# Patient Record
Sex: MALE | Race: WHITE | NOT HISPANIC OR LATINO | Employment: FULL TIME | URBAN - NONMETROPOLITAN AREA
[De-identification: names, ages, dates, MRNs, and addresses within clinical notes are randomized per-mention and may not be internally consistent; named-entity substitution may affect disease eponyms.]

---

## 2017-03-29 ENCOUNTER — RADIANT APPOINTMENT (OUTPATIENT)
Dept: GENERAL RADIOLOGY | Facility: OTHER | Age: 57
End: 2017-03-29
Attending: INTERNAL MEDICINE
Payer: COMMERCIAL

## 2017-03-29 ENCOUNTER — OFFICE VISIT (OUTPATIENT)
Dept: FAMILY MEDICINE | Facility: OTHER | Age: 57
End: 2017-03-29
Payer: COMMERCIAL

## 2017-03-29 VITALS
HEIGHT: 66 IN | WEIGHT: 150.9 LBS | BODY MASS INDEX: 24.25 KG/M2 | TEMPERATURE: 98.6 F | HEART RATE: 122 BPM | OXYGEN SATURATION: 98 % | DIASTOLIC BLOOD PRESSURE: 82 MMHG | SYSTOLIC BLOOD PRESSURE: 138 MMHG

## 2017-03-29 DIAGNOSIS — Z71.89 ADVANCED DIRECTIVES, COUNSELING/DISCUSSION: ICD-10-CM

## 2017-03-29 DIAGNOSIS — Z72.0 TOBACCO ABUSE: ICD-10-CM

## 2017-03-29 DIAGNOSIS — Z00.00 ROUTINE GENERAL MEDICAL EXAMINATION AT A HEALTH CARE FACILITY: Primary | ICD-10-CM

## 2017-03-29 DIAGNOSIS — R63.4 LOSS OF WEIGHT: ICD-10-CM

## 2017-03-29 DIAGNOSIS — Z12.11 SCREEN FOR COLON CANCER: ICD-10-CM

## 2017-03-29 DIAGNOSIS — E78.5 HYPERLIPIDEMIA LDL GOAL <130: ICD-10-CM

## 2017-03-29 DIAGNOSIS — Z12.5 SCREENING FOR PROSTATE CANCER: ICD-10-CM

## 2017-03-29 LAB
ALBUMIN SERPL-MCNC: 4.3 G/DL (ref 3.4–5)
ALBUMIN UR-MCNC: NEGATIVE MG/DL
ALP SERPL-CCNC: 53 U/L (ref 40–150)
ALT SERPL W P-5'-P-CCNC: 29 U/L (ref 0–70)
ANION GAP SERPL CALCULATED.3IONS-SCNC: 4 MMOL/L (ref 3–14)
APPEARANCE UR: CLEAR
AST SERPL W P-5'-P-CCNC: 18 U/L (ref 0–45)
BILIRUB SERPL-MCNC: 0.4 MG/DL (ref 0.2–1.3)
BILIRUB UR QL STRIP: NEGATIVE
BUN SERPL-MCNC: 26 MG/DL (ref 7–30)
CALCIUM SERPL-MCNC: 9.4 MG/DL (ref 8.5–10.1)
CHLORIDE SERPL-SCNC: 106 MMOL/L (ref 94–109)
CHOLEST SERPL-MCNC: 219 MG/DL
CO2 SERPL-SCNC: 32 MMOL/L (ref 20–32)
COLOR UR AUTO: YELLOW
CREAT SERPL-MCNC: 0.93 MG/DL (ref 0.66–1.25)
ERYTHROCYTE [DISTWIDTH] IN BLOOD BY AUTOMATED COUNT: 13 % (ref 10–15)
GFR SERPL CREATININE-BSD FRML MDRD: 84 ML/MIN/1.7M2
GLUCOSE SERPL-MCNC: 103 MG/DL (ref 70–99)
GLUCOSE UR STRIP-MCNC: NEGATIVE MG/DL
HCT VFR BLD AUTO: 42 % (ref 40–53)
HDLC SERPL-MCNC: 81 MG/DL
HGB BLD-MCNC: 14.5 G/DL (ref 13.3–17.7)
HGB UR QL STRIP: NEGATIVE
KETONES UR STRIP-MCNC: NEGATIVE MG/DL
LDLC SERPL CALC-MCNC: 116 MG/DL
LEUKOCYTE ESTERASE UR QL STRIP: NEGATIVE
MCH RBC QN AUTO: 32.2 PG (ref 26.5–33)
MCHC RBC AUTO-ENTMCNC: 34.5 G/DL (ref 31.5–36.5)
MCV RBC AUTO: 93 FL (ref 78–100)
NITRATE UR QL: NEGATIVE
NONHDLC SERPL-MCNC: 138 MG/DL
PH UR STRIP: 6 PH (ref 5–7)
PLATELET # BLD AUTO: 245 10E9/L (ref 150–450)
POTASSIUM SERPL-SCNC: 3.9 MMOL/L (ref 3.4–5.3)
PROT SERPL-MCNC: 7.2 G/DL (ref 6.8–8.8)
PSA SERPL-ACNC: 0.72 UG/L (ref 0–4)
RBC # BLD AUTO: 4.51 10E12/L (ref 4.4–5.9)
SODIUM SERPL-SCNC: 142 MMOL/L (ref 133–144)
SP GR UR STRIP: 1.02 (ref 1–1.03)
T4 FREE SERPL-MCNC: 0.74 NG/DL (ref 0.76–1.46)
TRIGL SERPL-MCNC: 110 MG/DL
TSH SERPL DL<=0.005 MIU/L-ACNC: 5.77 MU/L (ref 0.4–4)
URN SPEC COLLECT METH UR: NORMAL
UROBILINOGEN UR STRIP-ACNC: 0.2 EU/DL (ref 0.2–1)
WBC # BLD AUTO: 7.8 10E9/L (ref 4–11)

## 2017-03-29 PROCEDURE — 80061 LIPID PANEL: CPT | Performed by: INTERNAL MEDICINE

## 2017-03-29 PROCEDURE — G0103 PSA SCREENING: HCPCS | Performed by: INTERNAL MEDICINE

## 2017-03-29 PROCEDURE — 71020 XR CHEST 2 VW: CPT

## 2017-03-29 PROCEDURE — 84439 ASSAY OF FREE THYROXINE: CPT | Performed by: INTERNAL MEDICINE

## 2017-03-29 PROCEDURE — 99386 PREV VISIT NEW AGE 40-64: CPT | Performed by: INTERNAL MEDICINE

## 2017-03-29 PROCEDURE — 84443 ASSAY THYROID STIM HORMONE: CPT | Performed by: INTERNAL MEDICINE

## 2017-03-29 PROCEDURE — 36415 COLL VENOUS BLD VENIPUNCTURE: CPT | Performed by: INTERNAL MEDICINE

## 2017-03-29 PROCEDURE — 85027 COMPLETE CBC AUTOMATED: CPT | Performed by: INTERNAL MEDICINE

## 2017-03-29 PROCEDURE — 80053 COMPREHEN METABOLIC PANEL: CPT | Performed by: INTERNAL MEDICINE

## 2017-03-29 PROCEDURE — 81003 URINALYSIS AUTO W/O SCOPE: CPT | Performed by: INTERNAL MEDICINE

## 2017-03-29 ASSESSMENT — PAIN SCALES - GENERAL: PAINLEVEL: MODERATE PAIN (4)

## 2017-03-29 NOTE — MR AVS SNAPSHOT
After Visit Summary   3/29/2017    Melo Brewer    MRN: [id number]           Patient Information     Date Of Birth          1960        Visit Information        Provider Department      3/29/2017 2:40 PM Toi Wang DO [facility]        Today's Diagnoses     Routine general medical examination at a health care facility    -  1    Loss of weight        Hyperlipidemia LDL goal <130        Screen for colon cancer        Screening for prostate cancer        Tobacco abuse        Advanced directives, counseling/discussion          Care Instructions      Preventive Health Recommendations  Male Ages 50 - 64    Yearly exam:             See your health care provider every year in order to  o   Review health changes.   o   Discuss preventive care.    o   Review your medicines if your doctor has prescribed any.     Have a cholesterol test every 5 years, or more frequently if you are at risk for high cholesterol/heart disease.     Have a diabetes test (fasting glucose) every three years. If you are at risk for diabetes, you should have this test more often.     Have a colonoscopy at age 50, or have a yearly FIT test (stool test). These exams will check for colon cancer.      Talk with your health care provider about whether or not a prostate cancer screening test (PSA) is right for you.    You should be tested each year for STDs (sexually transmitted diseases), if you re at risk.     Shots: Get a flu shot each year. Get a tetanus shot every 10 years.     Nutrition:    Eat at least 5 servings of fruits and vegetables daily.     Eat whole-grain bread, whole-wheat pasta and brown rice instead of white grains and rice.     Talk to your provider about Calcium and Vitamin D.     Lifestyle    Exercise for at least 150 minutes a week (30 minutes a day, 5 days a week). This will help you control your weight and prevent disease.     Limit alcohol to one drink per day.     No smoking.      Wear sunscreen to prevent skin cancer.     See your dentist every six months for an exam and cleaning.     See your eye doctor every 1 to 2 years.          Follow-ups after your visit        Additional Services     GASTROENTEROLOGY ADULT REF PROCEDURE ONLY       Last Lab Result: Creatinine (mg/dL)       Date                     Value                 04/21/2009               0.80             ----------  Body mass index is 24.36 kg/(m^2).     Needed:  No  Language:  English    Patient will be contacted to schedule procedure.     Please be aware that coverage of these services is subject to the terms and limitations of your health insurance plan.  Call member services at your health plan with any benefit or coverage questions.  Any procedures must be performed at a [facility] facility OR coordinated by your clinic's referral office.    Please bring the following with you to your appointment:    (1) Any X-Rays, CTs or MRIs which have been performed.  Contact the facility where they were done to arrange for  prior to your scheduled appointment.    (2) List of current medications   (3) This referral request   (4) Any documents/labs given to you for this referral                  Who to contact     If you have questions or need follow up information about today's clinic visit or your schedule please contact [facility] directly at [phone].  Normal or non-critical lab and imaging results will be communicated to you by MyChart, letter or phone within 4 business days after the clinic has received the results. If you do not hear from us within 7 days, please contact the clinic through MyChart or phone. If you have a critical or abnormal lab result, we will notify you by phone as soon as possible.  Submit refill requests through [facility] or call your pharmacy and they will forward the refill request to us. Please allow 3 business days for your refill to be completed.          Additional  "Information About Your Visit        MyChart Information     [facility] lets you send messages to your doctor, view your test results, renew your prescriptions, schedule appointments and more. To sign up, go to [email or web address] . Click on \"Log in\" on the left side of the screen, which will take you to the Welcome page. Then click on \"Sign up Now\" on the right side of the page.     You will be asked to enter the access code listed below, as well as some personal information. Please follow the directions to create your username and password.     Your access code is: [email or web address]  Expires: 2017  3:43 PM     Your access code will  in 90 days. If you need help or a new code, please call your [place] clinic or [phone].        Care EveryWhere ID     This is your Care EveryWhere ID. This could be used by other organizations to access your [place] medical records  [id number]        Your Vitals Were     Pulse Temperature Height Pulse Oximetry BMI (Body Mass Index)       122 98.6  F (37  C) (Temporal) 5' 6\" (1.676 m) 98% 24.36 kg/m2        Blood Pressure from Last 3 Encounters:   17 138/82   14 136/78   14 138/80    Weight from Last 3 Encounters:   17 150 lb 14.4 oz (68.4 kg)   14 151 lb 9.6 oz (68.8 kg)   14 155 lb 12.8 oz (70.7 kg)              We Performed the Following     CBC with platelets     Comprehensive metabolic panel     GASTROENTEROLOGY ADULT REF PROCEDURE ONLY     LIPID REFLEX TO DIRECT LDL PANEL     PSA, screen     TSH with free T4 reflex     UA reflex to Microscopic        Primary Care Provider    None Specified       No primary provider on file.        Thank you!     Thank you for choosing [facility]  for your care. Our goal is always to provide you with excellent care. Hearing back from our patients is one way we can continue to improve our services. Please take a few minutes to complete the written survey that you may receive in the mail " after your visit with us. Thank you!             Your Updated Medication List - Protect others around you: Learn how to safely use, store and throw away your medicines at www.disposemymeds.org.      Notice  As of 3/29/2017  3:43 PM    You have not been prescribed any medications.

## 2017-03-29 NOTE — LETTER
[facility]  [address] [facility] [id number] [phone]             April 4, 2017    Melo Brewer  [address] [facility] [address]              Dear Melo,    The urine sample is normal.   Thyroid is very minimally decreased.   Cholesterol is well-controlled at 116.   The chemistry panel was normal.  Kidney and liver tests are normal   The blood cell count is normal.  There is no anemia.   I would simply repeat the thyroid tests in about four or five months.    Enclosed is a copy of the results.  It was a pleasure to see you at your last appointment.    If you have any questions or concerns, please call myself or my nurse at [phone].      Sincerely,      Toi Wang DO / care team

## 2017-03-29 NOTE — PROGRESS NOTES
SUBJECTIVE:     CC: Melo Brewer is an 56 year old male who presents for preventative health visit.     The patient does have a few concerns. He's recently had some weight loss which is required him changing to holes on his belt. He notes that his diet hasn't changed but he does not eat at regular times due to his off hours work shift. He has otherwise been feeling well.  He does have a history of tobacco use and has never had a chest x-ray. He notes he occasionally has a cough but has noted no hemoptysis.  He does have a family history of elevated cholesterol and has not had it checked in some time. Is concerned regarding his possible cardiac risk.  He has a small black spot on his lower left anterior tibial area which has been present for a few months. Is concerned regarding the possibility of melanoma. It is slightly irregular in shape and somewhat subcutaneous. No other lesions of concern are noted  He has some paresthesias in his thumb and index finger bilaterally. He does have a history of what appears to be a C6 cervical surgery. Is uncertain as to the details, this was 10 years ago. No significant thenar atrophy is noted.        Healthy Habits:    Do you get at least three servings of calcium containing foods daily (dairy, green leafy vegetables, etc.)? no, taking calcium and/or vitamin D supplement: no    Amount of exercise or daily activities, outside of work: none    Problems taking medications regularly No    Medication side effects: No    Have you had an eye exam in the past two years? yes    Do you see a dentist twice per year? yes  Do you have sleep apnea, excessive snoring or daytime drowsiness?no    Today's PHQ-2 Score:   PHQ-2 ( 1999 Pfizer) 3/29/2017 1/27/2014   Q1: Little interest or pleasure in doing things 0 0   Q2: Feeling down, depressed or hopeless 0 0   PHQ-2 Score 0 0       Abuse: Current or Past(Physical, Sexual or Emotional)- No  Do you feel safe in your environment - Yes    Social  History   Substance Use Topics     Smoking status: Current Every Day Smoker     Packs/day: 0.50     Years: 15.00     Types: Cigarettes     Smokeless tobacco: Never Used     Alcohol use Yes      Comment: 1-2 daily     The patient does not drink >3 drinks per day nor >7 drinks per week.    Last PSA: No results found for: PSA    No results for input(s): CHOL, HDL, LDL, TRIG, CHOLHDLRATIO, NHDL in the last 36947 hours.    Reviewed orders with patient. Reviewed health maintenance and updated orders accordingly - Yes    Reviewed and updated as needed this visit by clinical staff  Tobacco  Allergies  Med Hx  Surg Hx  Fam Hx  Soc Hx        Reviewed and updated as needed this visit by Provider            ROS:  C: NEGATIVE for fever, chills, change in weight  INTEGUMENTARY/SKIN: As noted above.  E: NEGATIVE for vision changes or irritation  ENT: NEGATIVE for ear, mouth and throat problems  R: NEGATIVE for significant  SOB. He does have an occasional cough.  CV: NEGATIVE for chest pain, palpitations or peripheral edema  GI: NEGATIVE for nausea, abdominal pain, heartburn, or change in bowel habits   male: negative for dysuria, hematuria, decreased urinary stream, erectile dysfunction, urethral discharge  M: NEGATIVE for significant arthralgias or myalgia  N: NEGATIVE for weakness, dizziness or paresthesias  E: NEGATIVE for temperature intolerance, skin/hair changes  P: NEGATIVE for changes in mood or affect    Problem list, Medication list, Allergies, and Medical/Social/Surgical histories reviewed in [facility] and updated as appropriate.  Labs reviewed in EPIC  BP Readings from Last 3 Encounters:   03/29/17 162/82   03/06/14 136/78   01/27/14 138/80    Wt Readings from Last 3 Encounters:   03/29/17 150 lb 14.4 oz (68.4 kg)   03/06/14 151 lb 9.6 oz (68.8 kg)   01/27/14 155 lb 12.8 oz (70.7 kg)                  OBJECTIVE:     /82 (BP Location: Right arm, Patient Position: Chair, Cuff Size: Adult Regular)  Pulse 122   "Temp 98.6  F (37  C) (Temporal)  Ht 5' 6\" (1.676 m)  Wt 150 lb 14.4 oz (68.4 kg)  SpO2 98%  BMI 24.36 kg/m2  EXAM:  GENERAL: healthy, alert and no distress  EYES: Eyes grossly normal to inspection, PERRL and conjunctivae and sclerae normal  HENT: ear canals and TM's normal, nose and mouth without ulcers or lesions  NECK: no adenopathy, no asymmetry, masses, or scars and thyroid normal to palpation  RESP: lungs clear to auscultation - no rales, rhonchi or wheezes  CV: regular rate and rhythm, normal S1 S2, no S3 or S4, no murmur, click or rub, no peripheral edema and peripheral pulses strong  ABDOMEN: soft, nontender, no hepatosplenomegaly, no masses and bowel sounds normal  MS: no gross musculoskeletal defects noted, no edema  SKIN: no suspicious rashes are noted. A few scattered nevi are noted. He does have a lesion on his lower left leg as described above. It measures approximately 5 mm in diameter, is mildly subcutaneous, and is irregular in shape and border.  NEURO: Normal strength and tone, mentation intact and speech normal  PSYCH: mentation appears normal, affect normal/bright    ASSESSMENT/PLAN:         ICD-10-CM    1. Routine general medical examination at a health care facility Z00.00 UA reflex to Microscopic     Comprehensive metabolic panel   2. Loss of weight R63.4 CBC with platelets     TSH with free T4 reflex   3. Hyperlipidemia LDL goal <130 E78.5 LIPID REFLEX TO DIRECT LDL PANEL   4. Screen for colon cancer Z12.11 GASTROENTEROLOGY ADULT REF PROCEDURE ONLY   5. Screening for prostate cancer Z12.5 PSA, screen   6. Tobacco abuse Z72.0 XR Chest 2 Views   7. Advanced directives, counseling/discussion Z71.89      Will check chest x-ray to rule out possible pulmonary lesion as he does have occasional cough.  We'll set up for colonoscopy  Lab work is ordered  Patient will follow up for excisional biopsy of his lesion on the left leg  Will discuss lab work at that time            COUNSELING:  Reviewed " "preventive health counseling, as reflected in patient instructions       Regular exercise       Healthy diet/nutrition       Vision screening       Hearing screening       Colon cancer screening       Prostate cancer screening    BP Screening:   Last 3 BP Readings:    BP Readings from Last 3 Encounters:   03/29/17 138/82   03/06/14 136/78   01/27/14 138/80       The following was recommended to the patient:  Re-screen BP within a year and recommended lifestyle modifications     reports that he has been smoking Cigarettes.  He has a 7.50 pack-year smoking history. He has never used smokeless tobacco.  Tobacco Cessation Action Plan: Information offered: Patient not interested at this time  Estimated body mass index is 24.36 kg/(m^2) as calculated from the following:    Height as of this encounter: 5' 6\" (1.676 m).    Weight as of this encounter: 150 lb 14.4 oz (68.4 kg).       Counseling Resources:  ATP IV Guidelines  Pooled Cohorts Equation Calculator  FRAX Risk Assessment  ICSI Preventive Guidelines  Dietary Guidelines for Americans, 2010  USDA's MyPlate  ASA Prophylaxis  Lung CA Screening    Toi Wang,   [facility]"

## 2017-03-29 NOTE — NURSING NOTE
"Chief Complaint   Patient presents with     Physical       Initial /82 (BP Location: Right arm, Patient Position: Chair, Cuff Size: Adult Regular)  Pulse 122  Temp 98.6  F (37  C) (Temporal)  Ht 5' 6\" (1.676 m)  Wt 150 lb 14.4 oz (68.4 kg)  SpO2 98%  BMI 24.36 kg/m2 Estimated body mass index is 24.36 kg/(m^2) as calculated from the following:    Height as of this encounter: 5' 6\" (1.676 m).    Weight as of this encounter: 150 lb 14.4 oz (68.4 kg).  Medication Reconciliation: complete   Laxmi THACKER      "

## 2017-03-29 NOTE — LETTER

## 2017-03-30 ENCOUNTER — TELEPHONE (OUTPATIENT)
Dept: INTERNAL MEDICINE | Facility: CLINIC | Age: 57
End: 2017-03-30

## 2017-03-30 NOTE — TELEPHONE ENCOUNTER
Left message for patient to return call to schedule colonoscopy or EGD. If Dulce or Sherin are unavailable, please transfer to the surgery center.     OK to schedule with ED or [facility]

## 2017-04-04 ENCOUNTER — TELEPHONE (OUTPATIENT)
Dept: INTERNAL MEDICINE | Facility: CLINIC | Age: 57
End: 2017-04-04

## 2017-04-04 NOTE — PROGRESS NOTES
Please contact the patient and notify him of the following:  The chest x-ray was normal.              Thank you.  DO LORENZO Aguilar

## 2017-04-04 NOTE — TELEPHONE ENCOUNTER
Melo returns call and message below is relayed.  Pt states understanding and had no other questions or concerns.

## 2017-04-04 NOTE — TELEPHONE ENCOUNTER
----- Message from Toi Wang DO sent at 4/3/2017 10:25 PM CDT -----    Please contact the patient and notify him of the following:  The chest x-ray was normal.              Thank you.  Toi Wang DO [facility]

## 2017-04-04 NOTE — PROGRESS NOTES
Please contact the patient and notify him of the following:  The urine sample is normal.  Thyroid is very minimally decreased.  Cholesterol is well-controlled at 116.  The chemistry panel was normal.  Kidney and liver tests are normal  The blood cell count is normal.  There is no anemia.  I would simply repeat the thyroid tests in about four or five months.          Thank you.  DO LORENZO Aguilar

## 2017-04-12 ENCOUNTER — OFFICE VISIT (OUTPATIENT)
Dept: FAMILY MEDICINE | Facility: OTHER | Age: 57
End: 2017-04-12
Payer: COMMERCIAL

## 2017-04-12 VITALS
HEART RATE: 78 BPM | WEIGHT: 148 LBS | TEMPERATURE: 98.5 F | SYSTOLIC BLOOD PRESSURE: 142 MMHG | BODY MASS INDEX: 23.89 KG/M2 | OXYGEN SATURATION: 99 % | DIASTOLIC BLOOD PRESSURE: 78 MMHG

## 2017-04-12 DIAGNOSIS — T14.8XXA WOUND INFECTION: ICD-10-CM

## 2017-04-12 DIAGNOSIS — L98.9 SKIN LESION OF LEFT LEG: Primary | ICD-10-CM

## 2017-04-12 DIAGNOSIS — L08.9 WOUND INFECTION: ICD-10-CM

## 2017-04-12 PROCEDURE — 11403 EXC TR-EXT B9+MARG 2.1-3CM: CPT | Performed by: INTERNAL MEDICINE

## 2017-04-12 PROCEDURE — 88342 IMHCHEM/IMCYTCHM 1ST ANTB: CPT | Performed by: INTERNAL MEDICINE

## 2017-04-12 PROCEDURE — 88341 IMHCHEM/IMCYTCHM EA ADD ANTB: CPT | Performed by: INTERNAL MEDICINE

## 2017-04-12 PROCEDURE — 88305 TISSUE EXAM BY PATHOLOGIST: CPT | Performed by: INTERNAL MEDICINE

## 2017-04-12 PROCEDURE — 88313 SPECIAL STAINS GROUP 2: CPT | Performed by: INTERNAL MEDICINE

## 2017-04-12 RX ORDER — AMOXICILLIN 250 MG/1
250 CAPSULE ORAL 3 TIMES DAILY
Qty: 15 CAPSULE | Refills: 0 | Status: SHIPPED | OUTPATIENT
Start: 2017-04-12 | End: 2017-05-15

## 2017-04-12 ASSESSMENT — PAIN SCALES - GENERAL: PAINLEVEL: NO PAIN (0)

## 2017-04-12 NOTE — NURSING NOTE
"Chief Complaint   Patient presents with     Light/dark Spots     on right lower leg       Initial /78 (BP Location: Right arm, Patient Position: Chair, Cuff Size: Adult Regular)  Pulse 78  Temp 98.5  F (36.9  C) (Temporal)  Wt 148 lb (67.1 kg)  SpO2 99%  BMI 23.89 kg/m2 Estimated body mass index is 23.89 kg/(m^2) as calculated from the following:    Height as of 3/29/17: 5' 6\" (1.676 m).    Weight as of this encounter: 148 lb (67.1 kg).  Medication Reconciliation: complete   Health Maintenance reviewed at today's visit patient asked to schedule/complete:   Colon Cancer:  Patient agrees to schedule   Laxmi THACKER        "

## 2017-04-12 NOTE — PROGRESS NOTES
Chief Complaint   Patient presents with     Light/dark Spots     on right lower leg       Procedure Note  Procedure: Excision of atypical skin lesion on the lower anterior left tibial area  Indication: Atypical skin lesion which is increasing in size over the last several months  Anesthesia: 1 cc of 1% lidocaine with epinephrine  Postprocedural diagnosis: Same  Procedure details:   The area sterilely prepped and draped using Betadine and sterile technique. Once adequate anesthesia was obtained, a elliptical excision of approximately 2 cm in length was made vertically including the lesion. This was not removed and the underlying black material which may have been old hematoma was removed as well and passed off to pathology. Hemostasis was excellent. The local area was then undermined using 3 simple sutures, it was closed with good approximation. The area was then cleaned and appropriately dressed. Patient had no discomfort and blood loss was 0.    Follow-up: He will be placed on amoxicillin 250 mg 3 times daily for 5 days for the prevention of secondary infection given the location on his lower leg.  Follow-up will also include review of the pathology results when they're available.    Patient will be contacted when results are available.  He will return to have the sutures removed in about 1 week  He is informed to be on the alert for possible signs of infection and they are explained        Kathleen

## 2017-04-12 NOTE — LETTER
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 The operative work here is \"benign\".  If you have any further questions or problems, please contact our office.          Sincerely,      Toi Wang, DO        "

## 2017-04-12 NOTE — MR AVS SNAPSHOT
"              After Visit Summary   2017    Melo Brewer    MRN: [id number]           Patient Information     Date Of Birth          1960        Visit Information        Provider Department      2017 2:00 PM Toi Wang DO [facility]        Today's Diagnoses     Skin lesion of left leg    -  1    Wound infection           Follow-ups after your visit        Who to contact     If you have questions or need follow up information about today's clinic visit or your schedule please contact [facility] directly at [phone].  Normal or non-critical lab and imaging results will be communicated to you by [employer], letter or phone within 4 business days after the clinic has received the results. If you do not hear from us within 7 days, please contact the clinic through [employer] or phone. If you have a critical or abnormal lab result, we will notify you by phone as soon as possible.  Submit refill requests through [facility] or call your pharmacy and they will forward the refill request to us. Please allow 3 business days for your refill to be completed.          Additional Information About Your Visit        MyChart Information     [facility] lets you send messages to your doctor, view your test results, renew your prescriptions, schedule appointments and more. To sign up, go to [email or web address] . Click on \"Log in\" on the left side of the screen, which will take you to the Welcome page. Then click on \"Sign up Now\" on the right side of the page.     You will be asked to enter the access code listed below, as well as some personal information. Please follow the directions to create your username and password.     Your access code is: [email or web address]  Expires: 2017  3:43 PM     Your access code will  in 90 days. If you need help or a new code, please call your [facility] or [phone].        Care EveryWhere ID     This is your Care EveryWhere ID. This " could be used by other organizations to access your [place] medical records  [id number]        Your Vitals Were     Pulse Temperature Pulse Oximetry BMI (Body Mass Index)          78 98.5  F (36.9  C) (Temporal) 99% 23.89 kg/m2         Blood Pressure from Last 3 Encounters:   04/12/17 142/78   03/29/17 138/82   03/06/14 136/78    Weight from Last 3 Encounters:   04/12/17 148 lb (67.1 kg)   03/29/17 150 lb 14.4 oz (68.4 kg)   03/06/14 151 lb 9.6 oz (68.8 kg)              We Performed the Following     EXC BENIGN SKIN LESION TRUNK/ARM/LEG 2.1-3.0 CM     Surgical pathology exam          Today's Medication Changes          These changes are accurate as of: 4/12/17 11:59 PM.  If you have any questions, ask your nurse or doctor.               Start taking these medicines.        Dose/Directions    amoxicillin 250 MG capsule   Commonly known as:  AMOXIL   Used for:  Wound infection   Started by:  Toi Wang DO        Dose:  250 mg   Take 1 capsule (250 mg) by mouth 3 times daily   Quantity:  15 capsule   Refills:  0            Where to get your medicines      These medications were sent to [id number] - [place], [place] - [address] [place] [address]  [address] [place] [id number]    Hours:  M-F 8:30-6:30; Sat 9-4; closed Sunday Phone:  [phone]     amoxicillin 250 MG capsule                Primary Care Provider    None Specified       [address] [place] [address]        Thank you!     Thank you for choosing [facility]  for your care. Our goal is always to provide you with excellent care. Hearing back from our patients is one way we can continue to improve our services. Please take a few minutes to complete the written survey that you may receive in the mail after your visit with us. Thank you!             Your Updated Medication List - Protect others around you: Learn how to safely use, store and throw away your medicines at www.disposemymeds.org.          This  list is accurate as of: 4/12/17 11:59 PM.  Always use your most recent med list.                   Brand Name Dispense Instructions for use    amoxicillin 250 MG capsule    AMOXIL    15 capsule    Take 1 capsule (250 mg) by mouth 3 times daily

## 2017-04-14 LAB — COPATH REPORT: NORMAL

## 2017-04-17 ENCOUNTER — TELEPHONE (OUTPATIENT)
Dept: INTERNAL MEDICINE | Facility: OTHER | Age: 57
End: 2017-04-17

## 2017-04-17 DIAGNOSIS — Z12.11 SPECIAL SCREENING FOR MALIGNANT NEOPLASMS, COLON: Primary | ICD-10-CM

## 2017-04-17 NOTE — LETTER

## 2017-04-18 NOTE — PROGRESS NOTES
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 The operative work here is \"benign\".    Thank you.  DO LORENZO Aguilar  "

## 2017-04-19 ENCOUNTER — TELEPHONE (OUTPATIENT)
Dept: INTERNAL MEDICINE | Facility: OTHER | Age: 57
End: 2017-04-19

## 2017-04-19 NOTE — TELEPHONE ENCOUNTER
Called to schedule colonoscopy, no answer, no voicemail option    Ok to schedule with [facility] or [facility]

## 2017-04-20 ENCOUNTER — TELEPHONE (OUTPATIENT)
Dept: FAMILY MEDICINE | Facility: CLINIC | Age: 57
End: 2017-04-20

## 2017-04-20 NOTE — TELEPHONE ENCOUNTER
Reason for Call:  Request for results:    Name of test or procedure: lab      Date of test of procedure: 4/12     Location of the test or procedure: [facility]    OK to leave the result message on voice mail or with a family member? YES    Phone number Patient can be reached at:  Other phone number:  [facility] [phone]    Additional comments: Requesting results     Call taken on 4/20/2017 at 8:11 AM by Ivy Washington

## 2017-04-20 NOTE — TELEPHONE ENCOUNTER
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 The operative work here is \"benign\".    Thank you.  Toi Wang DO [facility]  "

## 2017-05-19 ENCOUNTER — SURGERY (OUTPATIENT)
Age: 57
End: 2017-05-19

## 2017-05-19 ENCOUNTER — HOSPITAL ENCOUNTER (OUTPATIENT)
Facility: CLINIC | Age: 57
Discharge: HOME | End: 2017-05-19
Attending: INTERNAL MEDICINE | Admitting: INTERNAL MEDICINE
Payer: COMMERCIAL

## 2017-05-19 VITALS
RESPIRATION RATE: 16 BRPM | OXYGEN SATURATION: 96 % | SYSTOLIC BLOOD PRESSURE: 110 MMHG | DIASTOLIC BLOOD PRESSURE: 80 MMHG | TEMPERATURE: 98.2 F

## 2017-05-19 LAB — COLONOSCOPY: NORMAL

## 2017-05-19 PROCEDURE — 40000296 ZZH STATISTIC ENDO RECOVERY CLASS 1:2 FIRST HOUR: Performed by: INTERNAL MEDICINE

## 2017-05-19 PROCEDURE — 25000128 H RX IP 250 OP 636: Performed by: INTERNAL MEDICINE

## 2017-05-19 PROCEDURE — 25000125 ZZHC RX 250: Performed by: INTERNAL MEDICINE

## 2017-05-19 PROCEDURE — G0121 COLON CA SCRN NOT HI RSK IND: HCPCS | Performed by: INTERNAL MEDICINE

## 2017-05-19 PROCEDURE — 40000297 ZZH STATISTIC ENDO RECOVERY CLASS 1:2 EACH ADDL HOUR: Performed by: INTERNAL MEDICINE

## 2017-05-19 PROCEDURE — 45378 DIAGNOSTIC COLONOSCOPY: CPT | Performed by: INTERNAL MEDICINE

## 2017-05-19 RX ORDER — ONDANSETRON 4 MG/1
4 TABLET, ORALLY DISINTEGRATING ORAL EVERY 6 HOURS PRN
Status: DISCONTINUED | OUTPATIENT
Start: 2017-05-19 | End: 2017-05-19 | Stop reason: HOSPADM

## 2017-05-19 RX ORDER — LIDOCAINE 40 MG/G
CREAM TOPICAL
Status: DISCONTINUED | OUTPATIENT
Start: 2017-05-19 | End: 2017-05-19 | Stop reason: HOSPADM

## 2017-05-19 RX ORDER — NALOXONE HYDROCHLORIDE 0.4 MG/ML
.1-.4 INJECTION, SOLUTION INTRAMUSCULAR; INTRAVENOUS; SUBCUTANEOUS
Status: DISCONTINUED | OUTPATIENT
Start: 2017-05-19 | End: 2017-05-19 | Stop reason: HOSPADM

## 2017-05-19 RX ORDER — FLUMAZENIL 0.1 MG/ML
0.2 INJECTION, SOLUTION INTRAVENOUS
Status: DISCONTINUED | OUTPATIENT
Start: 2017-05-19 | End: 2017-05-19 | Stop reason: HOSPADM

## 2017-05-19 RX ORDER — FENTANYL CITRATE 50 UG/ML
INJECTION, SOLUTION INTRAMUSCULAR; INTRAVENOUS PRN
Status: DISCONTINUED | OUTPATIENT
Start: 2017-05-19 | End: 2017-05-19 | Stop reason: HOSPADM

## 2017-05-19 RX ORDER — ONDANSETRON 2 MG/ML
4 INJECTION INTRAMUSCULAR; INTRAVENOUS EVERY 6 HOURS PRN
Status: DISCONTINUED | OUTPATIENT
Start: 2017-05-19 | End: 2017-05-19 | Stop reason: HOSPADM

## 2017-05-19 RX ORDER — ONDANSETRON 2 MG/ML
4 INJECTION INTRAMUSCULAR; INTRAVENOUS
Status: DISCONTINUED | OUTPATIENT
Start: 2017-05-19 | End: 2017-05-19 | Stop reason: HOSPADM

## 2017-05-19 RX ADMIN — MIDAZOLAM HYDROCHLORIDE 2 MG: 1 INJECTION, SOLUTION INTRAMUSCULAR; INTRAVENOUS at 12:02

## 2017-05-19 RX ADMIN — MIDAZOLAM HYDROCHLORIDE 1 MG: 1 INJECTION, SOLUTION INTRAMUSCULAR; INTRAVENOUS at 11:57

## 2017-05-19 RX ADMIN — FENTANYL CITRATE 50 MCG: 50 INJECTION, SOLUTION INTRAMUSCULAR; INTRAVENOUS at 12:00

## 2017-05-19 RX ADMIN — MIDAZOLAM HYDROCHLORIDE 1 MG: 1 INJECTION, SOLUTION INTRAMUSCULAR; INTRAVENOUS at 11:56

## 2017-05-19 RX ADMIN — FENTANYL CITRATE 50 MCG: 50 INJECTION, SOLUTION INTRAMUSCULAR; INTRAVENOUS at 11:54

## 2017-05-19 RX ADMIN — MIDAZOLAM HYDROCHLORIDE 2 MG: 1 INJECTION, SOLUTION INTRAMUSCULAR; INTRAVENOUS at 12:06

## 2017-05-19 RX ADMIN — MIDAZOLAM HYDROCHLORIDE 2 MG: 1 INJECTION, SOLUTION INTRAMUSCULAR; INTRAVENOUS at 11:54

## 2017-05-19 RX ADMIN — LIDOCAINE HYDROCHLORIDE 1 ML: 10 INJECTION, SOLUTION EPIDURAL; INFILTRATION; INTRACAUDAL; PERINEURAL at 11:06

## 2017-05-19 RX ADMIN — MIDAZOLAM HYDROCHLORIDE 1 MG: 1 INJECTION, SOLUTION INTRAMUSCULAR; INTRAVENOUS at 12:04

## 2017-05-19 RX ADMIN — MIDAZOLAM HYDROCHLORIDE 1 MG: 1 INJECTION, SOLUTION INTRAMUSCULAR; INTRAVENOUS at 11:55

## 2017-05-19 NOTE — H&P
[facility] GI Pre-Procedure Physical Assessment    Melo Brewer MRN# [id number]   Age: 57 year old YOB: 1960      Date of Surgery: 5/19/2017  Location [facility]      Date of Exam 5/19/2017 Facility (Same day)       Home clinic: [facility]  Primary care provider: Toi Wang         Active problem list:   Patient Active Problem List   Diagnosis     CARDIOVASCULAR SCREENING; LDL GOAL LESS THAN 130     Advanced directives, counseling/discussion            Medications (include herbals and vitamins):   Any Plavix use in the last 7 days?  No     Current Facility-Administered Medications   Medication     lidocaine 1 % 1 mL     lidocaine (LMX4) kit     sodium chloride (PF) 0.9% PF flush 3 mL     sodium chloride (PF) 0.9% PF flush 3 mL     sodium chloride (PF) 0.9% PF flush 3 mL     ondansetron (ZOFRAN) injection 4 mg             Allergies:      Allergies   Allergen Reactions     Apple Anaphylaxis     Bees Anaphylaxis     Allergy to Latex?  No  Allergy to tape?    No          Social History:     Social History   Substance Use Topics     Smoking status: Current Every Day Smoker     Packs/day: 0.50     Years: 15.00     Types: Cigarettes     Smokeless tobacco: Never Used     Alcohol use Yes      Comment: 1-2 daily            Physical Exam:   All vitals have been reviewed  Blood pressure 140/85, temperature 98.2  F (36.8  C), temperature source Oral, resp. rate 16, SpO2 99 %.  Airway assessment:   Patient is able to open mouth wide  Patient is able to stick out tongue      Lungs:   No increased work of breathing, good air exchange, clear to auscultation bilaterally, no crackles or wheezing      Cardiovascular:   Normal apical impulse, regular rate and rhythm, normal S1 and S2, no S3 or S4, and no murmur noted           Lab / Radiology Results:   All laboratory data reviewed          Assessment:   Appropriately NPO  Chief complaint or anatomic assessment of  involved area: Screening         Plan:   Moderate (conscious) sedation     Patient's active problems diagnostically and therapeutically optimized for the planned procedure  Risks, benefits, alternatives to sedation and blood explained and consent obtained  Risks, benefits, alternatives to procedure explained and consent obtained  P1 (normal healthy patient)  Orders and progress notes are in the chart  Discharge from Phase 1 and / or Phase 2 recovery when patient meets criteria    I have reviewed the history and physical, lab finding(s), diagnostic data, medicaitons, and the plan for sedation.  I have determined this patient to be an appropriate candidate for the planned sedation / procedure and have reassessed the patient immediately prior to sedation / procedure.    I have personally and medically directed the administration of medications used.    HUSEYIN WHITE MD

## 2017-05-22 ENCOUNTER — TELEPHONE (OUTPATIENT)
Dept: INTERNAL MEDICINE | Facility: CLINIC | Age: 57
End: 2017-05-22

## 2017-05-22 NOTE — TELEPHONE ENCOUNTER
Left message with wife for the patient to call the clinic.  Tried patient on work phone but there was no answer.  Russell Tavarez MA

## 2017-05-22 NOTE — TELEPHONE ENCOUNTER
----- Message from Toi Wang DO sent at 5/21/2017 11:42 PM CDT -----    Please contact the patient and notify him of the following:  A few scattered diverticula I were noted in the bottom of the colon.  No infection was seen.  Recommend a high-fiber diet and the avoidance of insoluble particulate food (corn).    Thank you.  Toi Wang DO FACOI

## 2017-05-22 NOTE — PROGRESS NOTES
Please contact the patient and notify him of the following:  A few scattered diverticula I were noted in the bottom of the colon.  No infection was seen.  Recommend a high-fiber diet and the avoidance of insoluble particulate food (corn).    Thank you.  DO LORENZO Aguilar

## 2017-05-22 NOTE — LETTER
May 24, 2017      Melo Brewer  [address] [facility] [address]              Dear Melo rBewer,    A few scattered diverticula I were noted in the bottom of the colon.  No infection was seen.  Recommend a high-fiber diet and the avoidance of insoluble particulate food (corn).    Sincerely,    Dr. Wang / care team

## 2017-05-24 NOTE — TELEPHONE ENCOUNTER
Called pt and left a msg on his phone to call back. I also mailed out a letter to pt.   Marva Jimenez MA

## 2018-07-18 ENCOUNTER — ALLIED HEALTH/NURSE VISIT (OUTPATIENT)
Dept: FAMILY MEDICINE | Facility: OTHER | Age: 58
End: 2018-07-18
Payer: COMMERCIAL

## 2018-07-18 DIAGNOSIS — Z23 NEED FOR VACCINATION: Primary | ICD-10-CM

## 2018-07-18 PROCEDURE — 90471 IMMUNIZATION ADMIN: CPT

## 2018-07-18 PROCEDURE — 90715 TDAP VACCINE 7 YRS/> IM: CPT

## 2018-07-18 NOTE — MR AVS SNAPSHOT
After Visit Summary   7/18/2018    Melo Brewer    MRN: [id number]           Patient Information     Date Of Birth          1960        Visit Information        Provider Department      7/18/2018 4:00 PM TOMAS CANELA MA [facility]        Today's Diagnoses     Need for vaccination    -  1       Follow-ups after your visit        Who to contact     If you have questions or need follow up information about today's clinic visit or your schedule please contact [facility] directly at [phone].  Normal or non-critical lab and imaging results will be communicated to you by MyChart, letter or phone within 4 business days after the clinic has received the results. If you do not hear from us within 7 days, please contact the clinic through MyChart or phone. If you have a critical or abnormal lab result, we will notify you by phone as soon as possible.  Submit refill requests through Cli[employer] or call your pharmacy and they will forward the refill request to us. Please allow 3 business days for your refill to be completed.          Additional Information About Your Visit        Care EveryWhere ID     This is your Care EveryWhere ID. This could be used by other organizations to access your [place] medical records  [id number]         Blood Pressure from Last 3 Encounters:   05/19/17 110/80   04/12/17 142/78   03/29/17 138/82    Weight from Last 3 Encounters:   04/12/17 148 lb (67.1 kg)   03/29/17 150 lb 14.4 oz (68.4 kg)   03/06/14 151 lb 9.6 oz (68.8 kg)              We Performed the Following     1st  Administration  [41462]     TDAP VACCINE (ADACEL) [97043.002]        Primary Care Provider Office Phone # Fax #    Toi Gama Wang [phone]       [address] [place] [address]        Equal Access to Services     ARETHA GUY : Jennie Dang, abeba marina, qamargaritata kaalsoraida manzo, ross burgos.  So [facility] [phone].    ATENCIÓN: Si habla susy, tiene a kirk disposición servicios gratuitos de asistencia lingüística. Laura al [phone].    We comply with applicable federal civil rights laws and [place] laws. We do not discriminate on the basis of race, color, national origin, age, disability, sex, sexual orientation, or gender identity.            Thank you!     Thank you for choosing [facility]  for your care. Our goal is always to provide you with excellent care. Hearing back from our patients is one way we can continue to improve our services. Please take a few minutes to complete the written survey that you may receive in the mail after your visit with us. Thank you!             Your Updated Medication List - Protect others around you: Learn how to safely use, store and throw away your medicines at www.disposemymeds.org.      Notice  As of 7/18/2018  4:14 PM    You have not been prescribed any medications.

## 2018-07-18 NOTE — NURSING NOTE
Screening Questionnaire for Adult Immunization    Are you sick today?   No   Do you have allergies to medications, food, a vaccine component or latex?   No   Have you ever had a serious reaction after receiving a vaccination?   No   Do you have a long-term health problem with heart disease, lung disease, asthma, kidney disease, metabolic disease (e.g. diabetes), anemia, or other blood disorder?   No   Do you have cancer, leukemia, HIV/AIDS, or any other immune system problem?   No   In the past 3 months, have you taken medications that affect  your immune system, such as prednisone, other steroids, or anticancer drugs; drugs for the treatment of rheumatoid arthritis, Crohn s disease, or psoriasis; or have you had radiation treatments?   No   Have you had a seizure, or a brain or other nervous system problem?   No   During the past year, have you received a transfusion of blood or blood     products, or been given immune (gamma) globulin or antiviral drug?   No   For women: Are you pregnant or is there a chance you could become        pregnant during the next month?   No   Have you received any vaccinations in the past 4 weeks?   No     Immunization questionnaire answers were all negative.        Per orders of  , injection of  given by Liane Herrera. Patient instructed to remain in clinic for 15 minutes afterwards, and to report any adverse reaction to me immediately.       Screening performed by Liane Herrera on 7/18/2018 at 3:57 PM.      Prior to injection verified patient identity using patient's name and date of birth.  Patient instructed to wait 15-20 minutes after injection and to report any adverse reactions.

## 2018-08-22 ENCOUNTER — HOSPITAL ENCOUNTER (EMERGENCY)
Facility: CLINIC | Age: 58
Discharge: HOME | End: 2018-08-22
Attending: FAMILY MEDICINE | Admitting: FAMILY MEDICINE
Payer: COMMERCIAL

## 2018-08-22 VITALS
WEIGHT: 155 LBS | DIASTOLIC BLOOD PRESSURE: 86 MMHG | RESPIRATION RATE: 20 BRPM | BODY MASS INDEX: 25.02 KG/M2 | TEMPERATURE: 97.9 F | SYSTOLIC BLOOD PRESSURE: 146 MMHG | OXYGEN SATURATION: 99 %

## 2018-08-22 DIAGNOSIS — M62.830 BACK MUSCLE SPASM: ICD-10-CM

## 2018-08-22 DIAGNOSIS — S33.5XXA LUMBAR SPRAIN, INITIAL ENCOUNTER: ICD-10-CM

## 2018-08-22 PROCEDURE — 99284 EMERGENCY DEPT VISIT MOD MDM: CPT | Mod: 25 | Performed by: FAMILY MEDICINE

## 2018-08-22 PROCEDURE — 76942 ECHO GUIDE FOR BIOPSY: CPT | Mod: 26 | Performed by: FAMILY MEDICINE

## 2018-08-22 PROCEDURE — 20552 NJX 1/MLT TRIGGER POINT 1/2: CPT | Performed by: FAMILY MEDICINE

## 2018-08-22 PROCEDURE — 76942 ECHO GUIDE FOR BIOPSY: CPT | Performed by: FAMILY MEDICINE

## 2018-08-22 PROCEDURE — 20552 NJX 1/MLT TRIGGER POINT 1/2: CPT | Mod: Z6 | Performed by: FAMILY MEDICINE

## 2018-08-22 RX ORDER — IBUPROFEN 200 MG
800 TABLET ORAL EVERY 8 HOURS PRN
COMMUNITY
Start: 2018-08-22

## 2018-08-22 RX ORDER — BUPIVACAINE HYDROCHLORIDE 5 MG/ML
1 INJECTION, SOLUTION EPIDURAL; INTRACAUDAL ONCE
Status: DISCONTINUED | OUTPATIENT
Start: 2018-08-22 | End: 2018-08-22 | Stop reason: HOSPADM

## 2018-08-22 RX ORDER — CYCLOBENZAPRINE HCL 10 MG
10 TABLET ORAL 3 TIMES DAILY PRN
Qty: 20 TABLET | Refills: 0 | Status: SHIPPED | OUTPATIENT
Start: 2018-08-22 | End: 2019-01-29

## 2018-08-22 NOTE — ED AVS SNAPSHOT
" [facility] Emergency Department    [address] [place] [address]    Phone:  [phone]    Fax:  [phone]                                       Melo Brewer   MRN: [id number]    Department:  [facility] Emergency Department   Date of Visit:  8/22/2018           Patient Information     Date Of Birth          1960        Your diagnoses for this visit were:     Lumbar sprain, initial encounter     Back muscle spasm        You were seen by Jesus Manuel Hernandez MD.      Follow-up Information     Follow up with Toi Wang DO.    Specialty:  Internal Medicine    Why:  As needed    Contact information:    [address] [place] [address]  [phone]          Discharge Instructions       Ice 20 minutes per hour, (20 minutes on, 40 minutes off) at least 4 times a day.   You can alternate with heat if desired.  Physical therapy/massage/back exercises and core strengthening can also be helpful.  Tylenol/ibuprofen as needed for pain.  You may use the Flexeril as needed for spasm.  This can cause drowsiness or you may want to use it just at bedtime.  Some people find it helpful, others not so much.  Recheck in clinic with Dr. Wang if persistent problems.  Please return to the ED if you lose control of your bladder or bowels, fever over 100.4, weakness in the legs, or any concerns.  Gentle activity as tolerated today.  Avoid heavy lifting, bending or twisting until things settle down.  Please say \"hi\" to Liz for me!  It was nice visiting with both of you this morning.  I am glad you are feeling better and hope you continue to improve.    Thank you for choosing [facility]. We appreciate the opportunity to meet your urgent medical needs. Please let us know if we could have done anything to make your stay more satisfying.    After discharge, please closely monitor for any new or worsening symptoms. Return to the Emergency Department if you " develop any acute worsening signs or symptoms.    If you had lab work, cultures or imaging studies done during your stay, the final results may still be pending. We will call you if your plan of care needs to change. However, if you are not improving as expected, please follow up with your primary care provider or clinic.     Start any prescription medications that were prescribed to you and take them as directed.     Please see additional handouts that may be pertinent to your condition.          Discharge References/Attachments     MUSCLE SPASM (ENGLISH)    BACK SPRAIN/STRAIN (ENGLISH)      24 Hour Appointment Hotline       To make an appointment at any [facility], call [phone] ([phone]). If you don't have a family doctor or clinic, we will help you find one. [facility] clinics are conveniently located to serve the needs of you and your family.             Review of your medicines      START taking        Dose / Directions Last dose taken    cyclobenzaprine 10 MG tablet   Commonly known as:  FLEXERIL   Dose:  10 mg   Quantity:  20 tablet        Take 1 tablet (10 mg) by mouth 3 times daily as needed for muscle spasms   Refills:  0        ibuprofen 200 MG tablet   Commonly known as:  ADVIL/MOTRIN   Dose:  800 mg        Take 4 tablets (800 mg) by mouth every 8 hours as needed for pain (with food)   Refills:  0          Our records show that you are taking the medicines listed below. If these are incorrect, please call your family doctor or clinic.        Dose / Directions Last dose taken    TYLENOL PO   Dose:  1000 mg        Take 1,000 mg by mouth every 6 hours as needed for mild pain or fever   Refills:  0                Prescriptions were sent or printed at these locations (2 Prescriptions)                   [facility] - [address] [address]    Telephone:  [phone]   Fax:  [phone]   Hours:                   E-Prescribed (1 of 2)         cyclobenzaprine (FLEXERIL) 10 MG tablet                 Not Printed or Sent (1 of 2)         ibuprofen (ADVIL/MOTRIN) 200 MG tablet                Orders Needing Specimen Collection     None      Pending Results     No orders found from 8/20/2018 to 8/23/2018.            Pending Culture Results     No orders found from 8/20/2018 to 8/23/2018.            Pending Results Instructions     If you had any lab results that were not finalized at the time of your Discharge, you can call the ED Lab Result RN at [phone]. You will be contacted by this team for any positive Lab results or changes in treatment. The nurses are available 7 days a week from 10A to 6:30P.  You can leave a message 24 hours per day and they will return your call.        Thank you for choosing [facility]       Thank you for choosing [facility] for your care. Our goal is always to provide you with excellent care. Hearing back from our patients is one way we can continue to improve our services. Please take a few minutes to complete the written survey that you may receive in the mail after you visit with us. Thank you!        Care EveryWhere ID     This is your Care EveryWhere ID. This could be used by other organizations to access your [facility] medical records  [id number]        Equal Access to Services     ARETHA GUY AH: Jennie Dang, abeba marina, dina manzo, ross burgos. So [facility] [phone].    ATENCIÓN: Si habla español, tiene a kirk disposición servicios gratuitos de asistencia lingüística. Llame al [phone].    We comply with applicable federal civil rights laws and [place] laws. We do not discriminate on the basis of race, color, national origin, age, disability, sex, sexual orientation, or gender identity.            After Visit Summary       This is your record. Keep this with you and show to your community pharmacist(s) and doctor(s) at your  next visit.

## 2018-08-22 NOTE — ED AVS SNAPSHOT
[facility] Emergency Department    [address] [place] [address]    Phone:  [phone]    Fax:  [phone]                                       Melo Brewer   MRN: [id number]    Department:  [facility] Emergency Department   Date of Visit:  8/22/2018           After Visit Summary Signature Page     I have received my discharge instructions, and my questions have been answered. I have discussed any challenges I see with this plan with the nurse or doctor.    ..........................................................................................................................................  Patient/Patient Representative Signature      ..........................................................................................................................................  Patient Representative Print Name and Relationship to Patient    ..................................................               ................................................  Date                                            Time    ..........................................................................................................................................  Reviewed by Signature/Title    ...................................................              ..............................................  Date                                                            Time

## 2018-08-22 NOTE — LETTER
[facility]  Emergency Department  [address] [place] [address]    [phone] fax      8/22/2018      Melo Brewer  [address] [place] [address] [phone] (home) [phone] (work)        TO WHOM IT MAY CONCERN:      Melo was seen in the Emergency Department on 8/22/2018.    Please excuse from work.    He may return, without restrictions, when improved.      Sincerely,        Jesus Manuel Hernandez MD  Emergency Physician

## 2018-08-22 NOTE — ED TRIAGE NOTES
Reports lower back pain. States he had pain in his lower back last week for a day that went away and has started hurting again yesterday morning. Pt reports some lifting but denies any additional injuries.

## 2018-08-22 NOTE — ED PROVIDER NOTES
History     Chief Complaint   Patient presents with     Back Pain     HPI  Melo Brewer is a 58 year old male who presents to the ED this morning with lower back pain.  He had pain in his low back for 1 day just over a week ago on Monday.  Does not recall any specific injury and things settle down and he did fine the rest of the week.  Over the weekend he was helping to move a very heavy tear ricotta stove.  That was on Saturday.  He noticed some low back discomfort Tuesday morning and ended up missing work.  He has been taking some Tylenol which does seem to help somewhat.  His last dose was at 2 PM yesterday.  He was able to sleep but this morning with more severe pain and spasm.  There is no radiation.  There is no pain down the legs.  No loss of bladder or bowel control.    He has seen a chiropractor in the past.  Is also done physical therapy but did not find that particularly helpful.  Works at [employer] and they do have a physical therapist that comes in once a week to the workplace.  He denies any fevers.  No weight loss or weight gain.  Here with his wife.    Problem List:    Patient Active Problem List    Diagnosis Date Noted     Advanced directives, counseling/discussion 03/29/2017     Priority: Medium     declined       CARDIOVASCULAR SCREENING; LDL GOAL LESS THAN 130 10/31/2010     Priority: Medium        Past Medical History:    History reviewed. No pertinent past medical history.    Past Surgical History:    Past Surgical History:   Procedure Laterality Date     BACK SURGERY       COLONOSCOPY N/A 5/19/2017    Procedure: COLONOSCOPY;  Colonoscopy;  Surgeon: Robert Elmore MD;  Location: [facility]     HC EXCIS PRIMARY GANGLION WRIST  12/23/2004    Left wrist     HC INJ EPIDURAL LUMBAR/SACRAL W/WO CONTRAST  2009     HC REMOVAL OF TONSILS,<13 Y/O  1967    Tonsils <13y.o.     SURGICAL HISTORY OF -   4/24/2009    Left C6-C7 Microdiskectomy.       Family History:    Family History   Problem  Relation Age of Onset     Hypertension Father      on medication HTN     Hyperlipidemia Father      Hypertension Brother        Social History:  Marital Status:   [2]  Social History   Substance Use Topics     Smoking status: Current Every Day Smoker     Packs/day: 0.50     Years: 15.00     Types: Cigarettes     Smokeless tobacco: Never Used     Alcohol use Yes      Comment: 1-2 daily        Medications:      Acetaminophen (TYLENOL PO)   cyclobenzaprine (FLEXERIL) 10 MG tablet   ibuprofen (ADVIL/MOTRIN) 200 MG tablet         Review of Systems   All other systems reviewed and are negative.      Physical Exam   BP: (!) 134/106  Heart Rate: 71  Temp: 97.9  F (36.6  C)  Resp: 20  Weight: 70.3 kg (155 lb)  SpO2: 100 %      Physical Exam   Constitutional: He is oriented to person, place, and time. He appears well-developed and well-nourished. No distress.   HENT:   Head: Normocephalic.   Eyes: EOM are normal.   Neck: Neck supple.   Pulmonary/Chest: Effort normal. No respiratory distress.   Musculoskeletal:        Cervical back: Normal.        Thoracic back: Normal.        Lumbar back: He exhibits decreased range of motion, tenderness (R>L paraspinous), bony tenderness (mild lumbar midline) and spasm (R>L paraspinous).   Neurological: He is alert and oriented to person, place, and time. He has normal strength. No sensory deficit. GCS eye subscore is 4. GCS verbal subscore is 5. GCS motor subscore is 6.   Reflex Scores:       Patellar reflexes are 2+ on the right side and 2+ on the left side.       Achilles reflexes are 2+ on the left side.  Skin: Skin is warm and dry. No rash noted.   Psychiatric: He has a normal mood and affect.       ED Course  (with Medical Decision Making)      58-year-old gentleman with lower back pain without radiation to the legs.  Has had occasional flares in the past and occasionally sees a chiropractor.  Had one day of pain last week without any specific inciting incident.  Did some heavy  lifting over the weekend and now with some muscular pain and spasm in his lower back.    After alcohol prep, I used Marcaine 0.5% plain to inject the paraspinous musculature bilaterally in his lumbar spine.  I did use limited bedside ultrasound just to make sure there was nothing else going on and that my needle was not too long as to hit any critical structures.    He reports excellent pain relief.  He is able to get up and move around.  Pain is 2/10 when it was 10/10 before hand.  He was quite pleased.  He can use Tylenol/ibuprofen for pain.  Ice liberally.  Could alternate with heat if desired.  He can also use some Flexeril particularly at bedtime if needed for spasms that he can sleep.  Physical therapy for stretches, back exercises and core strengthening might be helpful.  Gentle activity today to let things settle down and he can increase his activity as pain allows.  Verbal and written discharge instructions given.       ED Course     Procedures               Critical Care time:  none               No results found for this or any previous visit (from the past 24 hour(s)).    Medications   bupivacaine (MARCAINE) preservative free injection 0.5% (5 mg Intradermal Handoff 8/22/18 0619)       Assessments & Plan      I have reviewed the nursing notes.    I have reviewed the findings, diagnosis, plan and need for follow up with the patient.       Discharge Medication List as of 8/22/2018  6:59 AM      START taking these medications    Details   cyclobenzaprine (FLEXERIL) 10 MG tablet Take 1 tablet (10 mg) by mouth 3 times daily as needed for muscle spasms, Disp-20 tablet, R-0, E-Prescribe      ibuprofen (ADVIL/MOTRIN) 200 MG tablet Take 4 tablets (800 mg) by mouth every 8 hours as needed for pain (with food), OTC             Final diagnoses:   Lumbar sprain, initial encounter   Back muscle spasm       8/22/2018   [facility] EMERGENCY DEPARTMENT     Jesus Manuel Hernandez MD  08/22/18 [id number]

## 2019-01-29 ENCOUNTER — OFFICE VISIT (OUTPATIENT)
Dept: FAMILY MEDICINE | Facility: OTHER | Age: 59
End: 2019-01-29
Payer: COMMERCIAL

## 2019-01-29 VITALS
DIASTOLIC BLOOD PRESSURE: 100 MMHG | BODY MASS INDEX: 24.88 KG/M2 | RESPIRATION RATE: 16 BRPM | HEART RATE: 88 BPM | TEMPERATURE: 98.5 F | HEIGHT: 66 IN | SYSTOLIC BLOOD PRESSURE: 158 MMHG | OXYGEN SATURATION: 100 % | WEIGHT: 154.8 LBS

## 2019-01-29 DIAGNOSIS — G45.3 AF (AMAUROSIS FUGAX): Primary | ICD-10-CM

## 2019-01-29 LAB
ERYTHROCYTE [DISTWIDTH] IN BLOOD BY AUTOMATED COUNT: 13.2 % (ref 10–15)
ERYTHROCYTE [SEDIMENTATION RATE] IN BLOOD BY WESTERGREN METHOD: 7 MM/H (ref 0–20)
HCT VFR BLD AUTO: 44.3 % (ref 40–53)
HGB BLD-MCNC: 14.9 G/DL (ref 13.3–17.7)
MCH RBC QN AUTO: 32.3 PG (ref 26.5–33)
MCHC RBC AUTO-ENTMCNC: 33.6 G/DL (ref 31.5–36.5)
MCV RBC AUTO: 96 FL (ref 78–100)
PLATELET # BLD AUTO: 276 10E9/L (ref 150–450)
RBC # BLD AUTO: 4.62 10E12/L (ref 4.4–5.9)
WBC # BLD AUTO: 8.9 10E9/L (ref 4–11)

## 2019-01-29 PROCEDURE — 85027 COMPLETE CBC AUTOMATED: CPT | Performed by: INTERNAL MEDICINE

## 2019-01-29 PROCEDURE — 85652 RBC SED RATE AUTOMATED: CPT | Performed by: INTERNAL MEDICINE

## 2019-01-29 PROCEDURE — 36415 COLL VENOUS BLD VENIPUNCTURE: CPT | Performed by: INTERNAL MEDICINE

## 2019-01-29 PROCEDURE — 99213 OFFICE O/P EST LOW 20 MIN: CPT | Performed by: INTERNAL MEDICINE

## 2019-01-29 RX ORDER — ASPIRIN 325 MG
325 TABLET, DELAYED RELEASE (ENTERIC COATED) ORAL DAILY
Qty: 90 TABLET | Refills: 3 | Status: SHIPPED | OUTPATIENT
Start: 2019-01-29 | End: 2019-02-06

## 2019-01-29 ASSESSMENT — MIFFLIN-ST. JEOR: SCORE: 1469.92

## 2019-01-29 ASSESSMENT — PAIN SCALES - GENERAL: PAINLEVEL: NO PAIN (0)

## 2019-01-29 NOTE — LETTER
February 11, 2019      Melo Brewer  [address] [facility] [address]        Dear Melo, your recent test results are attached.   The sedimentation rate is normal.  This would suggest a low risk of systemic inflammation.   Blood cell count is normal without evidence of anemia or leukemia.     Feel free to contact me via the office or My Chart if you have any questions regarding the above.     Resulted Orders   ESR: Erythrocyte sedimentation rate   Result Value Ref Range    Sed Rate 7 0 - 20 mm/h   CBC with platelets   Result Value Ref Range    WBC 8.9 4.0 - 11.0 10e9/L    RBC Count 4.62 4.4 - 5.9 10e12/L    Hemoglobin 14.9 13.3 - 17.7 g/dL    Hematocrit 44.3 40.0 - 53.0 %    MCV 96 78 - 100 fl    MCH 32.3 26.5 - 33.0 pg    MCHC 33.6 31.5 - 36.5 g/dL    RDW 13.2 10.0 - 15.0 %    Platelet Count 276 150 - 450 10e9/L       If you have any questions or concerns, please call the clinic at the number listed above.       Sincerely,        Toi Wang, DO

## 2019-01-29 NOTE — PROGRESS NOTES
"  SUBJECTIVE:   Melo Brewer is a 58 year old male who presents to clinic today for the following health issues:  Dizziness  Onset: this morning    Description:   Do you feel faint:  no   Does it feel like the surroundings (bed, room) are moving: no   Unsteady/off balance: no   Have you passed out or fallen: no     Intensity: mild    Progression of Symptoms:  intermittent    Accompanying Signs & Symptoms:  Heart palpitations: no   Nausea, vomiting: no   Weakness in arms or legs: no   Fatigue: no   Vision or speech changes: YES- lost sight in right eye; came back once dizzy spell ended  Ringing in ears (Tinnitus): no   Hearing Loss: no     History:   Head trauma/concussion hx: no   Previous similar symptoms: YES  Recent bleeding history: no     Precipitating factors:   Worse with activity or head movement: no   Any new medications (BP?): no   Alcohol/drug abuse/withdrawal: no     Alleviating factors:   Does staying in a fixed position give relief:  YES    Therapies Tried and outcome: none    HPI:   Patient presents to clinic today due to an episode of vision loss in his right eye this morning. Patient states that for the past several months, he has been having \"bright floaters\" in the right eye, and for the first time this morning, his right eye \"went completely gray\" and he lost vision for ~1-2 min. His vision returned completely following the episode. There was no pain associated with this episode and patient states that it has never happened to him before. Patient denies any history of cardiovascular disease, but mentions that his father has had stents placed in the his carotid artery.     PMHx:  Patient Active Problem List   Diagnosis     CARDIOVASCULAR SCREENING; LDL GOAL LESS THAN 130     Advanced directives, counseling/discussion     Past Surgical History:   Procedure Laterality Date     BACK SURGERY       COLONOSCOPY N/A 5/19/2017    Procedure: COLONOSCOPY;  Colonoscopy;  Surgeon: Robert Elmore MD;  " Location: [facility]     HC EXCIS PRIMARY GANGLION WRIST  12/23/2004    Left wrist     HC INJ EPIDURAL LUMBAR/SACRAL W/WO CONTRAST  2009     HC REMOVAL OF TONSILS,<14 Y/O  1967    Tonsils <13y.o.     SURGICAL HISTORY OF -   4/24/2009    Left C6-C7 Microdiskectomy.       Social History     Tobacco Use     Smoking status: Current Every Day Smoker     Packs/day: 0.50     Years: 15.00     Pack years: 7.50     Types: Cigarettes     Smokeless tobacco: Never Used   Substance Use Topics     Alcohol use: Yes     Comment: 1-2 daily     Family History   Problem Relation Age of Onset     Hypertension Father         on medication HTN     Hyperlipidemia Father      Hypertension Brother          Current Outpatient Medications   Medication Sig Dispense Refill     Acetaminophen (TYLENOL PO) Take 1,000 mg by mouth every 6 hours as needed for mild pain or fever       ibuprofen (ADVIL/MOTRIN) 200 MG tablet Take 4 tablets (800 mg) by mouth every 8 hours as needed for pain (with food)       Allergies   Allergen Reactions     Apple Anaphylaxis     Bees Anaphylaxis     Recent Labs   Lab Test 03/29/17  1526   *   HDL 81   TRIG 110   ALT 29   CR 0.93   GFRESTIMATED 84   GFRESTBLACK >90   GFR Calc     POTASSIUM 3.9   TSH 5.77*      BP Readings from Last 3 Encounters:   01/29/19 (!) 158/100   08/22/18 146/86   05/19/17 110/80    Wt Readings from Last 3 Encounters:   01/29/19 70.2 kg (154 lb 12.8 oz)   08/22/18 70.3 kg (155 lb)   04/12/17 67.1 kg (148 lb)          Reviewed and updated as needed this visit by clinical staff  Tobacco  Allergies  Meds  Med Hx  Surg Hx  Fam Hx  Soc Hx      Reviewed and updated as needed this visit by Provider       ROS:  CONSTITUTIONAL: NEGATIVE for fever, chills, change in weight  EYES: POSITIVE for acute vision loss of the right eye this morning, as noted in HPI  RESP: NEGATIVE for significant cough or SOB  CV: NEGATIVE for chest pain, palpitations or peripheral edema  GI: NEGATIVE for  "nausea, abdominal pain, heartburn, or change in bowel habits  NEURO: NEGATIVE for dizziness, numbness, or paresthesias  ENDOCRINE: NEGATIVE for temperature intolerance, skin/hair changes  PSYCHIATRIC: NEGATIVE for changes in mood or affect    OBJECTIVE:     BP (!) 158/100   Pulse 88   Temp 98.5  F (36.9  C) (Temporal)   Resp 16   Ht 1.676 m (5' 6\")   Wt 70.2 kg (154 lb 12.8 oz)   SpO2 100%   BMI 24.99 kg/m    Body mass index is 24.99 kg/m .     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 Will check ESR to rule out giant cell arteritis. Recommended patient take 325mg aspirin qday as we go through the process of working up his symptoms.  Plan: MRA Brain ([place]) wo Contrast, MRA         Neck (Carotids) wo Contrast, ESR: Erythrocyte         sedimentation rate, CBC with platelets, aspirin        (ASA) 325 MG EC tablet       Follow-up pending results of above tests.           The above patient was interviewed, examined, diagnosed, and a care plan was initiated by myself personally in the presence and with the assistance of:Josefina Manzo PA/S        Toi Wang, [facility]  "

## 2019-02-01 ENCOUNTER — HOSPITAL ENCOUNTER (OUTPATIENT)
Dept: MRI IMAGING | Facility: CLINIC | Age: 59
End: 2019-02-01
Attending: INTERNAL MEDICINE
Payer: COMMERCIAL

## 2019-02-01 ENCOUNTER — HOSPITAL ENCOUNTER (OUTPATIENT)
Dept: MRI IMAGING | Facility: CLINIC | Age: 59
Discharge: HOME | End: 2019-02-01
Attending: INTERNAL MEDICINE | Admitting: INTERNAL MEDICINE
Payer: COMMERCIAL

## 2019-02-01 DIAGNOSIS — G45.3 AF (AMAUROSIS FUGAX): ICD-10-CM

## 2019-02-01 PROCEDURE — 70544 MR ANGIOGRAPHY HEAD W/O DYE: CPT

## 2019-02-01 PROCEDURE — 25000125 ZZHC RX 250: Performed by: INTERNAL MEDICINE

## 2019-02-01 PROCEDURE — A9585 GADOBUTROL INJECTION: HCPCS | Performed by: INTERNAL MEDICINE

## 2019-02-01 PROCEDURE — 70549 MR ANGIOGRAPH NECK W/O&W/DYE: CPT

## 2019-02-01 PROCEDURE — 25500064 ZZH RX 255 OP 636: Performed by: INTERNAL MEDICINE

## 2019-02-01 RX ORDER — GADOBUTROL 604.72 MG/ML
7.5 INJECTION INTRAVENOUS ONCE
Status: COMPLETED | OUTPATIENT
Start: 2019-02-01 | End: 2019-02-01

## 2019-02-01 RX ADMIN — SODIUM CHLORIDE 50 ML: 9 INJECTION, SOLUTION INTRAVENOUS at 16:38

## 2019-02-01 RX ADMIN — GADOBUTROL 7 ML: 604.72 INJECTION INTRAVENOUS at 16:37

## 2019-02-01 NOTE — LETTER
February 11, 2019      Melo Brewer  [address]  [facility] [address]        Dear Melo, your recent test results are attached.   MRA of the brain is normal.     Feel free to contact me via the office or My Chart if you have any questions regarding the above.     Resulted Orders   MRA Brain ([place]) wo Contrast    Narrative    MR ANGIOGRAM OF THE HEAD WITHOUT CONTRAST   2/1/2019 5:12 PM     HISTORY: Amaurosis fugax.    TECHNIQUE:  3D time-of-flight MR angiogram of the head without  contrast.    COMPARISON: None.    FINDINGS: The major intracranial arteries including the proximal  branches of the anterior cerebral, middle cerebral, and posterior  cerebral arteries appear patent without vascular cutoff. No aneurysm  identified. No significant stenosis.       Impression    IMPRESSION:  Normal MR angiogram of the head.      JONNA ALAS MD       If you have any questions or concerns, please call the clinic at the number listed above.       Sincerely,        [facility] MRI ROOM 1

## 2019-02-01 NOTE — LETTER
February 11, 2019      Melo Brewer  [address]  [facility] [address]        Dear Melo, your recent test results are attached.   MRA of the carotid arteries demonstrates a 50-70% stenosis on the right.     Feel free to contact me via the office or My Chart if you have any questions regarding the above.     Resulted Orders   MRA Neck (Carotids) wo & w Contrast    Narrative    MRA NECK WITHOUT AND WITH CONTRAST  2/1/2019 5:12 PM     HISTORY: Carotid stenosis; amaurosis left;     TECHNIQUE: 2D time-of-flight MR angiogram of the neck without contrast  and 3D MR angiogram of the neck with  7 mL Gadavist. Estimates of  carotid stenoses are made relative to the distal internal carotid  artery diameters except as noted.     COMPARISON: None.     FINDINGS:  Normal origin of the great vessels from the aortic arch.     Right carotid artery: The right common and internal carotid arteries  are patent. Luminal irregularity at the right carotid bifurcation  resulting in moderate approximately 50-70% stenosis by NASCET  criteria.    Left carotid artery: The left common and internal carotid arteries are  patent. Mild luminal irregularity at the left carotid bifurcation  likely due to atherosclerotic disease without significant stenosis by  NASCET criteria.     Vertebral arteries: Vertebral arteries appear patent without evidence  of dissection. No significant stenosis.       Impression    IMPRESSION:    1. Atherosclerotic disease at the right carotid bifurcation resulting  in moderate approximately 50-70% stenosis by NASCET criteria.  2. Mild atherosclerotic disease at the left carotid bifurcation  without stenosis.     JONNA ALAS MD       If you have any questions or concerns, please call the clinic at the number listed above.       Sincerely,        [facility] MRI ROOM 1

## 2019-02-06 ENCOUNTER — OFFICE VISIT (OUTPATIENT)
Dept: FAMILY MEDICINE | Facility: OTHER | Age: 59
End: 2019-02-06
Payer: COMMERCIAL

## 2019-02-06 VITALS
BODY MASS INDEX: 24.69 KG/M2 | TEMPERATURE: 96.4 F | HEART RATE: 98 BPM | RESPIRATION RATE: 16 BRPM | SYSTOLIC BLOOD PRESSURE: 144 MMHG | DIASTOLIC BLOOD PRESSURE: 100 MMHG | OXYGEN SATURATION: 99 % | WEIGHT: 153 LBS

## 2019-02-06 DIAGNOSIS — I65.23 BILATERAL CAROTID ARTERY STENOSIS: Primary | ICD-10-CM

## 2019-02-06 PROCEDURE — 99214 OFFICE O/P EST MOD 30 MIN: CPT | Performed by: INTERNAL MEDICINE

## 2019-02-06 RX ORDER — ASPIRIN 81 MG/1
81 TABLET, CHEWABLE ORAL DAILY
Status: ON HOLD | COMMUNITY
End: 2019-02-26

## 2019-02-06 ASSESSMENT — PAIN SCALES - GENERAL: PAINLEVEL: NO PAIN (0)

## 2019-02-06 NOTE — PROGRESS NOTES
SUBJECTIVE:   Melo Brewer is a 58 year old male who presents to clinic today for the following health issues:      Chief Complaint   Patient presents with     Follow Up     MRI results                            Chief Complaint         The patient is a pleasant 58-year-old gentleman who recently had temporary vision loss in his right eye.  This was believed to be the result of vascular amaurosis and as such, MRA of the carotid and cerebral vasculature was obtained.  He presents today with his spouse for discussion of his results.  Notably, MRA of the head is quite unremarkable.  The MRA of the carotids however shows a 50-70 % bifurcation irregularity.  This may be consistent with his previous symptoms.  He has had no recurrence of symptoms since the initial presentation.                       PAST, FAMILY,SOCIAL HISTORY:     Medical  History:   has no past medical history on file.     Surgical History:   has a past surgical history that includes REMOVAL OF TONSILS,<13 Y/O (1967); EXCIS PRIMARY GANGLION WRIST (12/23/2004); surgical history of -  (4/24/2009); INJ EPIDURAL LUMBAR/SACRAL W/WO CONTRAST (2009); back surgery; and Colonoscopy (N/A, 5/19/2017).     Social History:   reports that he has been smoking cigarettes.  He has a 7.50 pack-year smoking history. he has never used smokeless tobacco. He reports that he drinks alcohol. He reports that he does not use drugs.     Family History:  family history includes Hyperlipidemia in his father; Hypertension in his brother and father.            MEDICATIONS  Current Outpatient Medications   Medication Sig Dispense Refill     Acetaminophen (TYLENOL PO) Take 1,000 mg by mouth every 6 hours as needed for mild pain or fever       aspirin (ASA) 81 MG chewable tablet Take 81 mg by mouth daily       ibuprofen (ADVIL/MOTRIN) 200 MG tablet Take 4 tablets (800 mg) by mouth every 8 hours as needed for pain (with food)            --------------------------------------------------------------------------------------------------------------------                              Review of Systems       LUNGS: Pt denies: cough, excess sputum, hemoptysis, or shortness of breath.   HEART: Pt denies: chest pain, arrhythmia, syncope, tachy or bradyarrhythmia.   GI: Pt denies: nausea, vomiting, diarrhea, constipation, melena, or hematochezia.   NEURO: Pt denies: seizures, strokes, diplopia, weakness, paraesthesias, or paralysis.   SKIN: Pt denies: itching, rashes, discoloration, or specific lesions of concern. Denies recent hair loss.   PSYCH: The patient denies significant depression, anxiety, mood imbalance. Specifically denies any suicidal ideation.                                     Examination      BP (!) 144/100 (BP Location: Left arm, Patient Position: Sitting, Cuff Size: Adult Regular)   Pulse 98   Temp 96.4  F (35.8  C) (Temporal)   Resp 16   Wt 69.4 kg (153 lb)   SpO2 99%   BMI 24.69 kg/m       LUNGS: clear bilaterally, airflow is brisk, no intercostal retraction or stridor is noted. No coughing is noted during visit.   HEART:  regular without rubs, clicks, gallops, or murmurs. PMI is nondisplaced. Upstrokes are brisk. S1,S2 are heard.  At this time I cannot hear a carotid bruit. GI: Abdomen is soft, without rebound, guarding or tenderness. Bowel sounds are appropriate. No renal bruits are heard.   NEURO: Pt is alert and appropriate. No neurologic lateralization is noted. Cranial nerves 2-12 are intact. Peripheral sensory and motor function are grossly normal.    SKIN:  warm and dry. No erythema, or rashes are noted. No specific lesions of concern are noted.    PSYCH: The patient appears grossly appropriate. Maintains good eye contact, does not have any jittery or atypical motion. Displays appropriate affect.                                           Decision Making  1. Bilateral carotid artery stenosis  Given his symptoms and  the consistency of the symptoms with the findings, I would like to set him up with vascular surgery for evaluation.  He is instructed to continue the aspirin 81 mg daily.  We have discussed initiation of statin therapy but he like to hold off on this until after the vascular evaluation.  Additionally, blood pressure is little high today but he is rather nervous regarding the results.  I recommended home blood pressure monitoring and medication will be initiated accordingly.  At this time, he is extremely hesitant to initiate any medication.  - VASCULAR SURGERY REFERRAL                             FOLLOW UP   I have asked the patient to make an appointment for followup with me following vascular evaluation        Approximately 45 minutes were spent with the patient in direct face-to-face discussion regarding diagnosis and treatment options pertinent to current problems.        I have carefully explained the diagnosis and treatment options to the patient.  The patient has displayed an understanding of the above, and all subsequent questions were answered.      DO LORENZO Aguilar    Portions of this note were produced using [employer]ed[employer]  Although every attempt at real-time proof reading has been made, occasional grammar/syntax errors may have been missed.

## 2019-02-07 ENCOUNTER — TELEPHONE (OUTPATIENT)
Dept: OTHER | Facility: CLINIC | Age: 59
End: 2019-02-07

## 2019-02-07 DIAGNOSIS — I65.29 CAROTID STENOSIS: Primary | ICD-10-CM

## 2019-02-07 NOTE — TELEPHONE ENCOUNTER
Pt referred to [facility] by Toi Wang DO  for bilateral carotid artery stenosis.     Patient has MRA neck in [facility] on 2/1/19.     Pt needs to be scheduled for bilateral carotid US and consult with vascular surgery.  Will route to scheduling to coordinate an appointment at next available.    Mora Lang, SITAN, RN

## 2019-02-10 NOTE — RESULT ENCOUNTER NOTE
Dear Melo, your recent test results are attached.  MRA of the carotid arteries demonstrates a 50-70% stenosis on the right.    Feel free to contact me via the office or My Chart if you have any questions regarding the above.

## 2019-02-10 NOTE — RESULT ENCOUNTER NOTE
Dear Melo, your recent test results are attached.  The sedimentation rate is normal.  This would suggest a low risk of systemic inflammation.  Blood cell count is normal without evidence of anemia or leukemia.    Feel free to contact me via the office or My Chart if you have any questions regarding the above.

## 2019-02-10 NOTE — RESULT ENCOUNTER NOTE
Dear Melo, your recent test results are attached.  MRA of the brain is normal.    Feel free to contact me via the office or My Chart if you have any questions regarding the above.

## 2019-02-19 ENCOUNTER — HOSPITAL ENCOUNTER (OUTPATIENT)
Dept: ULTRASOUND IMAGING | Facility: CLINIC | Age: 59
Discharge: HOME | End: 2019-02-19
Attending: SURGERY | Admitting: SURGERY
Payer: COMMERCIAL

## 2019-02-19 ENCOUNTER — OFFICE VISIT (OUTPATIENT)
Dept: OTHER | Facility: CLINIC | Age: 59
End: 2019-02-19
Attending: SURGERY
Payer: COMMERCIAL

## 2019-02-19 ENCOUNTER — TELEPHONE (OUTPATIENT)
Dept: OTHER | Facility: CLINIC | Age: 59
End: 2019-02-19

## 2019-02-19 VITALS — SYSTOLIC BLOOD PRESSURE: 153 MMHG | HEART RATE: 70 BPM | DIASTOLIC BLOOD PRESSURE: 85 MMHG

## 2019-02-19 DIAGNOSIS — I65.21 SYMPTOMATIC CAROTID ARTERY STENOSIS, RIGHT: Primary | ICD-10-CM

## 2019-02-19 DIAGNOSIS — I65.29 CAROTID STENOSIS: ICD-10-CM

## 2019-02-19 PROCEDURE — 99244 OFF/OP CNSLTJ NEW/EST MOD 40: CPT | Mod: ZP | Performed by: SURGERY

## 2019-02-19 PROCEDURE — 93880 EXTRACRANIAL BILAT STUDY: CPT

## 2019-02-19 PROCEDURE — G0463 HOSPITAL OUTPT CLINIC VISIT: HCPCS | Mod: 25

## 2019-02-19 RX ORDER — METOPROLOL SUCCINATE 25 MG/1
25 TABLET, EXTENDED RELEASE ORAL DAILY
Qty: 90 TABLET | Refills: 3 | Status: SHIPPED | OUTPATIENT
Start: 2019-02-19 | End: 2020-02-04

## 2019-02-19 RX ORDER — ATORVASTATIN CALCIUM 40 MG/1
40 TABLET, FILM COATED ORAL DAILY
Status: DISCONTINUED | OUTPATIENT
Start: 2019-02-19 | End: 2021-02-17

## 2019-02-19 RX ORDER — NICOTINE 21 MG/24HR
1 PATCH, TRANSDERMAL 24 HOURS TRANSDERMAL EVERY 24 HOURS
Qty: 90 PATCH | Refills: 3 | Status: ON HOLD | OUTPATIENT
Start: 2019-02-19 | End: 2019-02-26

## 2019-02-19 NOTE — LETTER
[facility] [address]  [address]  [place] [address]  Phone: [phone]  Fax: [phone]      2019    RE: Melo Brewer, : 1960      Mr. Brewer is a right-hand-dominant 58-year-old male who is been sent as a kind consultation from Dr. Wang, our colleague in internal medicine.  Patient recalls that he was at work about a month ago when he experienced an episode of blindness in his right eye.  He specifically reports that he felt a gray curtain coming across his entire upper and lower visual field.  This lasted a few minutes.  He did not have any motor or sensory weakness on either side of his body with that there was no facial drooping or speech difficulty noted with it.  He has not had any similar episodes since that time.     Past medical history: Patient does not report diabetes, hypertension, hyperlipidemia, cerebrovascular accident or coronary artery disease.     Past surgical history: Spine surgery     Social history: He smokes about 8-9 cigarettes/day.  He is ready to quit.     Review of systems is as noted in history of presenting illness.     On examination:  On examination: He appears comfortable and is in no acute distress.  Vital signs are reviewed.  HEENT: Head is Normocephalic, mucosa pink.  Eyes: Extraocular motions are intact, sclerae are anicteric.  Mental: Alert and oriented x4, judgment insight are good.  Cardiac: Irregular rate and rhythm, S1 plus S2 +0.  Chest: Clear to auscultation bilaterally.  Abdomen: Soft and nontender.  Vascular: No carotid bruits, 3+ bilateral radial pulses, 3+ bilateral femoral pulses.    Neuro: He is moving both upper and lower extremities with equal 5 out of 5 power.      Imaging data: He underwent an MRA of the neck which showed 50-70% stenosis of the right internal carotid artery.  There is mild luminal irregularity of the left cervical internal carotid artery.  On duplex sonography his right internal  carotid artery peak systolic velocity is 263 cm/s and end-diastolic velocity is 160 cm/s.     Diagnosis: Moderate to severe symptomatic right carotid artery stenosis.     Plan: I explained the pathophysiology of disease to him in detail.  He had an episode of amaurosis fugax and has moderate to severe stenosis of the right internal carotid artery.  My recommendation would be to proceed with right carotid endarterectomy for prevention of future disabling stroke.  We will give him NicoDerm patches and I am going to start him on Lipitor as       well as metoprolol.  He will have his lipid profile checked tomorrow.  I sincerely appreciate this consult from Dr. Wang.         Sincerely,      aSe Ramos MD    [facility]  [address] [place] [address] [address] . [address]  [facility] [address]  Phone: [phone]  Fax: [phone]

## 2019-02-19 NOTE — PROGRESS NOTES
Mr. Brewer is a right-hand-dominant 58-year-old male who is been sent as a kind consultation from Dr. Wang, our colleague in internal medicine.  Patient recalls that he was at work about a month ago when he experienced an episode of blindness in his right eye.  He specifically reports that he felt a gray curtain coming across his entire upper and lower visual field.  This lasted a few minutes.  He did not have any motor or sensory weakness on either side of his body with that there was no facial drooping or speech difficulty noted with it.  He has not had any similar episodes since that time.    Past medical history: Patient does not report diabetes, hypertension, hyperlipidemia, cerebrovascular accident or coronary artery disease.    Past surgical history: Spine surgery    Social history: He smokes about 8-9 cigarettes/day.  He is ready to quit.    Review of systems is as noted in history of presenting illness.    On examination:  On examination: He appears comfortable and is in no acute distress.  Vital signs are reviewed.  HEENT: Head is Normocephalic, mucosa pink.  Eyes: Extraocular motions are intact, sclerae are anicteric.  Mental: Alert and oriented x4, judgment insight are good.  Cardiac: Irregular rate and rhythm, S1 plus S2 +0.  Chest: Clear to auscultation bilaterally.  Abdomen: Soft and nontender.  Vascular: No carotid bruits, 3+ bilateral radial pulses, 3+ bilateral femoral pulses.    Neuro: He is moving both upper and lower extremities with equal 5 out of 5 power.      Imaging data: He underwent an MRA of the neck which showed 50-70% stenosis of the right internal carotid artery.  There is mild luminal irregularity of the left cervical internal carotid artery.  On duplex sonography his right internal carotid artery peak systolic velocity is 263 cm/s and end-diastolic velocity is 160 cm/s.    Diagnosis: Moderate to severe symptomatic right carotid artery stenosis.    Plan: I explained the  pathophysiology of disease to him in detail.  He had an episode of amaurosis fugax and has moderate to severe stenosis of the right internal carotid artery.  My recommendation would be to proceed with right carotid endarterectomy for prevention of future disabling stroke.  We will give him NicoDerm patches and I am going to start him on Lipitor as well as metoprolol.  He will have his lipid profile checked tomorrow.  I sincerely appreciate this consult from Dr. Wang.

## 2019-02-19 NOTE — NURSING NOTE
"Melo Brewer is a 58 year old male who presents for:  Chief Complaint   Patient presents with     Consult     New - referred to [facility] by Toi Wang DO  for bilateral carotid artery stenosis. Patient has MRA neck in [facility] on 2/1/19.        Vitals:    Vitals:    02/19/19 1337 02/19/19 1338   BP: 154/89 153/85   BP Location: Left arm Right arm   Patient Position: Chair Chair   Cuff Size: Adult Regular Adult Regular   Pulse: 69 70       BMI:  Estimated body mass index is 24.69 kg/m  as calculated from the following:    Height as of 1/29/19: 5' 6\" (1.676 m).    Weight as of 2/6/19: 153 lb (69.4 kg).    Pain Score:  Data Unavailable        Karly Almeida MA    "

## 2019-02-19 NOTE — TELEPHONE ENCOUNTER
Type of surgery: RIGHT CAROTID ENDARTERECTOMY WITH EEG AND INTRAOPERATIVE ULTRASOUND  Location of surgery: [facility]  Date and time of surgery: 022619 AT 10:30A  Surgeon: DR JOSE ELIAS LYNCH  Pre-Op Appt Date: 022519  Post-Op Appt Date: UNKNOWN   Packet sent out: GIVEN ON 2/19/19  Pre-cert/Authorization completed:  SENT FOR PA SUBMISSION   Date: 021919 Nesha Leger -  at [facility]

## 2019-02-20 DIAGNOSIS — I65.21 SYMPTOMATIC CAROTID ARTERY STENOSIS, RIGHT: ICD-10-CM

## 2019-02-20 LAB
CHOLEST SERPL-MCNC: 207 MG/DL
HDLC SERPL-MCNC: 61 MG/DL
LDLC SERPL CALC-MCNC: 124 MG/DL
NONHDLC SERPL-MCNC: 146 MG/DL
TRIGL SERPL-MCNC: 111 MG/DL

## 2019-02-20 PROCEDURE — 36415 COLL VENOUS BLD VENIPUNCTURE: CPT | Performed by: SURGERY

## 2019-02-20 PROCEDURE — 80061 LIPID PANEL: CPT | Performed by: SURGERY

## 2019-02-21 ENCOUNTER — TELEPHONE (OUTPATIENT)
Dept: OTHER | Facility: CLINIC | Age: 59
End: 2019-02-21

## 2019-02-21 NOTE — TELEPHONE ENCOUNTER
Received [facility]  2/21/2019 waiting on signature from Dr. Ramos.  Karly Almeida

## 2019-02-21 NOTE — NURSING NOTE
Patient Education    Procedure: right carotid endarterectomy  Diagnosis: right carotid stenosis, symptomatic  Anticoagulation Instruction: n/a  Pre-Operative Physical Exam: You need to have a pre-op physical exam within 30 days of your procedure. Your procedure may be cancelled if you do not have a current History and Physical. Call your PCP's office to schedule.  Allergies:  Updated in Epic  Bowel Prep: n/a  Post Procedure Education: [facility] patient post-procedure fact sheet reviewed with patient.    Learner(s):patient  Method: Listening  Barriers to Learning:No Barrier  Outcome: Patient did verbalize understanding of above education.  Chapis Lang, SITAN, RN

## 2019-02-22 DIAGNOSIS — Z71.6 ENCOUNTER FOR SMOKING CESSATION COUNSELING: Primary | ICD-10-CM

## 2019-02-25 ENCOUNTER — ANESTHESIA EVENT (OUTPATIENT)
Dept: SURGERY | Facility: CLINIC | Age: 59
End: 2019-02-25
Payer: COMMERCIAL

## 2019-02-25 ENCOUNTER — OFFICE VISIT (OUTPATIENT)
Dept: FAMILY MEDICINE | Facility: OTHER | Age: 59
End: 2019-02-25
Payer: COMMERCIAL

## 2019-02-25 VITALS
TEMPERATURE: 98.4 F | DIASTOLIC BLOOD PRESSURE: 86 MMHG | RESPIRATION RATE: 18 BRPM | WEIGHT: 151.19 LBS | SYSTOLIC BLOOD PRESSURE: 140 MMHG | OXYGEN SATURATION: 100 % | HEART RATE: 107 BPM | BODY MASS INDEX: 24.4 KG/M2

## 2019-02-25 DIAGNOSIS — I10 ESSENTIAL HYPERTENSION: ICD-10-CM

## 2019-02-25 DIAGNOSIS — E78.5 HYPERLIPIDEMIA LDL GOAL <100: ICD-10-CM

## 2019-02-25 DIAGNOSIS — Z72.0 TOBACCO ABUSE: ICD-10-CM

## 2019-02-25 DIAGNOSIS — I65.29 OCCLUSION OF CAROTID ARTERY, UNSPECIFIED LATERALITY: ICD-10-CM

## 2019-02-25 DIAGNOSIS — Z01.818 PREOP GENERAL PHYSICAL EXAM: Primary | ICD-10-CM

## 2019-02-25 PROCEDURE — 99214 OFFICE O/P EST MOD 30 MIN: CPT | Performed by: INTERNAL MEDICINE

## 2019-02-25 RX ORDER — ASPIRIN 325 MG
325 TABLET, DELAYED RELEASE (ENTERIC COATED) ORAL DAILY
Refills: 3 | Status: ON HOLD | COMMUNITY
Start: 2019-02-05 | End: 2019-02-27

## 2019-02-25 RX ORDER — NICOTINE 21 MG/24HR
1 PATCH, TRANSDERMAL 24 HOURS TRANSDERMAL EVERY 24 HOURS
Qty: 90 PATCH | Refills: 3 | Status: SHIPPED | OUTPATIENT
Start: 2019-02-25 | End: 2019-05-20

## 2019-02-25 RX ORDER — ATORVASTATIN CALCIUM 40 MG/1
40 TABLET, FILM COATED ORAL DAILY
COMMUNITY
End: 2019-05-29

## 2019-02-25 NOTE — PROGRESS NOTES
[facility]  [address] [facility] [id number] [phone]  Dept: [phone]    PRE-OP EVALUATION:  Today's date: 2019    Melo Brewer (: 1960) presents for pre-operative evaluation assessment as requested by Dr. Ramos.  He requires evaluation and anesthesia risk assessment prior to undergoing surgery/procedure for treatment of Right Carotid Endarterectomy with EEG and Ultrasound  .    Fax number for surgical facility: [facility] [phone]  Primary Physician: Toi Wang  Type of Anesthesia Anticipated: General    Patient has a Health Care Directive or Living Will:  NO    Preop Questions 2019   Who is doing your surgery? bekah ramos   What are you having done? right carotid   Date of Surgery/Procedure: 2019   Facility or Hospital where procedure/surgery will be performed: [facility]   1.  Do you have a history of Heart attack, stroke, stent, coronary bypass surgery, or other heart surgery? No   2.  Do you ever have any pain or discomfort in your chest? No   3.  Do you have a history of  Heart Failure? No   4.   Are you troubled by shortness of breath when:  walking on a level surface, or up a slight hill, or at night? No   5.  Do you currently have a cold, bronchitis or other respiratory infection? No   6.  Do you have a cough, shortness of breath, or wheezing? No   7.  Do you sometimes get pains in the calves of your legs when you walk? No   8. Do you or anyone in your family have previous history of blood clots? No   9.  Do you or does anyone in your family have a serious bleeding problem such as prolonged bleeding following surgeries or cuts? No   10. Have you ever had problems with anemia or been told to take iron pills? No   11. Have you had any abnormal blood loss such as black, tarry or bloody stools? No   12. Have you ever had a blood transfusion? No   13. Have you or any of your relatives ever had problems with  anesthesia? No   14. Do you have sleep apnea, excessive snoring or daytime drowsiness? No   15. Do you have any prosthetic heart valves? No   16. Do you have prosthetic joints? No         HPI:     HPI related to upcoming procedure: Patient has a history of amaurosis fugax with subsequent workup determining carotid artery disease.      See problem list for active medical problems.  Problems all longstanding and stable, except as noted/documented.  See ROS for pertinent symptoms related to these conditions.                                                                                                                                                          .    MEDICAL HISTORY:     Patient Active Problem List    Diagnosis Date Noted     Advanced directives, counseling/discussion 03/29/2017     Priority: Medium     declined       CARDIOVASCULAR SCREENING; LDL GOAL LESS THAN 130 10/31/2010     Priority: Medium      History reviewed. No pertinent past medical history.  Past Surgical History:   Procedure Laterality Date     BACK SURGERY       COLONOSCOPY N/A 5/19/2017    Procedure: COLONOSCOPY;  Colonoscopy;  Surgeon: Robert Elmore MD;  Location: [facility]     HC EXCIS PRIMARY GANGLION WRIST  12/23/2004    Left wrist     HC INJ EPIDURAL LUMBAR/SACRAL W/WO CONTRAST  2009     HC REMOVAL OF TONSILS,<13 Y/O  1967    Tonsils <13y.o.     SURGICAL HISTORY OF -   4/24/2009    Left C6-C7 Microdiskectomy.     Current Outpatient Medications   Medication Sig Dispense Refill     Acetaminophen (TYLENOL PO) Take 1,000 mg by mouth every 6 hours as needed for mild pain or fever       ibuprofen (ADVIL/MOTRIN) 200 MG tablet Take 4 tablets (800 mg) by mouth every 8 hours as needed for pain (with food)       metoprolol succinate ER (TOPROL-XL) 25 MG 24 hr tablet Take 1 tablet (25 mg) by mouth daily 90 tablet 3     nicotine (NICODERM CQ) 14 MG/24HR 24 hr patch Place 1 patch onto the skin every 24 hours 90 patch 3     aspirin (ASA) 325 MG EC  tablet Take 325 mg by mouth daily  3     aspirin (ASA) 81 MG chewable tablet Take 81 mg by mouth daily       atorvastatin (LIPITOR) 40 MG tablet Take 40 mg by mouth daily       OTC products: None, except as noted above    Allergies   Allergen Reactions     Apple Anaphylaxis     Bees Anaphylaxis      Latex Allergy: NO    Social History     Tobacco Use     Smoking status: Current Every Day Smoker     Packs/day: 0.50     Years: 15.00     Pack years: 7.50     Types: Cigarettes     Smokeless tobacco: Never Used   Substance Use Topics     Alcohol use: Yes     Comment: 1-2 daily     History   Drug Use No       REVIEW OF SYSTEMS:   CONSTITUTIONAL: NEGATIVE for fever, chills, change in weight  INTEGUMENTARY/SKIN: NEGATIVE for worrisome rashes, moles or lesions  EYES: NEGATIVE for vision changes or irritation  ENT/MOUTH: NEGATIVE for ear, mouth and throat problems  RESP: NEGATIVE for significant cough or SOB  CV: NEGATIVE for chest pain, palpitations or peripheral edema  GI: NEGATIVE for nausea, abdominal pain, heartburn, or change in bowel habits  : NEGATIVE for frequency, dysuria, or hematuria  MUSCULOSKELETAL: NEGATIVE for significant arthralgias or myalgia  NEURO: NEGATIVE for weakness, dizziness or paresthesias  ENDOCRINE: NEGATIVE for temperature intolerance, skin/hair changes  HEME: NEGATIVE for bleeding problems  PSYCHIATRIC: NEGATIVE for changes in mood or affect    EXAM:   There were no vitals taken for this visit.    GENERAL APPEARANCE: healthy, alert and no distress     EYES: EOMI,  PERRL     HENT: ear canals and TM's normal and nose and mouth without ulcers or lesions     NECK: no adenopathy, no asymmetry, masses, or scars and thyroid normal to palpation     RESP: lungs clear to auscultation - no rales, rhonchi or wheezes     CV: regular rates and rhythm, normal S1 S2, no S3 or S4 and no murmur, click or rub     ABDOMEN:  soft, nontender, no HSM or masses and bowel sounds normal     MS: extremities normal- no  gross deformities noted, no evidence of inflammation in joints, FROM in all extremities.     SKIN: no suspicious lesions or rashes     NEURO: Normal strength and tone, sensory exam grossly normal, mentation intact and speech normal     PSYCH: mentation appears normal. and affect normal/bright     LYMPHATICS: No cervical adenopathy    DIAGNOSTICS:   EKG and laboratory work have been ordered by the attending surgeon and are not yet available.      IMPRESSION:   Reason for surgery/procedure: Previous episode of amaurosis fugax suggesting vascular disease.  Workup confirms carotid artery disease.  Diagnosis/reason for consult: Perioperative risk stratification in a pleasant 58-year-old male with:  1. Preop general physical exam    2. Occlusion of carotid artery, unspecified laterality    3. Essential hypertension    4. Hyperlipidemia LDL goal <100    5. Tobacco abuse    The proposed surgical procedure is considered INTERMEDIATE risk.    REVISED CARDIAC RISK INDEX  The patient has the following serious cardiovascular risks for perioperative complications such as (MI, PE, VFib and 3  AV Block):  No serious cardiac risks  INTERPRETATION: 1 risks: Class II (low risk - 0.9% complication rate)    The patient has the following additional risks for perioperative complications:  No identified additional risks      ICD-10-CM    1. Preop general physical exam Z01.818        RECOMMENDATIONS:         --Patient is to take all scheduled medications on the day of surgery.  APPROVAL GIVEN to proceed with proposed procedure, without further diagnostic evaluation       Signed Electronically by: Toi Wang DO    Copy of this evaluation report is provided to requesting physician.    [facility] Preop Guidelines    Revised Cardiac Risk Index

## 2019-02-25 NOTE — H&P (VIEW-ONLY)
[facility]  [address] [facility] [id number] [phone]  Dept: [phone]    PRE-OP EVALUATION:  Today's date: 2019    Melo Brewer (: 1960) presents for pre-operative evaluation assessment as requested by Dr. Ramos.  He requires evaluation and anesthesia risk assessment prior to undergoing surgery/procedure for treatment of Right Carotid Endarterectomy with EEG and Ultrasound  .    Fax number for surgical facility: [facility] [phone]  Primary Physician: Toi Wang  Type of Anesthesia Anticipated: General    Patient has a Health Care Directive or Living Will:  NO    Preop Questions 2019   Who is doing your surgery? bekah ramos   What are you having done? right carotid   Date of Surgery/Procedure: 2019   Facility or Hospital where procedure/surgery will be performed: [facility]   1.  Do you have a history of Heart attack, stroke, stent, coronary bypass surgery, or other heart surgery? No   2.  Do you ever have any pain or discomfort in your chest? No   3.  Do you have a history of  Heart Failure? No   4.   Are you troubled by shortness of breath when:  walking on a level surface, or up a slight hill, or at night? No   5.  Do you currently have a cold, bronchitis or other respiratory infection? No   6.  Do you have a cough, shortness of breath, or wheezing? No   7.  Do you sometimes get pains in the calves of your legs when you walk? No   8. Do you or anyone in your family have previous history of blood clots? No   9.  Do you or does anyone in your family have a serious bleeding problem such as prolonged bleeding following surgeries or cuts? No   10. Have you ever had problems with anemia or been told to take iron pills? No   11. Have you had any abnormal blood loss such as black, tarry or bloody stools? No   12. Have you ever had a blood transfusion? No   13. Have you or any of your relatives ever had problems with  anesthesia? No   14. Do you have sleep apnea, excessive snoring or daytime drowsiness? No   15. Do you have any prosthetic heart valves? No   16. Do you have prosthetic joints? No         HPI:     HPI related to upcoming procedure: Patient has a history of amaurosis fugax with subsequent workup determining carotid artery disease.      See problem list for active medical problems.  Problems all longstanding and stable, except as noted/documented.  See ROS for pertinent symptoms related to these conditions.                                                                                                                                                          .    MEDICAL HISTORY:     Patient Active Problem List    Diagnosis Date Noted     Advanced directives, counseling/discussion 03/29/2017     Priority: Medium     declined       CARDIOVASCULAR SCREENING; LDL GOAL LESS THAN 130 10/31/2010     Priority: Medium      History reviewed. No pertinent past medical history.  Past Surgical History:   Procedure Laterality Date     BACK SURGERY       COLONOSCOPY N/A 5/19/2017    Procedure: COLONOSCOPY;  Colonoscopy;  Surgeon: Robert Elmore MD;  Location: [facility]     HC EXCIS PRIMARY GANGLION WRIST  12/23/2004    Left wrist     HC INJ EPIDURAL LUMBAR/SACRAL W/WO CONTRAST  2009     HC REMOVAL OF TONSILS,<11 Y/O  1967    Tonsils <13y.o.     SURGICAL HISTORY OF -   4/24/2009    Left C6-C7 Microdiskectomy.     Current Outpatient Medications   Medication Sig Dispense Refill     Acetaminophen (TYLENOL PO) Take 1,000 mg by mouth every 6 hours as needed for mild pain or fever       ibuprofen (ADVIL/MOTRIN) 200 MG tablet Take 4 tablets (800 mg) by mouth every 8 hours as needed for pain (with food)       metoprolol succinate ER (TOPROL-XL) 25 MG 24 hr tablet Take 1 tablet (25 mg) by mouth daily 90 tablet 3     nicotine (NICODERM CQ) 14 MG/24HR 24 hr patch Place 1 patch onto the skin every 24 hours 90 patch 3     aspirin (ASA) 325 MG EC  tablet Take 325 mg by mouth daily  3     aspirin (ASA) 81 MG chewable tablet Take 81 mg by mouth daily       atorvastatin (LIPITOR) 40 MG tablet Take 40 mg by mouth daily       OTC products: None, except as noted above    Allergies   Allergen Reactions     Apple Anaphylaxis     Bees Anaphylaxis      Latex Allergy: NO    Social History     Tobacco Use     Smoking status: Current Every Day Smoker     Packs/day: 0.50     Years: 15.00     Pack years: 7.50     Types: Cigarettes     Smokeless tobacco: Never Used   Substance Use Topics     Alcohol use: Yes     Comment: 1-2 daily     History   Drug Use No       REVIEW OF SYSTEMS:   CONSTITUTIONAL: NEGATIVE for fever, chills, change in weight  INTEGUMENTARY/SKIN: NEGATIVE for worrisome rashes, moles or lesions  EYES: NEGATIVE for vision changes or irritation  ENT/MOUTH: NEGATIVE for ear, mouth and throat problems  RESP: NEGATIVE for significant cough or SOB  CV: NEGATIVE for chest pain, palpitations or peripheral edema  GI: NEGATIVE for nausea, abdominal pain, heartburn, or change in bowel habits  : NEGATIVE for frequency, dysuria, or hematuria  MUSCULOSKELETAL: NEGATIVE for significant arthralgias or myalgia  NEURO: NEGATIVE for weakness, dizziness or paresthesias  ENDOCRINE: NEGATIVE for temperature intolerance, skin/hair changes  HEME: NEGATIVE for bleeding problems  PSYCHIATRIC: NEGATIVE for changes in mood or affect    EXAM:   There were no vitals taken for this visit.    GENERAL APPEARANCE: healthy, alert and no distress     EYES: EOMI,  PERRL     HENT: ear canals and TM's normal and nose and mouth without ulcers or lesions     NECK: no adenopathy, no asymmetry, masses, or scars and thyroid normal to palpation     RESP: lungs clear to auscultation - no rales, rhonchi or wheezes     CV: regular rates and rhythm, normal S1 S2, no S3 or S4 and no murmur, click or rub     ABDOMEN:  soft, nontender, no HSM or masses and bowel sounds normal     MS: extremities normal- no  gross deformities noted, no evidence of inflammation in joints, FROM in all extremities.     SKIN: no suspicious lesions or rashes     NEURO: Normal strength and tone, sensory exam grossly normal, mentation intact and speech normal     PSYCH: mentation appears normal. and affect normal/bright     LYMPHATICS: No cervical adenopathy    DIAGNOSTICS:   EKG and laboratory work have been ordered by the attending surgeon and are not yet available.      IMPRESSION:   Reason for surgery/procedure: Previous episode of amaurosis fugax suggesting vascular disease.  Workup confirms carotid artery disease.  Diagnosis/reason for consult: Perioperative risk stratification in a pleasant 58-year-old male with:  1. Preop general physical exam    2. Occlusion of carotid artery, unspecified laterality    3. Essential hypertension    4. Hyperlipidemia LDL goal <100    5. Tobacco abuse    The proposed surgical procedure is considered INTERMEDIATE risk.    REVISED CARDIAC RISK INDEX  The patient has the following serious cardiovascular risks for perioperative complications such as (MI, PE, VFib and 3  AV Block):  No serious cardiac risks  INTERPRETATION: 1 risks: Class II (low risk - 0.9% complication rate)    The patient has the following additional risks for perioperative complications:  No identified additional risks      ICD-10-CM    1. Preop general physical exam Z01.818        RECOMMENDATIONS:         --Patient is to take all scheduled medications on the day of surgery.  APPROVAL GIVEN to proceed with proposed procedure, without further diagnostic evaluation       Signed Electronically by: Toi Wang DO    Copy of this evaluation report is provided to requesting physician.    [facility] Preop Guidelines    Revised Cardiac Risk Index

## 2019-02-26 ENCOUNTER — ANESTHESIA (OUTPATIENT)
Dept: SURGERY | Facility: CLINIC | Age: 59
End: 2019-02-26
Payer: COMMERCIAL

## 2019-02-26 ENCOUNTER — HOSPITAL ENCOUNTER (OUTPATIENT)
Dept: ULTRASOUND IMAGING | Facility: CLINIC | Age: 59
End: 2019-02-26
Attending: SURGERY | Admitting: SURGERY
Payer: COMMERCIAL

## 2019-02-26 ENCOUNTER — OFFICE VISIT (OUTPATIENT)
Dept: SURGERY | Facility: PHYSICIAN GROUP | Age: 59
End: 2019-02-26
Payer: COMMERCIAL

## 2019-02-26 ENCOUNTER — HOSPITAL ENCOUNTER (INPATIENT)
Facility: CLINIC | Age: 59
LOS: 1 days | Discharge: HOME | End: 2019-02-27
Attending: SURGERY | Admitting: SURGERY
Payer: COMMERCIAL

## 2019-02-26 DIAGNOSIS — I65.29 STENOSIS OF CAROTID ARTERY, UNSPECIFIED LATERALITY: ICD-10-CM

## 2019-02-26 DIAGNOSIS — I65.21 CAROTID STENOSIS, RIGHT: ICD-10-CM

## 2019-02-26 DIAGNOSIS — I10 BENIGN ESSENTIAL HYPERTENSION: ICD-10-CM

## 2019-02-26 DIAGNOSIS — I65.21 STENOSIS OF RIGHT CAROTID ARTERY: Primary | ICD-10-CM

## 2019-02-26 PROBLEM — I77.9 CAROTID ARTERY DISEASE (H): Status: ACTIVE | Noted: 2019-02-26

## 2019-02-26 LAB
ABO + RH BLD: NORMAL
ABO + RH BLD: NORMAL
ANION GAP SERPL CALCULATED.3IONS-SCNC: 7 MMOL/L (ref 3–14)
BLD GP AB SCN SERPL QL: NORMAL
BLOOD BANK CMNT PATIENT-IMP: NORMAL
BUN SERPL-MCNC: 20 MG/DL (ref 7–30)
CALCIUM SERPL-MCNC: 9.2 MG/DL (ref 8.5–10.1)
CHLORIDE SERPL-SCNC: 107 MMOL/L (ref 94–109)
CO2 SERPL-SCNC: 26 MMOL/L (ref 20–32)
CREAT SERPL-MCNC: 0.77 MG/DL (ref 0.66–1.25)
CREAT SERPL-MCNC: 0.96 MG/DL (ref 0.66–1.25)
GFR SERPL CREATININE-BSD FRML MDRD: 87 ML/MIN/{1.73_M2}
GFR SERPL CREATININE-BSD FRML MDRD: >90 ML/MIN/{1.73_M2}
GLUCOSE SERPL-MCNC: 102 MG/DL (ref 70–99)
HBA1C MFR BLD: 5.1 % (ref 0–5.6)
PLATELET # BLD AUTO: 236 10E9/L (ref 150–450)
POTASSIUM SERPL-SCNC: 3.9 MMOL/L (ref 3.4–5.3)
SODIUM SERPL-SCNC: 140 MMOL/L (ref 133–144)
SPECIMEN EXP DATE BLD: NORMAL

## 2019-02-26 PROCEDURE — 25000128 H RX IP 250 OP 636: Performed by: NURSE ANESTHETIST, CERTIFIED REGISTERED

## 2019-02-26 PROCEDURE — 27211022 ZZHC OR IOM SUPPLIES OPNP: Performed by: SURGERY

## 2019-02-26 PROCEDURE — 82565 ASSAY OF CREATININE: CPT | Performed by: INTERNAL MEDICINE

## 2019-02-26 PROCEDURE — 25000128 H RX IP 250 OP 636: Performed by: SURGERY

## 2019-02-26 PROCEDURE — 25000132 ZZH RX MED GY IP 250 OP 250 PS 637: Performed by: INTERNAL MEDICINE

## 2019-02-26 PROCEDURE — 25000566 ZZH SEVOFLURANE, EA 15 MIN: Performed by: SURGERY

## 2019-02-26 PROCEDURE — 86901 BLOOD TYPING SEROLOGIC RH(D): CPT | Performed by: SURGERY

## 2019-02-26 PROCEDURE — C1758 CATHETER, URETERAL: HCPCS | Performed by: SURGERY

## 2019-02-26 PROCEDURE — 03CK0ZZ EXTIRPATION OF MATTER FROM RIGHT INTERNAL CAROTID ARTERY, OPEN APPROACH: ICD-10-PCS | Performed by: SURGERY

## 2019-02-26 PROCEDURE — 25800030 ZZH RX IP 258 OP 636: Performed by: NURSE ANESTHETIST, CERTIFIED REGISTERED

## 2019-02-26 PROCEDURE — 71000012 ZZH RECOVERY PHASE 1 LEVEL 1 FIRST HR: Performed by: SURGERY

## 2019-02-26 PROCEDURE — 37000008 ZZH ANESTHESIA TECHNICAL FEE, 1ST 30 MIN: Performed by: SURGERY

## 2019-02-26 PROCEDURE — 12000000 ZZH R&B MED SURG/OB

## 2019-02-26 PROCEDURE — 36000063 ZZH SURGERY LEVEL 4 EA 15 ADDTL MIN: Performed by: SURGERY

## 2019-02-26 PROCEDURE — 95940 IONM IN OPERATNG ROOM 15 MIN: CPT | Performed by: SURGERY

## 2019-02-26 PROCEDURE — 85049 AUTOMATED PLATELET COUNT: CPT | Performed by: INTERNAL MEDICINE

## 2019-02-26 PROCEDURE — 25800030 ZZH RX IP 258 OP 636: Performed by: ANESTHESIOLOGY

## 2019-02-26 PROCEDURE — 35301 RECHANNELING OF ARTERY: CPT | Mod: RT | Performed by: SURGERY

## 2019-02-26 PROCEDURE — 93010 ELECTROCARDIOGRAM REPORT: CPT | Performed by: INTERNAL MEDICINE

## 2019-02-26 PROCEDURE — C1763 CONN TISS, NON-HUMAN: HCPCS | Performed by: SURGERY

## 2019-02-26 PROCEDURE — 36415 COLL VENOUS BLD VENIPUNCTURE: CPT | Performed by: INTERNAL MEDICINE

## 2019-02-26 PROCEDURE — 25800030 ZZH RX IP 258 OP 636: Performed by: SURGERY

## 2019-02-26 PROCEDURE — 36415 COLL VENOUS BLD VENIPUNCTURE: CPT | Performed by: SURGERY

## 2019-02-26 PROCEDURE — 37000009 ZZH ANESTHESIA TECHNICAL FEE, EACH ADDTL 15 MIN: Performed by: SURGERY

## 2019-02-26 PROCEDURE — 40000171 ZZH STATISTIC PRE-PROCEDURE ASSESSMENT III: Performed by: SURGERY

## 2019-02-26 PROCEDURE — 25000125 ZZHC RX 250: Performed by: NURSE ANESTHETIST, CERTIFIED REGISTERED

## 2019-02-26 PROCEDURE — 03UK0KZ SUPPLEMENT RIGHT INTERNAL CAROTID ARTERY WITH NONAUTOLOGOUS TISSUE SUBSTITUTE, OPEN APPROACH: ICD-10-PCS | Performed by: SURGERY

## 2019-02-26 PROCEDURE — 83036 HEMOGLOBIN GLYCOSYLATED A1C: CPT | Performed by: SURGERY

## 2019-02-26 PROCEDURE — 27210794 ZZH OR GENERAL SUPPLY STERILE: Performed by: SURGERY

## 2019-02-26 PROCEDURE — 80048 BASIC METABOLIC PNL TOTAL CA: CPT | Performed by: SURGERY

## 2019-02-26 PROCEDURE — 99223 1ST HOSP IP/OBS HIGH 75: CPT | Performed by: INTERNAL MEDICINE

## 2019-02-26 PROCEDURE — 25000132 ZZH RX MED GY IP 250 OP 250 PS 637: Performed by: STUDENT IN AN ORGANIZED HEALTH CARE EDUCATION/TRAINING PROGRAM

## 2019-02-26 PROCEDURE — 25000125 ZZHC RX 250: Performed by: SURGERY

## 2019-02-26 PROCEDURE — 25000128 H RX IP 250 OP 636: Performed by: ANESTHESIOLOGY

## 2019-02-26 PROCEDURE — 40000985 US INTRAOPERATIVE

## 2019-02-26 PROCEDURE — 86900 BLOOD TYPING SEROLOGIC ABO: CPT | Performed by: SURGERY

## 2019-02-26 PROCEDURE — 86850 RBC ANTIBODY SCREEN: CPT | Performed by: SURGERY

## 2019-02-26 PROCEDURE — 36000093 ZZH SURGERY LEVEL 4 1ST 30 MIN: Performed by: SURGERY

## 2019-02-26 PROCEDURE — 93005 ELECTROCARDIOGRAM TRACING: CPT

## 2019-02-26 PROCEDURE — 71000013 ZZH RECOVERY PHASE 1 LEVEL 1 EA ADDTL HR: Performed by: SURGERY

## 2019-02-26 DEVICE — IMP PATCH PERICARDIUM PHOTOFIX BOVINE 0.8X8CM PFP0.8X8: Type: IMPLANTABLE DEVICE | Site: CAROTID | Status: FUNCTIONAL

## 2019-02-26 RX ORDER — DEXAMETHASONE SODIUM PHOSPHATE 4 MG/ML
INJECTION, SOLUTION INTRA-ARTICULAR; INTRALESIONAL; INTRAMUSCULAR; INTRAVENOUS; SOFT TISSUE PRN
Status: DISCONTINUED | OUTPATIENT
Start: 2019-02-26 | End: 2019-02-26

## 2019-02-26 RX ORDER — POTASSIUM CHLORIDE 7.45 MG/ML
10 INJECTION INTRAVENOUS
Status: DISCONTINUED | OUTPATIENT
Start: 2019-02-26 | End: 2019-02-27 | Stop reason: HOSPADM

## 2019-02-26 RX ORDER — METOPROLOL SUCCINATE 25 MG/1
25 TABLET, EXTENDED RELEASE ORAL DAILY
Status: DISCONTINUED | OUTPATIENT
Start: 2019-02-27 | End: 2019-02-27 | Stop reason: HOSPADM

## 2019-02-26 RX ORDER — LIDOCAINE 40 MG/G
CREAM TOPICAL
Status: DISCONTINUED | OUTPATIENT
Start: 2019-02-26 | End: 2019-02-27 | Stop reason: HOSPADM

## 2019-02-26 RX ORDER — OXYCODONE HYDROCHLORIDE 5 MG/1
5-10 TABLET ORAL
Status: DISCONTINUED | OUTPATIENT
Start: 2019-02-26 | End: 2019-02-26

## 2019-02-26 RX ORDER — HEPARIN SODIUM 1000 [USP'U]/ML
INJECTION, SOLUTION INTRAVENOUS; SUBCUTANEOUS PRN
Status: DISCONTINUED | OUTPATIENT
Start: 2019-02-26 | End: 2019-02-26

## 2019-02-26 RX ORDER — SODIUM CHLORIDE, SODIUM LACTATE, POTASSIUM CHLORIDE, CALCIUM CHLORIDE 600; 310; 30; 20 MG/100ML; MG/100ML; MG/100ML; MG/100ML
INJECTION, SOLUTION INTRAVENOUS CONTINUOUS
Status: DISCONTINUED | OUTPATIENT
Start: 2019-02-26 | End: 2019-02-26 | Stop reason: HOSPADM

## 2019-02-26 RX ORDER — CLOPIDOGREL BISULFATE 75 MG/1
75 TABLET ORAL DAILY
Status: DISCONTINUED | OUTPATIENT
Start: 2019-02-27 | End: 2019-02-27 | Stop reason: HOSPADM

## 2019-02-26 RX ORDER — PROPOFOL 10 MG/ML
INJECTION, EMULSION INTRAVENOUS PRN
Status: DISCONTINUED | OUTPATIENT
Start: 2019-02-26 | End: 2019-02-26

## 2019-02-26 RX ORDER — NALOXONE HYDROCHLORIDE 0.4 MG/ML
.1-.4 INJECTION, SOLUTION INTRAMUSCULAR; INTRAVENOUS; SUBCUTANEOUS
Status: DISCONTINUED | OUTPATIENT
Start: 2019-02-26 | End: 2019-02-26

## 2019-02-26 RX ORDER — POTASSIUM CHLORIDE 1.5 G/1.58G
20-40 POWDER, FOR SOLUTION ORAL
Status: DISCONTINUED | OUTPATIENT
Start: 2019-02-26 | End: 2019-02-27 | Stop reason: HOSPADM

## 2019-02-26 RX ORDER — ONDANSETRON 2 MG/ML
INJECTION INTRAMUSCULAR; INTRAVENOUS PRN
Status: DISCONTINUED | OUTPATIENT
Start: 2019-02-26 | End: 2019-02-26

## 2019-02-26 RX ORDER — ONDANSETRON 2 MG/ML
4 INJECTION INTRAMUSCULAR; INTRAVENOUS EVERY 30 MIN PRN
Status: DISCONTINUED | OUTPATIENT
Start: 2019-02-26 | End: 2019-02-26 | Stop reason: HOSPADM

## 2019-02-26 RX ORDER — SENNOSIDES 8.6 MG
1300 CAPSULE ORAL 2 TIMES DAILY
Status: DISCONTINUED | OUTPATIENT
Start: 2019-02-26 | End: 2019-02-26 | Stop reason: DRUGHIGH

## 2019-02-26 RX ORDER — PROCHLORPERAZINE MALEATE 5 MG
10 TABLET ORAL EVERY 6 HOURS PRN
Status: DISCONTINUED | OUTPATIENT
Start: 2019-02-26 | End: 2019-02-27 | Stop reason: HOSPADM

## 2019-02-26 RX ORDER — GLYCOPYRROLATE 0.2 MG/ML
INJECTION, SOLUTION INTRAMUSCULAR; INTRAVENOUS PRN
Status: DISCONTINUED | OUTPATIENT
Start: 2019-02-26 | End: 2019-02-26

## 2019-02-26 RX ORDER — ACETAMINOPHEN 325 MG/1
650 TABLET ORAL EVERY 4 HOURS PRN
Status: DISCONTINUED | OUTPATIENT
Start: 2019-02-26 | End: 2019-02-26

## 2019-02-26 RX ORDER — ONDANSETRON 2 MG/ML
4 INJECTION INTRAMUSCULAR; INTRAVENOUS EVERY 6 HOURS PRN
Status: DISCONTINUED | OUTPATIENT
Start: 2019-02-26 | End: 2019-02-27 | Stop reason: HOSPADM

## 2019-02-26 RX ORDER — NEOSTIGMINE METHYLSULFATE 1 MG/ML
VIAL (ML) INJECTION PRN
Status: DISCONTINUED | OUTPATIENT
Start: 2019-02-26 | End: 2019-02-26

## 2019-02-26 RX ORDER — POTASSIUM CL/LIDO/0.9 % NACL 10MEQ/0.1L
10 INTRAVENOUS SOLUTION, PIGGYBACK (ML) INTRAVENOUS
Status: DISCONTINUED | OUTPATIENT
Start: 2019-02-26 | End: 2019-02-27 | Stop reason: HOSPADM

## 2019-02-26 RX ORDER — BISACODYL 10 MG
10 SUPPOSITORY, RECTAL RECTAL DAILY PRN
Status: DISCONTINUED | OUTPATIENT
Start: 2019-02-26 | End: 2019-02-27 | Stop reason: HOSPADM

## 2019-02-26 RX ORDER — NICOTINE 21 MG/24HR
1 PATCH, TRANSDERMAL 24 HOURS TRANSDERMAL EVERY 24 HOURS
Status: DISCONTINUED | OUTPATIENT
Start: 2019-02-27 | End: 2019-02-27 | Stop reason: HOSPADM

## 2019-02-26 RX ORDER — OXYCODONE HYDROCHLORIDE 5 MG/1
5-10 TABLET ORAL
Status: DISCONTINUED | OUTPATIENT
Start: 2019-02-26 | End: 2019-02-27 | Stop reason: HOSPADM

## 2019-02-26 RX ORDER — ASPIRIN 600 MG/1
600 SUPPOSITORY RECTAL ONCE
Status: COMPLETED | OUTPATIENT
Start: 2019-02-26 | End: 2019-02-26

## 2019-02-26 RX ORDER — MAGNESIUM HYDROXIDE 1200 MG/15ML
LIQUID ORAL PRN
Status: DISCONTINUED | OUTPATIENT
Start: 2019-02-26 | End: 2019-02-26 | Stop reason: HOSPADM

## 2019-02-26 RX ORDER — ACETAMINOPHEN 325 MG/1
975 TABLET ORAL EVERY 8 HOURS
Status: DISCONTINUED | OUTPATIENT
Start: 2019-02-26 | End: 2019-02-27 | Stop reason: HOSPADM

## 2019-02-26 RX ORDER — ACETAMINOPHEN 325 MG/1
650 TABLET ORAL EVERY 4 HOURS PRN
Status: DISCONTINUED | OUTPATIENT
Start: 2019-03-01 | End: 2019-02-27 | Stop reason: HOSPADM

## 2019-02-26 RX ORDER — CEFAZOLIN SODIUM 2 G/100ML
2 INJECTION, SOLUTION INTRAVENOUS
Status: COMPLETED | OUTPATIENT
Start: 2019-02-26 | End: 2019-02-26

## 2019-02-26 RX ORDER — POLYETHYLENE GLYCOL 3350 17 G/17G
17 POWDER, FOR SOLUTION ORAL DAILY PRN
Status: DISCONTINUED | OUTPATIENT
Start: 2019-02-26 | End: 2019-02-27 | Stop reason: HOSPADM

## 2019-02-26 RX ORDER — FENTANYL CITRATE 50 UG/ML
INJECTION, SOLUTION INTRAMUSCULAR; INTRAVENOUS PRN
Status: DISCONTINUED | OUTPATIENT
Start: 2019-02-26 | End: 2019-02-26

## 2019-02-26 RX ORDER — POTASSIUM CHLORIDE 29.8 MG/ML
20 INJECTION INTRAVENOUS
Status: DISCONTINUED | OUTPATIENT
Start: 2019-02-26 | End: 2019-02-26

## 2019-02-26 RX ORDER — POTASSIUM CHLORIDE 1500 MG/1
20-40 TABLET, EXTENDED RELEASE ORAL
Status: DISCONTINUED | OUTPATIENT
Start: 2019-02-26 | End: 2019-02-27 | Stop reason: HOSPADM

## 2019-02-26 RX ORDER — FENTANYL CITRATE 50 UG/ML
25-50 INJECTION, SOLUTION INTRAMUSCULAR; INTRAVENOUS
Status: DISCONTINUED | OUTPATIENT
Start: 2019-02-26 | End: 2019-02-26 | Stop reason: HOSPADM

## 2019-02-26 RX ORDER — HYDROMORPHONE HYDROCHLORIDE 1 MG/ML
.3-.5 INJECTION, SOLUTION INTRAMUSCULAR; INTRAVENOUS; SUBCUTANEOUS
Status: DISCONTINUED | OUTPATIENT
Start: 2019-02-26 | End: 2019-02-27 | Stop reason: HOSPADM

## 2019-02-26 RX ORDER — LABETALOL 20 MG/4 ML (5 MG/ML) INTRAVENOUS SYRINGE
10 EVERY 10 MIN PRN
Status: DISCONTINUED | OUTPATIENT
Start: 2019-02-26 | End: 2019-02-27 | Stop reason: HOSPADM

## 2019-02-26 RX ORDER — HYDROMORPHONE HYDROCHLORIDE 1 MG/ML
.3-.5 INJECTION, SOLUTION INTRAMUSCULAR; INTRAVENOUS; SUBCUTANEOUS EVERY 5 MIN PRN
Status: DISCONTINUED | OUTPATIENT
Start: 2019-02-26 | End: 2019-02-26 | Stop reason: HOSPADM

## 2019-02-26 RX ORDER — PROCHLORPERAZINE 25 MG
25 SUPPOSITORY, RECTAL RECTAL EVERY 12 HOURS PRN
Status: DISCONTINUED | OUTPATIENT
Start: 2019-02-26 | End: 2019-02-27 | Stop reason: HOSPADM

## 2019-02-26 RX ORDER — ATORVASTATIN CALCIUM 40 MG/1
40 TABLET, FILM COATED ORAL DAILY
Status: DISCONTINUED | OUTPATIENT
Start: 2019-02-26 | End: 2019-02-27 | Stop reason: HOSPADM

## 2019-02-26 RX ORDER — LIDOCAINE HYDROCHLORIDE 20 MG/ML
INJECTION, SOLUTION INFILTRATION; PERINEURAL PRN
Status: DISCONTINUED | OUTPATIENT
Start: 2019-02-26 | End: 2019-02-26

## 2019-02-26 RX ORDER — SENNOSIDES 8.6 MG
1300 CAPSULE ORAL 2 TIMES DAILY
COMMUNITY

## 2019-02-26 RX ORDER — ONDANSETRON 4 MG/1
4 TABLET, ORALLY DISINTEGRATING ORAL EVERY 6 HOURS PRN
Status: DISCONTINUED | OUTPATIENT
Start: 2019-02-26 | End: 2019-02-27 | Stop reason: HOSPADM

## 2019-02-26 RX ORDER — NALOXONE HYDROCHLORIDE 0.4 MG/ML
.1-.4 INJECTION, SOLUTION INTRAMUSCULAR; INTRAVENOUS; SUBCUTANEOUS
Status: DISCONTINUED | OUTPATIENT
Start: 2019-02-26 | End: 2019-02-27 | Stop reason: HOSPADM

## 2019-02-26 RX ORDER — ASPIRIN 81 MG/1
81 TABLET, CHEWABLE ORAL DAILY
Status: DISCONTINUED | OUTPATIENT
Start: 2019-02-27 | End: 2019-02-27 | Stop reason: HOSPADM

## 2019-02-26 RX ORDER — LISINOPRIL 10 MG/1
10 TABLET ORAL DAILY
Status: DISCONTINUED | OUTPATIENT
Start: 2019-02-26 | End: 2019-02-27 | Stop reason: HOSPADM

## 2019-02-26 RX ORDER — EPHEDRINE SULFATE 50 MG/ML
INJECTION, SOLUTION INTRAMUSCULAR; INTRAVENOUS; SUBCUTANEOUS PRN
Status: DISCONTINUED | OUTPATIENT
Start: 2019-02-26 | End: 2019-02-26

## 2019-02-26 RX ORDER — ONDANSETRON 4 MG/1
4 TABLET, ORALLY DISINTEGRATING ORAL EVERY 30 MIN PRN
Status: DISCONTINUED | OUTPATIENT
Start: 2019-02-26 | End: 2019-02-26 | Stop reason: HOSPADM

## 2019-02-26 RX ORDER — IBUPROFEN 400 MG/1
800 TABLET, FILM COATED ORAL EVERY 8 HOURS PRN
Status: DISCONTINUED | OUTPATIENT
Start: 2019-02-26 | End: 2019-02-27 | Stop reason: HOSPADM

## 2019-02-26 RX ADMIN — CEFAZOLIN SODIUM 2 G: 2 INJECTION, SOLUTION INTRAVENOUS at 10:51

## 2019-02-26 RX ADMIN — PHENYLEPHRINE HYDROCHLORIDE 100 MCG: 10 INJECTION, SOLUTION INTRAMUSCULAR; INTRAVENOUS; SUBCUTANEOUS at 11:04

## 2019-02-26 RX ADMIN — PROPOFOL 30 MG: 10 INJECTION, EMULSION INTRAVENOUS at 12:38

## 2019-02-26 RX ADMIN — DEXAMETHASONE SODIUM PHOSPHATE 4 MG: 4 INJECTION, SOLUTION INTRA-ARTICULAR; INTRALESIONAL; INTRAMUSCULAR; INTRAVENOUS; SOFT TISSUE at 11:10

## 2019-02-26 RX ADMIN — ACETAMINOPHEN 975 MG: 325 TABLET, FILM COATED ORAL at 22:27

## 2019-02-26 RX ADMIN — Medication 5 MG: at 11:29

## 2019-02-26 RX ADMIN — LIDOCAINE HYDROCHLORIDE 100 MG: 20 INJECTION, SOLUTION INFILTRATION; PERINEURAL at 10:50

## 2019-02-26 RX ADMIN — PHENYLEPHRINE HYDROCHLORIDE 100 MCG: 10 INJECTION, SOLUTION INTRAMUSCULAR; INTRAVENOUS; SUBCUTANEOUS at 11:37

## 2019-02-26 RX ADMIN — PHENYLEPHRINE HYDROCHLORIDE 100 MCG: 10 INJECTION, SOLUTION INTRAMUSCULAR; INTRAVENOUS; SUBCUTANEOUS at 10:55

## 2019-02-26 RX ADMIN — PHENYLEPHRINE HYDROCHLORIDE 100 MCG: 10 INJECTION, SOLUTION INTRAMUSCULAR; INTRAVENOUS; SUBCUTANEOUS at 12:29

## 2019-02-26 RX ADMIN — PHENYLEPHRINE HYDROCHLORIDE 100 MCG: 10 INJECTION, SOLUTION INTRAMUSCULAR; INTRAVENOUS; SUBCUTANEOUS at 10:50

## 2019-02-26 RX ADMIN — Medication 2.5 MG: at 11:51

## 2019-02-26 RX ADMIN — Medication 0.5 MG: at 14:14

## 2019-02-26 RX ADMIN — PHENYLEPHRINE HYDROCHLORIDE 0.3 MCG/KG/MIN: 10 INJECTION, SOLUTION INTRAMUSCULAR; INTRAVENOUS; SUBCUTANEOUS at 10:50

## 2019-02-26 RX ADMIN — NEOSTIGMINE METHYLSULFATE 3.5 MG: 1 INJECTION, SOLUTION INTRAVENOUS at 12:45

## 2019-02-26 RX ADMIN — FENTANYL CITRATE 25 MCG: 50 INJECTION, SOLUTION INTRAMUSCULAR; INTRAVENOUS at 11:19

## 2019-02-26 RX ADMIN — Medication 20 MCG: at 10:45

## 2019-02-26 RX ADMIN — PHENYLEPHRINE HYDROCHLORIDE 100 MCG: 10 INJECTION, SOLUTION INTRAMUSCULAR; INTRAVENOUS; SUBCUTANEOUS at 11:28

## 2019-02-26 RX ADMIN — PHENYLEPHRINE HYDROCHLORIDE 200 MCG: 10 INJECTION, SOLUTION INTRAMUSCULAR; INTRAVENOUS; SUBCUTANEOUS at 11:15

## 2019-02-26 RX ADMIN — SODIUM CHLORIDE, POTASSIUM CHLORIDE, SODIUM LACTATE AND CALCIUM CHLORIDE: 600; 310; 30; 20 INJECTION, SOLUTION INTRAVENOUS at 09:25

## 2019-02-26 RX ADMIN — ROCURONIUM BROMIDE 50 MG: 10 INJECTION INTRAVENOUS at 10:50

## 2019-02-26 RX ADMIN — FENTANYL CITRATE 50 MCG: 50 INJECTION, SOLUTION INTRAMUSCULAR; INTRAVENOUS at 11:23

## 2019-02-26 RX ADMIN — SODIUM CHLORIDE, POTASSIUM CHLORIDE, SODIUM LACTATE AND CALCIUM CHLORIDE: 600; 310; 30; 20 INJECTION, SOLUTION INTRAVENOUS at 11:55

## 2019-02-26 RX ADMIN — ASPIRIN 600 MG: 600 SUPPOSITORY RECTAL at 13:21

## 2019-02-26 RX ADMIN — DEXMEDETOMIDINE HYDROCHLORIDE 0.2 MCG/KG/HR: 100 INJECTION, SOLUTION INTRAVENOUS at 10:50

## 2019-02-26 RX ADMIN — LISINOPRIL 10 MG: 10 TABLET ORAL at 15:08

## 2019-02-26 RX ADMIN — FENTANYL CITRATE 25 MCG: 50 INJECTION, SOLUTION INTRAMUSCULAR; INTRAVENOUS at 10:45

## 2019-02-26 RX ADMIN — GLYCOPYRROLATE 0.6 MG: 0.2 INJECTION, SOLUTION INTRAMUSCULAR; INTRAVENOUS at 12:45

## 2019-02-26 RX ADMIN — POTASSIUM CHLORIDE 20 MEQ: 1500 TABLET, EXTENDED RELEASE ORAL at 20:30

## 2019-02-26 RX ADMIN — Medication 20 MCG: at 12:40

## 2019-02-26 RX ADMIN — PROPOFOL 250 MG: 10 INJECTION, EMULSION INTRAVENOUS at 10:50

## 2019-02-26 RX ADMIN — Medication 2.5 MG: at 11:57

## 2019-02-26 RX ADMIN — PROPOFOL 60 MG: 10 INJECTION, EMULSION INTRAVENOUS at 11:23

## 2019-02-26 RX ADMIN — ATORVASTATIN CALCIUM 40 MG: 40 TABLET, FILM COATED ORAL at 15:08

## 2019-02-26 RX ADMIN — MIDAZOLAM 1 MG: 1 INJECTION INTRAMUSCULAR; INTRAVENOUS at 10:45

## 2019-02-26 RX ADMIN — OXYCODONE HYDROCHLORIDE 5 MG: 5 TABLET ORAL at 16:43

## 2019-02-26 RX ADMIN — ONDANSETRON 4 MG: 2 INJECTION INTRAMUSCULAR; INTRAVENOUS at 12:33

## 2019-02-26 RX ADMIN — ACETAMINOPHEN 975 MG: 325 TABLET, FILM COATED ORAL at 15:08

## 2019-02-26 RX ADMIN — HEPARIN SODIUM 6000 UNITS: 1000 INJECTION, SOLUTION INTRAVENOUS; SUBCUTANEOUS at 11:34

## 2019-02-26 RX ADMIN — Medication 0.5 MG: at 13:19

## 2019-02-26 ASSESSMENT — ACTIVITIES OF DAILY LIVING (ADL)
DRESS: 4-->COMPLETELY DEPENDENT
FALL_HISTORY_WITHIN_LAST_SIX_MONTHS: NO
AMBULATION: 4-->COMPLETELY DEPENDENT
SWALLOWING: 2-->DIFFICULTY SWALLOWING LIQUIDS/FOODS
ADLS_ACUITY_SCORE: 35
ADLS_ACUITY_SCORE: 35
RETIRED_COMMUNICATION: 3-->UNABLE TO UNDERSTAND (NOT RELATED TO LANGUAGE BARRIER)
TRANSFERRING: 4-->COMPLETELY DEPENDENT
RETIRED_EATING: 4-->COMPLETELY DEPENDENT
TOILETING: 4-->COMPLETELY DEPENDENT
BATHING: 4-->COMPLETELY DEPENDENT
COGNITION: 2 - DIFFICULTY WITH ORGANIZING THOUGHTS

## 2019-02-26 ASSESSMENT — LIFESTYLE VARIABLES: TOBACCO_USE: 1

## 2019-02-26 NOTE — ANESTHESIA PREPROCEDURE EVALUATION
Anesthesia Pre-Procedure Evaluation    Patient: Melo Brewer   MRN: [id number] : 1960          Preoperative Diagnosis: SYMPTOMATIC RIGHT CAROTID STENOSIS    Procedure(s):  RIGHT CAROTID ENDARTERECTOMY WITH EEG AND INTRAOPERATIVE ULTRASOUND    No past medical history on file.  Past Surgical History:   Procedure Laterality Date     BACK SURGERY       COLONOSCOPY N/A 2017    Procedure: COLONOSCOPY;  Colonoscopy;  Surgeon: Robert Elmore MD;  Location: [facility]     HC EXCIS PRIMARY GANGLION WRIST  2004    Left wrist     HC INJ EPIDURAL LUMBAR/SACRAL W/WO CONTRAST       HC REMOVAL OF TONSILS,<13 Y/O  1967    Tonsils <13y.o.     SURGICAL HISTORY OF -   2009    Left C6-C7 Microdiskectomy.       Anesthesia Evaluation     .             ROS/MED HX    ENT/Pulmonary:     (+)tobacco use, Current use , . .    Neurologic:       Cardiovascular: Comment: history of amaurosis fugax with subsequent workup determining carotid artery disease.    (+) -Peripheral Vascular Disease-- Carotid Stenosis, --. : . . . :. . Previous cardiac testing       METS/Exercise Tolerance:     Hematologic:         Musculoskeletal:         GI/Hepatic:         Renal/Genitourinary:         Endo:         Psychiatric:         Infectious Disease:         Malignancy:         Other:                          Physical Exam  Normal systems: cardiovascular, pulmonary and dental    Airway   Mallampati: II  TM distance: >3 FB  Neck ROM: full    Dental     Cardiovascular       Pulmonary             Lab Results   Component Value Date    WBC 8.9 2019    HGB 14.9 2019    HCT 44.3 2019     2019    SED 7 2019     2017    POTASSIUM 3.9 2017    CHLORIDE 106 2017    CO2 32 2017    BUN 26 2017    CR 0.93 2017     (H) 2017    MARIA G 9.4 2017    ALBUMIN 4.3 2017    PROTTOTAL 7.2 2017    ALT 29 2017    AST 18 2017    ALKPHOS 53 2017  "   BILITOTAL 0.4 03/29/2017    TSH 5.77 (H) 03/29/2017    T4 0.74 (L) 03/29/2017       Preop Vitals  BP Readings from Last 3 Encounters:   02/25/19 140/86   02/19/19 153/85   02/06/19 (!) 144/100    Pulse Readings from Last 3 Encounters:   02/25/19 107   02/19/19 70   02/06/19 98      Resp Readings from Last 3 Encounters:   02/25/19 18   02/06/19 16   01/29/19 16    SpO2 Readings from Last 3 Encounters:   02/25/19 100%   02/06/19 99%   01/29/19 100%      Temp Readings from Last 1 Encounters:   02/25/19 36.9  C (98.4  F) (Temporal)    Ht Readings from Last 1 Encounters:   01/29/19 1.676 m (5' 6\")      Wt Readings from Last 1 Encounters:   02/25/19 68.6 kg (151 lb 3 oz)    Estimated body mass index is 24.4 kg/m  as calculated from the following:    Height as of 1/29/19: 1.676 m (5' 6\").    Weight as of 2/25/19: 68.6 kg (151 lb 3 oz).       Anesthesia Plan      History & Physical Review  History and physical reviewed and following examination; no interval change.    ASA Status:  3 .    NPO Status:  > 8 hours    Plan for General and ETT with Propofol and Intravenous induction. Maintenance will be Balanced.    PONV prophylaxis:  Ondansetron (or other 5HT-3)  Additional equipment: Arterial Line      Postoperative Care  Postoperative pain management:  IV analgesics and Multi-modal analgesia.      Consents  Anesthetic plan, risks, benefits and alternatives discussed with:  Patient..                 Arturo Russell, DO  "

## 2019-02-26 NOTE — ANESTHESIA CARE TRANSFER NOTE
Patient: Melo Brewer    Procedure(s):  RIGHT CAROTID ENDARTERECTOMY WITH EEG AND INTRAOPERATIVE ULTRASOUND    Diagnosis: SYMPTOMATIC RIGHT CAROTID STENOSIS  Diagnosis Additional Information: No value filed.    Anesthesia Type:   General, ETT     Note:  Airway :Face Mask  Patient transferred to:PACU  Comments: Neuromuscular blockade reversed after TOF 4/4, spontaneous respirations, adequate tidal volumes, followed commands to voice, oropharynx suctioned with soft flexible catheter, extubated atraumatically, extubated with suction, airway patent after extubation.  Oxygen via facemask at 10 liters per minute to PACU. Oxygen tubing connected to wall O2 in PACU, SpO2, NiBP, and EKG monitors and alarms on and functioning, Sid Hugger warmer connected to patient gown, report on patient's clinical status given to PACU RN. Handoff Report: Identifed the Patient, Identified the Reponsible Provider, Reviewed the pertinent medical history, Discussed the surgical course, Reviewed Intra-OP anesthesia mangement and issues during anesthesia, Set expectations for post-procedure period and Allowed opportunity for questions and acknowledgement of understanding      Vitals: (Last set prior to Anesthesia Care Transfer)    CRNA VITALS  2/26/2019 1232 - 2/26/2019 1308      2/26/2019             Pulse:  64    SpO2:  100 %    Resp Rate (set):  10                Electronically Signed By: SOCORRO Bush CRNA  February 26, 2019  1:08 PM

## 2019-02-26 NOTE — CONSULTS
Consult Date:  02/26/2019      VASCULAR MEDICINE CONSULTATION REQUESTING PHYSICIAN:  Dr. Sae Ramos.      PRIMARY CARE PHYSICIAN:  Toi Wang DO      REASON FOR CONSULTATION:  Atherosclerotic risk factor reduction management in a 58-year-old, recently-smoking male status post episode of right amaurosis fugax secondary to moderate right internal carotid artery stenosis.      IMPRESSION:     1.  Symptomatic right carotid artery stenosis.      The patient will be electively receiving a right carotid endarterectomy on today's date.  Postoperative care will be per Dr. Ramos.  He should resume his aspirin 325 mg daily postoperatively.     2.  Hyperlipidemia with LDL goal of less than 70.      His 02/20/2019 LDL was noted to be 124 on no statins.  He has been placed on Lipitor 40 mg daily.  His LDL goal is less than 70.  At present, I recommend the following:     a. Continue Lipitor 40 mg daily.   b.  Check lipid panel in 3 months.  If LDL greater than 70 at that time, consider addition of Zetia or transition to Crestor 40 mg daily based upon the level.     3.  Hypertension with systolic blood pressure goal of less than 130 in a patient with vascular disease, currently being treated with a beta blocker.      The patient is being treated with a low dose of a beta blocker as delineated below.  His blood pressure is 142/91.  His pulse is 60.  He is not at goal.  Titrating up on his beta blocker would likely lead to significant bradycardia.  At present, I therefore recommend the following:   a.  Initiate lisinopril 10 mg daily, titrate up on blood pressure meds based upon subsequent readings.      CHIEF COMPLAINT:  Right amaurosis fugax.      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  One month ago, he experienced an episode of right eye blindness which was transient.  He felt like a great curtain was coming across the entire upper  and lower visual field.  This lasted a few minutes.  He had no concomitant motor or sensory deficits associated with this.  Symptoms resolved entirely.  He subsequently had an MRA of his head and neck, which revealed absence of a stroke and a 50-70% stenosed right internal carotid artery.  Carotid duplex was undertaken, which revealed a right ICA PSV/EDV of 263/116 cm per second, with a right internal common peak systolic ratio of 3.75.  This indicates a greater than 70% ZEENAT stenosis.  After having an informed discussion with the patient, he elected to proceed to operative repair today electively with Dr. Ramos.  Our input was sought regarding the above.      REVIEW OF SYSTEMS:  He currently denies chest pain, shortness of breath, nausea, vomiting, diarrhea or other neurologic symptoms.  The remainder of his 14-point review of systems within normal limits.      PAST MEDICAL HISTORY:   1.  Right eye amaurosis fugax.   2.  Hypertension.   3.  Hyperlipidemia.   4.  Longstanding tobacco abuse.      PREVIOUS SURGICAL HISTORY:   1.  Colonoscopy, 05/19/2017.   2.  Ganglion excision, left wrist, 12/23/2014.   3.  Tonsillectomy in 1967.   4.  Unknown back surgery, date and details unknown.   5.  Lumbar epidural steroid injection, 2009.      MEDICATIONS PRIOR TO ADMISSION:   1.  Tylenol p.r.n.   2.  Aspirin 325 mg daily.   3.  Lipitor 40 mg daily.   4.  Ibuprofen 800 mg t.i.d. p.r.n.   5.  Transdermal nicotine 21 mg topically daily.   6.  Metoprolol XL 25 mg daily.      ALLERGIES:  NO KNOWN DRUG ALLERGIES.  HE HAS A HYMENOPTERA ALLERGY OF ANAPHYLAXIS AND HE IS ALLERGIC TO APPLES, ALSO ANAPHYLAXIS.      SOCIAL HISTORY:  The patient quit smoking last week when he started using nicotine patches.  He is employed as a wood worker.  He is .      FAMILY HISTORY:  Notable for hypertension and hyperlipidemia in his father, hypertension in brother.      PHYSICAL EXAMINATION:   VITAL SIGNS:  Blood pressure is 142/91, pulse is  66 and regular, temperature is 98.2 degrees Fahrenheit, respiratory rate is 20, pulse ox 100% on room air.   HEENT:  Oropharynx within normal limits.   NECK:  No JVD, thyromegaly, lymphadenopathy.  Positive right carotid bruit.   LUNGS:  Clear to auscultation bilaterally without rales, wheezes or rhonchi.   HEART:  Regular rate and rhythm, normal S1, S2, no S3, S4, murmur, gallop or rub.   ABDOMEN:  Normoactive bowel sounds, soft, nondistended, nontender.   EXTREMITIES:  Without cyanosis, clubbing or edema.   NEUROLOGIC:  Nonfocal.   DERMATOLOGIC:  Reveals no suspicious lumps or masses.      LABORATORY DATA:  Reveals a lipid panel with an LDL of 110 on 2019.  Hemoglobin A1c is 5.1.  Basic metabolic panel reveals creatinine of 0.96.  Normal electrolytes, fasting glucose 102.      ASSESSMENT AND PLAN:  Please see recommendations as above.         JOHN RENDON MD             D: 2019   T: 2019   MT: FRANCK      Name:     WINTER CONNELL   MRN:      [id number]        Account:       [id number]   :      1960           Consult Date:  2019      Document: [id number]       cc: Toi Wooten MD

## 2019-02-26 NOTE — BRIEF OP NOTE
[facility]    Brief Operative Note    Pre-operative diagnosis: SYMPTOMATIC RIGHT CAROTID STENOSIS  Post-operative diagnosis * No post-op diagnosis entered *  Procedure: Procedure(s):  RIGHT CAROTID ENDARTERECTOMY WITH EEG AND INTRAOPERATIVE ULTRASOUND  Surgeon: Surgeon(s) and Role:     * Sae Ramos MD - Primary     * Wing Thomas MD - Resident - Assisting  Anesthesia: General   Estimated blood loss: 50 mL  Drains: Stefan-Johnsno  Specimens: * No specimens in log *  Findings:   Right carotid endarterectomy. No shunt used. No EEG changes intraop. .  Complications: None.  Implants: None.

## 2019-02-26 NOTE — PROGRESS NOTES
Admission medication history interview status for the 2/26/2019  admission is complete. See EPIC admission navigator for prior to admission medications     Medication history source reliability:Moderate    Medication history interview source(s):Patient    Medication history resources (including written lists, pill bottles, clinic record):None    Primary pharmacy. [facility]    Additional medication history information not noted on PTA med list : Md advised pt to start Lipitor but never sent in an order hence, pt has not started it.   Nicotine patch currently on- placed on today     Time spent in this activity: 40 mins    Prior to Admission medications    Medication Sig Last Dose Taking? Auth Provider   acetaminophen (TYLENOL 8 HOUR ARTHRITIS PAIN) 650 MG CR tablet Take 1,300 mg by mouth 2 times daily 2/24/2019 Yes Reported, Patient   aspirin (ASA) 325 MG EC tablet Take 325 mg by mouth daily 2/26/2019 at 0600 Yes Reported, Patient   ibuprofen (ADVIL/MOTRIN) 200 MG tablet Take 4 tablets (800 mg) by mouth every 8 hours as needed for pain (with food) 2/23/2019 at prn Yes Jesus Manuel Hernandez MD   metoprolol succinate ER (TOPROL-XL) 25 MG 24 hr tablet Take 1 tablet (25 mg) by mouth daily 2/26/2019 at 0600 Yes Sae Ramos MD   atorvastatin (LIPITOR) 40 MG tablet Take 40 mg by mouth daily Patient has not started  Reported, Patient   nicotine (NICODERM CQ) 21 MG/24HR 24 hr patch Place 1 patch onto the skin every 24 hours 2/26/2019 at 0600  Sae Ramos MD

## 2019-02-26 NOTE — ANESTHESIA PROCEDURE NOTES
ARTERIAL LINE PROCEDURE NOTE:   Pre-Procedure  Performed by Arturo Russell DO  Location: pre-op      Pre-Anesthestic Checklist: patient identified, IV checked, site marked, risks and benefits discussed, informed consent, monitors and equipment checked, pre-op evaluation and at physician/surgeon's request    Timeout  Correct Patient: Yes   Correct Procedure: Yes   Correct Site: Yes   Correct Laterality: Yes   Correct Position: Yes   Site Marked: Yes, N/A   .   Procedure Documentation  Procedure: arterial line  ASA 3  Supine  Insertion Site:radial, left.Skin infiltrated with 2 mL of 1% lidocaine. Injection technique: Seldinger Technique and ultrasound guided  .  .  Patient Prep;all elements of maximal sterile barrier technique followed, mask, hat, sterile gown, sterile gloves, draped, hand hygiene, chlorhexidine gluconate and isopropyl alcohol    Assessment/Narrative    Catheter: 20 gauge, 1.75 in/4.5 cm quick cath (integral wire)      Tegaderm dressing used.    Arterial waveform: Yes IBP within 10% of NIBP: Yes

## 2019-02-26 NOTE — OP NOTE
Pre operative diagnosis: Symptomatic moderate right carotid stenosis with right eye amaurosis fugax.    Postoperative diagnosis: Same.    Procedure:  1.  Right carotid endarterectomy with bovine pericardial patch angioplasty.  2.  Intraoperative EEG.  3. Intraoperative ultrasonography.    Surgeon: Sae Ramos M.D.  Assistant: Wing Thomas M.D.    Anesthesia: General.  Complications: None.  Specimens: None.  Drains: #15 drain was placed in the wound bed.    Indication for procedure: This is a very pleasant 58-year-old gentleman who presented to us with moderate stenosis of the right internal carotid artery and enuresis fugax of the right eye.  Surgical treatment is advised for prevention of future disabling stroke.  All risks and benefits of the procedure were discussed in detail with the patient and his family.  Risks including but not limited to bleeding, infection, iatrogenic injury, need for further surgery, risk of stroke and anesthesia were discussed.  Patient and his family wish was to proceed.    Procedure details: Patient was identified and then brought to the operating room and placed in supine position.  General anesthesia was induced.  Preoperative intravenous antibiotics were administered.  The right side of the neck was prepped in a sterile surgical field was created.  Preprocedure timeout was conducted.    A generous incision was made from the mastoid process to the sternal notch overlying the anterior border of the sternocleidomastoid muscle.  The platysma muscle was divided with electrocautery.  The sternocleidomastoid muscle was retracted laterally.  The carotid sheath was identified and incised.  The right internal jugular vein was identified and retracted laterally.  Common facial vein was ligated with silk suture and divided.  The vagus nerve was identified and fully protected.  The right common carotid artery was dissected out and placed in [place].  6000 units of heparin was given at this  time.  Continuing cephalad the external carotid artery and the superior thyroid artery was dissected out and placed in [place].  In the mid internal carotid artery was dissected out as well.  Mean arterial pressure was raised 210 mmHg.  The distal right internal carotid artery was clamped followed by the common carotid artery, superior thyroid and external carotid arteries.  After the internal carotid artery was clamped the carotid bulb was dissected free.  There were no EEG changes.  A #11 blade was used to make an anterior linear arteriotomy into the common carotid artery.  The arteriotomy was extended further into the internal carotid artery beyond the area of the stenosis.  There was soft moderate degree plaque that was endarterectomized.  There was a good taper point in the internal carotid artery.  The plaque was divided sharply in the common carotid artery.  The external carotid artery was everted and endarterectomized.  Loose tissue debris from the arterial lumen was carefully and meticulously removed.    The arteriotomy was closed with a bovine pericardial patch.  In the internal carotid artery the patch was sutured with 6-0 Prolene and in the common carotid artery the suture line was started with 5-0 Prolene.  Prior to completion of the suture line the internal carotid artery was unclamped, there was good backbleeding, it was reclamped.  Then the superior thyroid and external carotid arteries were unclamped, there was good backbleeding, they were reclamped.  In the common carotid artery was unclamped, there was excellent inflow, it was reclamped.  The arterial lumen was washed out with copious amounts of heparinized saline solution.  The suture line was completed.  Mean arterial pressure was brought down to 90 mmHg.  The common carotid arteries were unclamped followed by the external and superior thyroid arteries and a minute later the internal carotid artery was unclamped.  There were no EEG changes  throughout the procedure.  There was adequate hemostasis along the suture line.    Ultrasonography was performed and there was no hemodynamically significant stenosis in the common carotid, internal carotid, internal carotid beyond the patch or the external carotid arteries.  On B-mode axial imaging there was no intimal flaps identified.  Procedure was concluded.  Heparin was not reversed.  A separate stab incision was made and a #15 drain was placed in the wound bed.    Platysma layer was approximated with running 3-0 PDS.  Skin was approximated with running 4-0 Monocryl.  Dermabond was applied to the skin.  Drain was secured with nylon suture.    Counts of instruments, sponges and needles were noted to be correct.    Patient was awakened and extubated and examined by myself.  He was able to move both upper and lower extremities with equal 5 out of 5 power.    He was then taken to the recovery room in stable condition.  I went outside and spoke with the family and updated them on our progress.

## 2019-02-26 NOTE — ANESTHESIA POSTPROCEDURE EVALUATION
Patient: Melo Brewer    Procedure(s):  RIGHT CAROTID ENDARTERECTOMY WITH EEG AND INTRAOPERATIVE ULTRASOUND    Diagnosis:SYMPTOMATIC RIGHT CAROTID STENOSIS  Diagnosis Additional Information: No value filed.    Anesthesia Type:  General, ETT    Note:  Anesthesia Post Evaluation    Patient location during evaluation: PACU  Patient participation: Able to fully participate in evaluation  Level of consciousness: awake and alert  Pain management: adequate  Airway patency: patent  Cardiovascular status: acceptable and hemodynamically stable  Respiratory status: acceptable and unassisted  Hydration status: acceptable  PONV: none             Last vitals:  Vitals:    02/26/19 0848 02/26/19 1304   BP: (!) 142/91 133/83   Pulse: 66 63   Resp: 20 20   Temp: 36.8  C (98.2  F) 36.2  C (97.1  F)   SpO2: 100% 100%         Electronically Signed By: Arturo Russell DO  February 26, 2019  1:32 PM

## 2019-02-27 ENCOUNTER — APPOINTMENT (OUTPATIENT)
Dept: PHYSICAL THERAPY | Facility: CLINIC | Age: 59
End: 2019-02-27
Attending: STUDENT IN AN ORGANIZED HEALTH CARE EDUCATION/TRAINING PROGRAM
Payer: COMMERCIAL

## 2019-02-27 VITALS
SYSTOLIC BLOOD PRESSURE: 99 MMHG | OXYGEN SATURATION: 97 % | HEART RATE: 67 BPM | RESPIRATION RATE: 16 BRPM | TEMPERATURE: 98.6 F | DIASTOLIC BLOOD PRESSURE: 60 MMHG | WEIGHT: 149.25 LBS | BODY MASS INDEX: 24.09 KG/M2

## 2019-02-27 PROBLEM — Z71.89 ADVANCED DIRECTIVES, COUNSELING/DISCUSSION: Status: RESOLVED | Noted: 2017-03-29 | Resolved: 2019-02-27

## 2019-02-27 LAB
ANION GAP SERPL CALCULATED.3IONS-SCNC: 5 MMOL/L (ref 3–14)
BASOPHILS # BLD AUTO: 0 10E9/L (ref 0–0.2)
BASOPHILS NFR BLD AUTO: 0.2 %
BUN SERPL-MCNC: 22 MG/DL (ref 7–30)
CALCIUM SERPL-MCNC: 9.2 MG/DL (ref 8.5–10.1)
CHLORIDE SERPL-SCNC: 107 MMOL/L (ref 94–109)
CO2 SERPL-SCNC: 28 MMOL/L (ref 20–32)
CREAT SERPL-MCNC: 0.82 MG/DL (ref 0.66–1.25)
DIFFERENTIAL METHOD BLD: ABNORMAL
EOSINOPHIL # BLD AUTO: 0.2 10E9/L (ref 0–0.7)
EOSINOPHIL NFR BLD AUTO: 1.3 %
ERYTHROCYTE [DISTWIDTH] IN BLOOD BY AUTOMATED COUNT: 13.1 % (ref 10–15)
GFR SERPL CREATININE-BSD FRML MDRD: >90 ML/MIN/{1.73_M2}
GLUCOSE SERPL-MCNC: 95 MG/DL (ref 70–99)
HCT VFR BLD AUTO: 40.3 % (ref 40–53)
HGB BLD-MCNC: 13.3 G/DL (ref 13.3–17.7)
IMM GRANULOCYTES # BLD: 0 10E9/L (ref 0–0.4)
IMM GRANULOCYTES NFR BLD: 0.2 %
LYMPHOCYTES # BLD AUTO: 2.7 10E9/L (ref 0.8–5.3)
LYMPHOCYTES NFR BLD AUTO: 21.1 %
MCH RBC QN AUTO: 32 PG (ref 26.5–33)
MCHC RBC AUTO-ENTMCNC: 33 G/DL (ref 31.5–36.5)
MCV RBC AUTO: 97 FL (ref 78–100)
MONOCYTES # BLD AUTO: 0.9 10E9/L (ref 0–1.3)
MONOCYTES NFR BLD AUTO: 7.4 %
NEUTROPHILS # BLD AUTO: 8.8 10E9/L (ref 1.6–8.3)
NEUTROPHILS NFR BLD AUTO: 69.8 %
NRBC # BLD AUTO: 0 10*3/UL
NRBC BLD AUTO-RTO: 0 /100
PLATELET # BLD AUTO: 236 10E9/L (ref 150–450)
POTASSIUM SERPL-SCNC: 4 MMOL/L (ref 3.4–5.3)
RBC # BLD AUTO: 4.16 10E12/L (ref 4.4–5.9)
SODIUM SERPL-SCNC: 140 MMOL/L (ref 133–144)
WBC # BLD AUTO: 12.8 10E9/L (ref 4–11)

## 2019-02-27 PROCEDURE — 25000132 ZZH RX MED GY IP 250 OP 250 PS 637: Performed by: INTERNAL MEDICINE

## 2019-02-27 PROCEDURE — 85025 COMPLETE CBC W/AUTO DIFF WBC: CPT | Performed by: INTERNAL MEDICINE

## 2019-02-27 PROCEDURE — 25000132 ZZH RX MED GY IP 250 OP 250 PS 637: Performed by: SURGERY

## 2019-02-27 PROCEDURE — 80048 BASIC METABOLIC PNL TOTAL CA: CPT | Performed by: INTERNAL MEDICINE

## 2019-02-27 PROCEDURE — 36415 COLL VENOUS BLD VENIPUNCTURE: CPT | Performed by: INTERNAL MEDICINE

## 2019-02-27 PROCEDURE — 25000132 ZZH RX MED GY IP 250 OP 250 PS 637: Performed by: STUDENT IN AN ORGANIZED HEALTH CARE EDUCATION/TRAINING PROGRAM

## 2019-02-27 PROCEDURE — 99233 SBSQ HOSP IP/OBS HIGH 50: CPT | Performed by: INTERNAL MEDICINE

## 2019-02-27 PROCEDURE — 25000128 H RX IP 250 OP 636: Performed by: STUDENT IN AN ORGANIZED HEALTH CARE EDUCATION/TRAINING PROGRAM

## 2019-02-27 PROCEDURE — 99406 BEHAV CHNG SMOKING 3-10 MIN: CPT | Performed by: PHYSICAL THERAPIST

## 2019-02-27 RX ORDER — ASPIRIN 81 MG/1
81 TABLET ORAL DAILY
Qty: 90 TABLET | Refills: 1 | Status: SHIPPED | OUTPATIENT
Start: 2019-02-27 | End: 2019-08-09

## 2019-02-27 RX ORDER — LISINOPRIL 10 MG/1
10 TABLET ORAL DAILY
Qty: 90 TABLET | Refills: 1 | Status: SHIPPED | OUTPATIENT
Start: 2019-02-28 | End: 2019-08-09

## 2019-02-27 RX ORDER — CLOPIDOGREL BISULFATE 75 MG/1
75 TABLET ORAL DAILY
Qty: 30 TABLET | Refills: 3 | Status: SHIPPED | OUTPATIENT
Start: 2019-02-27 | End: 2019-06-11

## 2019-02-27 RX ORDER — OXYCODONE HYDROCHLORIDE 5 MG/1
5-10 TABLET ORAL
Qty: 20 TABLET | Refills: 0 | Status: SHIPPED | OUTPATIENT
Start: 2019-02-27 | End: 2019-05-20

## 2019-02-27 RX ADMIN — ENOXAPARIN SODIUM 40 MG: 40 INJECTION SUBCUTANEOUS at 07:13

## 2019-02-27 RX ADMIN — METOPROLOL SUCCINATE 25 MG: 25 TABLET, EXTENDED RELEASE ORAL at 09:10

## 2019-02-27 RX ADMIN — CLOPIDOGREL BISULFATE 75 MG: 75 TABLET, FILM COATED ORAL at 09:10

## 2019-02-27 RX ADMIN — OXYCODONE HYDROCHLORIDE 5 MG: 5 TABLET ORAL at 00:14

## 2019-02-27 RX ADMIN — ASPIRIN 81 MG 81 MG: 81 TABLET ORAL at 09:10

## 2019-02-27 RX ADMIN — ACETAMINOPHEN 975 MG: 325 TABLET, FILM COATED ORAL at 06:22

## 2019-02-27 RX ADMIN — ATORVASTATIN CALCIUM 40 MG: 40 TABLET, FILM COATED ORAL at 09:10

## 2019-02-27 RX ADMIN — NICOTINE 1 PATCH: 21 PATCH, EXTENDED RELEASE TRANSDERMAL at 06:21

## 2019-02-27 RX ADMIN — LISINOPRIL 10 MG: 10 TABLET ORAL at 09:10

## 2019-02-27 ASSESSMENT — ACTIVITIES OF DAILY LIVING (ADL)
ADLS_ACUITY_SCORE: 35

## 2019-02-27 NOTE — PROGRESS NOTES
Vascular Surgery Progress Note  2/27/2019     S: Doing well. Had a good night. Tolerated diet this morning and has been up walking around independently. Pain is minimal.   O: /66   Pulse 67   Temp 97.6  F (36.4  C) (Oral)   Resp 16   Wt 67.7 kg (149 lb 4 oz)   SpO2 98%   BMI 24.09 kg/m       Gen: adult male alert, oriented, conversant without distress  Neuro: No focal deficits.   Incision: dressing and incision c/d/i.   Drain in place, minimal output.      Intake/Output Summary (Last 24 hours) at 2/27/2019 0737  Last data filed at 2/27/2019 0600  Gross per 24 hour   Intake 1740 ml   Output 661 ml   Net 1079 ml     Labs reviewed    A/P: Melo Brewer is a 58 year old male POD1 from right CEA   - cont ASA and Plavix    - will remove drain today   - regular diet. Up ad kasie   - pain control with oral meds   - will plan for discharge later today.     Wing Thomas MD  Surgery Resident, PGY4  Pager: [phone]

## 2019-02-27 NOTE — PROGRESS NOTES
"   02/27/19 1418   General Information   Patient is receptive to smoking cessation at this time Yes   Packs Per Day 1 PPD   Years Smoked (#) 42 yrs   Cigarette Pack Years 42   Stage of Behavior Change   Patient's Stage of Behavior Change Preparation \"I will quit (within the next 30 days\"   Processes of Change   The following interventions were used (smoking cessation) Increase awareness of effects of smoking on health;Increase awareness of how smoking affects others;Develop strategies to increase confidence to quit;Problem-solve barriers to quitting;Identify healthy alternatives;Identify support system;Recognize rewards of value to him/her   Motivation to Quit Scale (1-10) 10   Confidence to Quit Scale (1-10) 9  (\"Well, I guess, nothing is ever for sure.\")   Education/Recommendations   Education QUIT PLAN phone line;[place] Quit Lines phone follow up program;Other (Comment)  (Discussed triggers, identifies alternatives)   Total Evaluation Time   Total Evaluation Time (Minutes) 10   Pt reports he has quit in the past. Inially states he is 100% sure he will quit. As conversation continues pt states \"Well, I'm sure I can try, but nothing is ever guaranteed.\" Fells the patch is working for him. Receptive to education provided including recommendations for removing triggers such as the smell: detailing the car, removing ashtrays, taking his break at work in a different space. Edu pt to be sure to stay open and honest with progress as there are multiple alternatives he can discuss with his MD.  "

## 2019-02-27 NOTE — PLAN OF CARE
Pt progressing well per plan of care. AVSS, neuro's intact. Incision CDI. Tolerating oral intake. Voiding. Good pain control with tylenol and oxycodone.

## 2019-02-27 NOTE — PROGRESS NOTES
[facility]    Vascular Medicine Progress Note    Attestation: I have examined the patient independently of Rylee Rockwell PA-C and agree with the examination and plan as delineated below.    Gabriel Patterson MD      Date of Service (when I saw the patient): 02/27/2019     Assessment & Plan   1.  Symptomatic right carotid artery stenosis s/p right carotid endarterectomy 2/26/19.       Assessment: Doing well. Pain controlled. Neurologically intact.   Plan: Discharge to home today. ASA/Plavix - decrease 325 mg aspirin to 81 mg. Follow up with Dr. Ramos in 2 weeks.      2.  Hyperlipidemia with LDL goal of less than 70.       Assessment:  on no statin.   Plan: Lipitor 40 mg daily. Repeat lipid panel in 3 months through PCP.  If LDL greater than 70 at that time, consider addition of Zetia or transition to Crestor 40 mg daily based upon the level.      3.  Hypertension with systolic blood pressure goal of less than 130 in a patient with vascular disease, currently being treated with a beta blocker.       Assessment: BP still up with newly added metoprolol. HR 60, so added Lisinopril 10 mg daily.   Plan: Continue both Metoprolol and Lisinopril upon discharge. Have blood pressure rechecked in 2 weeks through PCP.     Interval History   Throat dry. Eating. Ambulating. Voiding. Pain controlled. No complaints.     Physical Exam   Temp: 97.9  F (36.6  C) Temp src: Oral BP: 104/55 Pulse: 67 Heart Rate: 73 Resp: 16 SpO2: 95 % O2 Device: None (Room air) Oxygen Delivery: 2 LPM  Vitals:    02/27/19 0600   Weight: 67.7 kg (149 lb 4 oz)     Vital Signs with Ranges  Temp:  [97.1  F (36.2  C)-98.3  F (36.8  C)] 97.9  F (36.6  C)  Pulse:  [59-73] 67  Heart Rate:  [58-75] 73  Resp:  [11-47] 16  BP: ()/(55-90) 104/55  MAP:  [79 mmHg-99 mmHg] 79 mmHg  Arterial Line BP: (119-141)/(58-72) 119/58  SpO2:  [94 %-100 %] 95 %  I/O last 3 completed shifts:  In: 1740 [P.O.:240; I.V.:1500]  Out: 661 [Urine:550;  Drains:61; Blood:50]    Constitutional: Awake, alert, cooperative, no apparent distress, and appears stated age.  Eyes: Lids and lashes normal, sclera clear, conjunctiva normal.  ENT: Normocephalic, without obvious abnormality, atraumatic, oral pharynx with moist mucus membranes  Respiratory: No increased work of breathing, good air exchange, clear to auscultation bilaterally, no crackles or wheezing.  Cardiovascular: Regular rate and rhythm, normal S1 and S2, no S3 or S4, and no murmur noted.  GI: Normal bowel sounds, soft, non-distended, non-tender  Skin: No bruising or bleeding, normal skin color, texture, turgor, no redness, warmth, or swelling, no rashes. Drain removed. Surgical incision right neck c/d/i.   Musculoskeletal: There is no redness, warmth, or swelling of the joints.    Neurologic: Awake, alert, oriented to name, place and time.  Cranial nerves II-XII are grossly intact.    Neuropsychiatric: Calm, normal eye contact, alert, normal affect, oriented to self, place, time and situation, memory for past and recent events intact and thought process normal.    Medications       acetaminophen  975 mg Oral Q8H     aspirin  81 mg Oral Daily     atorvastatin  40 mg Oral Daily     clopidogrel  75 mg Oral Daily     enoxaparin  40 mg Subcutaneous Q24H     lisinopril  10 mg Oral Daily     metoprolol succinate ER  25 mg Oral Daily     nicotine  1 patch Transdermal Q24H     nicotine   Transdermal Q8H     nicotine   Transdermal Daily     sodium chloride (PF)  3 mL Intracatheter Q8H       Data   Recent Labs   Lab 02/27/19  0726 02/26/19  1739 02/26/19  0856   WBC 12.8*  --   --    HGB 13.3  --   --    MCV 97  --   --     236  --      --  140   POTASSIUM 4.0  --  3.9   CHLORIDE 107  --  107   CO2 28  --  26   BUN 22  --  20   CR 0.82 0.77 0.96   ANIONGAP 5  --  7   MARIA G 9.2  --  9.2   GLC 95  --  102*

## 2019-02-27 NOTE — DISCHARGE INSTRUCTIONS
Carotid Endartectomy  Post-Operative Instructions    Typical length of stay in the hospital is 1-2 days.  On occasion, an evening in the Intensive Care Unit may be required for blood pressure regulation.    Activity    Most people are able to resume normal activities 1-2 weeks after surgery.  The key is to gradually increase your level of activity as you are able.  It is not uncommon to feel easily fatigued - this will improve with time.      You should avoid heavy lifting more than 30 lbs for 1 week.      You may return to work when you feel able - typically 1-2 weeks after surgery, depending on the type of work you do.      You should not drive a car for 1 week after surgery.  In addition,  you can not be taking prescription strength pain medication and you must feel strong enough to drive safely.    Incision Care    You can shower or bathe 2 days after surgery - avoid rubbing and soaking your incision.      You may have strips of white tape called  steri-strips  across your incision; they should stay on for 5-7 days or until the ends start to curl up.  You can then remove the steri-strips, or we will remove them at your post-operative appointment.      Avoid shaving directly over the incision for 2-3 weeks.    Common Concerns    You may notice mild skin numbness on the side of your surgery in your neck, chin, face, and earlobe.  This is due to nerve  irritation  and will gradually resolve over the next several months.  Call our office if you experience any short-lasting episode of numbness or weakness affecting one side of your body.      Some bruising and firmness around you incision can be expected.  This will soften and resolve over the next few weeks.  You may notice that some discoloration spreads to an area lower than the incision, such as the shoulder, neck or upper chest.  Likewise, swelling can occur near the incision or below the chin.  Call our office if the swelling or bruising worsens, or if you have  difficulty swallowing.    Diet    You may resume a regular diet before you leave the hospital.  You may find that it is best to try smaller, more frequent meals until your appetite returns.    Medications    You may be started on a blood thinner after surgery - typically Aspirin and / or Plavix.      You may be given a prescription for pain medication after surgery.  If you need to have this refilled, please call your pharmacy where you had it filled.  They will then call us for approval.  Please do not call after hours for a refill on pain medication.    Post-Operative Appointments    You need to see your surgeon in the clinic approximately 1-2 weeks after surgery.  Post-operative appointment:   Date: _____________________________  Time: ____________________________  Dr. ______________________________      You will have an ultrasound test and evaluation with your surgeon 90 days (3 months) after surgery.      We will notify you by mail when you are due to schedule further ultrasound testing and evaluation; typically this is done annually.     When You Should Call Our Office    Body aches, chills or temperature greater than 101      Incision redness or drainage      Loss of vision in one eye      Loss of strength / weakness in one side of your body      Severe headache      Difficulty with speech      If you will be having an invasive procedure, such as a colonoscopy or dental work within one year of your surgery, you may need to take an antibiotic prior to the procedure if you had a Dacron patch with your surgery; please call us so we can assist you with this.    Call our main number, [phone], for assistance with any concerns or questions.     Revised 5/2014

## 2019-02-27 NOTE — PLAN OF CARE
A/O x4. AVSS on RA. Up w/ SBA. Pain managed w/ scheduled tylenol and PRN oxycodone. Neuros intact. R neck, liquid bandage, ecchymosis. TRAVIS x1, bulb to suction, 16ml of dark red output. +BS, +flatus.Tolerating regular diet. Voiding.

## 2019-02-28 ENCOUNTER — TELEPHONE (OUTPATIENT)
Dept: OTHER | Facility: CLINIC | Age: 59
End: 2019-02-28

## 2019-02-28 LAB — INTERPRETATION ECG - MUSE: NORMAL

## 2019-02-28 NOTE — TELEPHONE ENCOUNTER
Pt is s/p right carotid endarterectomy on 2/26/19 with Dr. Ramos.     Pt called to report his voice is still raspy (as demonstrated during phone conversation), coughing up brown phlem. Denies blood in sputum/fever/ nausea/vomitting.   Pt notes a little tender with swallowing but no difficulty swallowing, able to eat and drink fluids. Incision intact, no redness, no drainage.     I discussed with Dr. Ramos.     I called pt back to explain phlem and cough due to recent smoking cessation, raspy voice will clear up with time. If pt has increased pain, throat swelling, difficulty swallowing, fever to get to the ER right away. Pt notes understanding.     Chapis Lang, SITAN, RN

## 2019-03-07 NOTE — DISCHARGE SUMMARY
Mr. Rey was admitted and treated for symptomatic right carotid stenosis. He underwent right carotid endarterectomy on 26 May 2019.  Postoperative course was unremarkable.  His neuro exam was intact.  His blood pressure and vitals were stable.  He is being discharged in stable condition with aspirin and Plavix and his previous medications.  I continue to advise tobacco cessation.  He will be seen in clinic in 2 weeks for follow-up.

## 2019-03-11 ENCOUNTER — TELEPHONE (OUTPATIENT)
Dept: OTHER | Facility: CLINIC | Age: 59
End: 2019-03-11

## 2019-03-11 ENCOUNTER — OFFICE VISIT (OUTPATIENT)
Dept: OTHER | Facility: CLINIC | Age: 59
End: 2019-03-11
Attending: SURGERY
Payer: COMMERCIAL

## 2019-03-11 VITALS
RESPIRATION RATE: 16 BRPM | WEIGHT: 149 LBS | SYSTOLIC BLOOD PRESSURE: 153 MMHG | BODY MASS INDEX: 23.95 KG/M2 | HEART RATE: 68 BPM | HEIGHT: 66 IN | DIASTOLIC BLOOD PRESSURE: 89 MMHG

## 2019-03-11 DIAGNOSIS — I77.9 CAROTID ARTERY DISEASE (H): Primary | ICD-10-CM

## 2019-03-11 DIAGNOSIS — I65.21 CAROTID STENOSIS, SYMPTOMATIC W/O INFARCT, RIGHT: Primary | ICD-10-CM

## 2019-03-11 PROCEDURE — G0463 HOSPITAL OUTPT CLINIC VISIT: HCPCS

## 2019-03-11 PROCEDURE — 99024 POSTOP FOLLOW-UP VISIT: CPT | Mod: ZP | Performed by: SURGERY

## 2019-03-11 ASSESSMENT — MIFFLIN-ST. JEOR: SCORE: 1443.61

## 2019-03-11 NOTE — PROGRESS NOTES
Melo underwent right carotid endarterectomy for severe symptomatic disease on the 26th of 2019.  He has no complaints.  He has not had any recurrent amaurosis fugax or transient ischemic attacks.    On examination:  He appears comfortable and is in no acute distress.  Blood pressure in the right arm is 153 x 89.  We repeated it and it was 152 x 89.    Right neck incision is healing well.  There is no hematoma.  Tongue is midline.  He is moving both upper and lower extremities with equal 5 out of 5 power.    He has had a satisfactory recovery.  However, I am worried about his blood pressure.  I have asked him to purchase a home blood pressure monitoring apparatus.  If the blood pressure is consistently above 140/90 mmHg at home then we will increase his lisinopril to 20 mg daily.  He will call us next week and update us on his blood pressure.  He can return to work unrestricted in 2 weeks.    We will see him back in 3 months with right carotid duplex sonography.

## 2019-03-11 NOTE — PROGRESS NOTES
"Melo Brewer is a 58 year old male who presents for:  Chief Complaint   Patient presents with     RECHECK     1st PO. Right CEA with bovine pericardial patch angioplasty on 2-26-19.        Vitals:    Vitals:    03/11/19 0830   BP: 153/89   BP Location: Left arm   Patient Position: Chair   Cuff Size: Adult Regular   Pulse: 68   Resp: 16   Weight: 149 lb (67.6 kg)   Height: 5' 6\" (1.676 m)       BMI:  Estimated body mass index is 24.05 kg/m  as calculated from the following:    Height as of this encounter: 5' 6\" (1.676 m).    Weight as of this encounter: 149 lb (67.6 kg).    Pain Score:  Data Unavailable        Dandy Yates    "

## 2019-03-28 ENCOUNTER — TELEPHONE (OUTPATIENT)
Dept: FAMILY MEDICINE | Facility: OTHER | Age: 59
End: 2019-03-28

## 2019-03-28 NOTE — LETTER
[facility]  [address] [facility] [id number] [phone]       March 28, 2019    Melo Brewer  [address] [facility] [address]    Dear Melo,    We care about your health and have reviewed your health plan and are making recommendations based on this review, to optimize your health.    You are in particular need of attention regarding:  -Blood Pressure (your goal is <140/90) BP Readings from Last 2 Encounters:   03/11/19 153/89   02/27/19 99/60        We are recommending that you:  -schedule a BLOOD PRESSURE CHECK within the next 1-4 weeks    In addition, here is a list of due or overdue Health Maintenance reminders.    Health Maintenance Due   Topic Date Due     HIV SCREEN (SYSTEM ASSIGNED)  05/17/1979     Hepatitis C Screening  05/17/1979     Zoster (Shingles) Vaccine (1 of 2) 05/17/2011     Preventive Care Visit  03/29/2018     Flu Vaccine (1) 09/01/2018       To address the above recommendations, we encourage you to contact us at [phone], via [employer] or by contacting Central Scheduling toll free at [phone] 24 hours a day. They will assist you with finding the most convenient time and location.    Thank you for trusting [facility] and we appreciate the opportunity to serve you.  We look forward to supporting your healthcare needs in the future.    Healthy Regards,    Your [facility] Team

## 2019-03-28 NOTE — TELEPHONE ENCOUNTER
Panel Management Review      Patient has the following on his problem list: None      Composite cancer screening  Chart review shows that this patient is due/due soon for the following None  Summary:    Patient is due/failing the following:   BP CHECK    Action needed:   Patient needs nurse only appointment.    Type of outreach:    Sent letter.    Questions for provider review:    None                                                                                                                                    Demetrice Gregory MA     3/28/2019       Chart routed to  .

## 2019-04-05 DIAGNOSIS — I65.21 SYMPTOMATIC CAROTID ARTERY STENOSIS, RIGHT: ICD-10-CM

## 2019-04-08 RX ORDER — NICOTINE 14 MG/24H
PATCH, EXTENDED RELEASE TRANSDERMAL
OUTPATIENT
Start: 2019-04-08

## 2019-04-08 NOTE — TELEPHONE ENCOUNTER
"Requested Prescriptions   Pending Prescriptions Disp Refills     SM NICOTINE 14 MG/24HR 24 hr patch [Pharmacy Med Name: SM NICOT TRANS SYS 14MG 14]       Sig: PLACE 1 PATCH ONTO THE SKIN EVERY 24 HOURS   Last Written Prescription Date:  2/25/19  Last Fill Quantity: 90,  # refills: 3   Last office visit: No previous visit found with prescribing provider:     Future Office Visit:   Next 5 appointments (look out 90 days)    Sergio 10, 2019 10:30 AM CDT  Return Visit with Sae Ramos MD  [facility] ([facility] [address]  [place] [address]  [phone]             Partial Cholinergic Nicotinic Agonist Agents Failed - 4/5/2019  2:18 PM        Failed - Blood pressure under 140/90 in past 12 months     BP Readings from Last 3 Encounters:   03/11/19 153/89   02/27/19 99/60   02/25/19 140/86           Passed - Recent (12 mo) or future (30 days) visit within the authorizing provider's specialty     Patient had office visit in the last 12 months or has a visit in the next 30 days with authorizing provider or within the authorizing provider's specialty.  See \"Patient Info\" tab in inbasket, or \"Choose Columns\" in Meds & Orders section of the refill encounter.              Passed - Medication is active on med list        Passed - Patient is 18 years of age or older        DENIED   REFILLS CURRENT    Leslie Cardona RN    "

## 2019-04-13 ENCOUNTER — NURSE TRIAGE (OUTPATIENT)
Dept: NURSING | Facility: CLINIC | Age: 59
End: 2019-04-13

## 2019-04-14 NOTE — TELEPHONE ENCOUNTER
Maria A (wife) calls and says that pt's BP = 107/63 and pulse = 55. Wife says that these reading are low for pt. Wife says that pt. Feels cold and clammy and says that his feet hurt. Pt. Is unsteady. Pt. Vomited and has not been eating. Wife says that 6 weeks ago, pt. Had Carotid Surgery. Wife says that she is going to take pt. To the ER and is not going to call 911.    Reason for Disposition    Difficult to awaken or acting confused  (e.g., disoriented, slurred speech)    Additional Information    Negative: Severe difficulty breathing (e.g., struggling for each breath, speaks in single words)    Negative: Shock suspected (e.g., cold/pale/clammy skin, too weak to stand, low BP, rapid pulse)    Protocols used: WEAKNESS (GENERALIZED) AND FATIGUE-ADULT-AH

## 2019-04-16 DIAGNOSIS — Z72.0 TOBACCO ABUSE: Primary | ICD-10-CM

## 2019-04-16 NOTE — TELEPHONE ENCOUNTER
Per pharmacy, pt would like to start the 7 mg patches. Margoth Becerra CMA ([facility])

## 2019-04-18 NOTE — TELEPHONE ENCOUNTER
Routing refill request to provider for review/approval because:  Drug not active on patient's medication list, 21 mg patch currently on medication list. Will have provider review.     SITA MohanN, RN  [facility]

## 2019-05-20 ENCOUNTER — OFFICE VISIT (OUTPATIENT)
Dept: FAMILY MEDICINE | Facility: OTHER | Age: 59
End: 2019-05-20
Payer: COMMERCIAL

## 2019-05-20 VITALS
OXYGEN SATURATION: 98 % | WEIGHT: 157.4 LBS | SYSTOLIC BLOOD PRESSURE: 133 MMHG | BODY MASS INDEX: 25.41 KG/M2 | DIASTOLIC BLOOD PRESSURE: 76 MMHG | HEART RATE: 84 BPM | TEMPERATURE: 99 F | RESPIRATION RATE: 12 BRPM

## 2019-05-20 DIAGNOSIS — I65.29 OCCLUSION OF CAROTID ARTERY, UNSPECIFIED LATERALITY: Primary | ICD-10-CM

## 2019-05-20 PROCEDURE — 99213 OFFICE O/P EST LOW 20 MIN: CPT | Performed by: INTERNAL MEDICINE

## 2019-05-20 ASSESSMENT — PAIN SCALES - GENERAL: PAINLEVEL: NO PAIN (0)

## 2019-05-20 NOTE — PROGRESS NOTES
Subjective     Melo Brewer is a 59 year old male who presents to clinic today for the following health issues:    HPI   Chief Complaint   Patient presents with     Follow Up     post surgury labs                     Chief Complaint         The patient is a pleasant 59-year-old gentleman who has a history of previous right carotid endarterectomy.  At that time he was started on statin therapy and has been taking medication without flaw.  He denies any muscle pain symptoms from the atorvastatin.  He continues to take the aspirin as well and his blood pressures have been controlled with the use of metoprolol and lisinopril.  He denies any significant fatigue or cough.  Overall, he has been doing well without any neurologic sequelae.                       PAST, FAMILY,SOCIAL HISTORY:     Medical  History:   has no past medical history on file.     Surgical History:   has a past surgical history that includes REMOVAL OF TONSILS,<11 Y/O (1967); EXCIS PRIMARY GANGLION WRIST (12/23/2004); surgical history of -  (4/24/2009); INJ EPIDURAL LUMBAR/SACRAL W/WO CONTRAST (2009); back surgery; Colonoscopy (N/A, 5/19/2017); and Endarterectomy carotid (Right, 2/26/2019).     Social History:   reports that he has quit smoking. His smoking use included cigarettes. He has a 7.50 pack-year smoking history. He has never used smokeless tobacco. He reports that he drinks alcohol. He reports that he does not use drugs.     Family History:  family history includes Hyperlipidemia in his father; Hypertension in his brother and father.            MEDICATIONS  Current Outpatient Medications   Medication Sig Dispense Refill     acetaminophen (TYLENOL 8 HOUR ARTHRITIS PAIN) 650 MG CR tablet Take 1,300 mg by mouth 2 times daily       aspirin 81 MG EC tablet Take 1 tablet (81 mg) by mouth daily 90 tablet 1     atorvastatin (LIPITOR) 40 MG tablet Take 40 mg by mouth daily       clopidogrel (PLAVIX) 75 MG tablet Take 1 tablet (75 mg) by mouth daily  30 tablet 3     ibuprofen (ADVIL/MOTRIN) 200 MG tablet Take 4 tablets (800 mg) by mouth every 8 hours as needed for pain (with food)       lisinopril (PRINIVIL/ZESTRIL) 10 MG tablet Take 1 tablet (10 mg) by mouth daily 90 tablet 1     metoprolol succinate ER (TOPROL-XL) 25 MG 24 hr tablet Take 1 tablet (25 mg) by mouth daily 90 tablet 3         --------------------------------------------------------------------------------------------------------------------                              Review of Systems       LUNGS: Pt denies: cough, excess sputum, hemoptysis, or shortness of breath.   HEART: Pt denies: chest pain, arrhythmia, syncope, tachy or bradyarrhythmia.   GI: Pt denies: nausea, vomiting, diarrhea, constipation, melena, or hematochezia.   NEURO: Pt denies: seizures, strokes, diplopia, weakness, paraesthesias, or paralysis.   SKIN: Pt denies: itching, rashes, discoloration, or specific lesions of concern. Denies recent hair loss.   PSYCH: The patient denies significant depression, anxiety, mood imbalance. Specifically denies any suicidal ideation.                                     Examination      /76 (BP Location: Left arm, Patient Position: Sitting, Cuff Size: Adult Regular)   Pulse 84   Temp 99  F (37.2  C) (Temporal)   Resp 12   Wt 71.4 kg (157 lb 6.4 oz)   SpO2 98%   BMI 25.41 kg/m     Constitutional: The patient appears to be in no acute distress. The patient appears to be adequately hydrated. No acute respiratory or hemodynamic distress is noted at this time.     LUNGS: clear bilaterally, airflow is brisk, no intercostal retraction or stridor is noted. No coughing is noted during visit.   HEART:  regular without rubs, clicks, gallops, or murmurs. PMI is nondisplaced. Upstrokes are brisk. S1,S2 are heard.   GI: Abdomen is soft, without rebound, guarding or tenderness. Bowel sounds are appropriate. No renal bruits are heard.   NEURO: Pt is alert and appropriate. No neurologic  lateralization is noted. Cranial nerves 2-12 are intact. Peripheral sensory and motor function are grossly normal.    SKIN:  warm and dry. No erythema, or rashes are noted. No specific lesions of concern are noted.    PSYCH: The patient appears grossly appropriate. Maintains good eye contact, does not have any jittery or atypical motion. Displays appropriate affect.   ENT: Pharynx is non-erythemous, minimal PND, no significant nasal obstruction, TM's not red or retracted, hearing intact bilaterally. No carotid bruits are heard. No JVD seen. Thyroid is not nodular or enlarged.  Scarring is consistent with a carotid endarterectomy on the right                                             Decision Making    1. Occlusion of carotid artery, unspecified laterality  Check lipids and liver in the morning when he is fasting.  - Lipid Profile; Future  - Comprehensive metabolic panel; Future                             FOLLOW UP   I have asked the patient to make an appointment for followup with me as predicated by his results        I have carefully explained the diagnosis and treatment options to the patient.  The patient has displayed an understanding of the above, and all subsequent questions were answered.      DO LORENZO Aguilar    Portions of this note were produced using [employer]  Although every attempt at real-time proof reading has been made, occasional grammar/syntax errors may have been missed.

## 2019-05-21 DIAGNOSIS — I65.29 OCCLUSION OF CAROTID ARTERY, UNSPECIFIED LATERALITY: ICD-10-CM

## 2019-05-21 LAB
ALBUMIN SERPL-MCNC: 4 G/DL (ref 3.4–5)
ALP SERPL-CCNC: 50 U/L (ref 40–150)
ALT SERPL W P-5'-P-CCNC: 29 U/L (ref 0–70)
ANION GAP SERPL CALCULATED.3IONS-SCNC: 5 MMOL/L (ref 3–14)
AST SERPL W P-5'-P-CCNC: 16 U/L (ref 0–45)
BILIRUB SERPL-MCNC: 0.8 MG/DL (ref 0.2–1.3)
BUN SERPL-MCNC: 24 MG/DL (ref 7–30)
CALCIUM SERPL-MCNC: 9.5 MG/DL (ref 8.5–10.1)
CHLORIDE SERPL-SCNC: 104 MMOL/L (ref 94–109)
CHOLEST SERPL-MCNC: 208 MG/DL
CO2 SERPL-SCNC: 30 MMOL/L (ref 20–32)
CREAT SERPL-MCNC: 0.92 MG/DL (ref 0.66–1.25)
GFR SERPL CREATININE-BSD FRML MDRD: >90 ML/MIN/{1.73_M2}
GLUCOSE SERPL-MCNC: 91 MG/DL (ref 70–99)
HDLC SERPL-MCNC: 70 MG/DL
LDLC SERPL CALC-MCNC: 116 MG/DL
NONHDLC SERPL-MCNC: 138 MG/DL
POTASSIUM SERPL-SCNC: 4.3 MMOL/L (ref 3.4–5.3)
PROT SERPL-MCNC: 7 G/DL (ref 6.8–8.8)
SODIUM SERPL-SCNC: 139 MMOL/L (ref 133–144)
TRIGL SERPL-MCNC: 111 MG/DL

## 2019-05-21 PROCEDURE — 80053 COMPREHEN METABOLIC PANEL: CPT | Performed by: INTERNAL MEDICINE

## 2019-05-21 PROCEDURE — 80061 LIPID PANEL: CPT | Performed by: INTERNAL MEDICINE

## 2019-05-21 PROCEDURE — 36415 COLL VENOUS BLD VENIPUNCTURE: CPT | Performed by: INTERNAL MEDICINE

## 2019-05-29 DIAGNOSIS — E78.5 HYPERLIPIDEMIA LDL GOAL <100: Primary | ICD-10-CM

## 2019-05-29 RX ORDER — ATORVASTATIN CALCIUM 40 MG/1
40 TABLET, FILM COATED ORAL DAILY
Qty: 90 TABLET | Refills: 0 | Status: SHIPPED | OUTPATIENT
Start: 2019-05-29 | End: 2020-07-06

## 2019-05-29 NOTE — TELEPHONE ENCOUNTER
Reason for Call:  Medication or medication refill:    Do you use a [facility]?  Name of the pharmacy and phone number for the current request:  [facility] [place] - [phone]    Name of the medication requested: Lipitor    Other request: not at this time    Can we leave a detailed message on this number? YES    Phone number patient can be reached at: Cell number on file:    Telephone Information:   Mobile [phone]       Best Time: any    Call taken on 5/29/2019 at 4:18 PM by Ely Al

## 2019-05-30 ENCOUNTER — TELEPHONE (OUTPATIENT)
Dept: FAMILY MEDICINE | Facility: OTHER | Age: 59
End: 2019-05-30

## 2019-05-30 NOTE — TELEPHONE ENCOUNTER
Christelle, patients wife, called back, relayed message above. Will discuss with letty and get back to clinic.        Dilcia Washington ~ [place] [address]  [email or web address]  [email or web address]  Office:  [phone]  Fax:  [phone]

## 2019-05-30 NOTE — TELEPHONE ENCOUNTER
----- Message from Toi Wang DO sent at 5/30/2019  8:59 AM CDT -----  Dear Melo, your recent test results are attached.  The chemistry panel is normal including blood sugar, kidney and liver tests.  The cholesterol is slightly elevated with LDL of 116.  Would recommend trying to limit fatty foods.      You may want to consider increasing the Lipitor to 80 mg daily.  Given your history of arterial disease, this may prove to be a good idea.    Let me know what you decide.        Feel free to contact me via the office or My Chart if you have any questions regarding the above.

## 2019-05-30 NOTE — RESULT ENCOUNTER NOTE
Dear Melo, your recent test results are attached.  The chemistry panel is normal including blood sugar, kidney and liver tests.  The cholesterol is slightly elevated with LDL of 116.  Would recommend trying to limit fatty foods.      You may want to consider increasing the Lipitor to 80 mg daily.  Given your history of arterial disease, this may prove to be a good idea.    Let me know what you decide.        Feel free to contact me via the office or My Chart if you have any questions regarding the above.

## 2019-05-30 NOTE — TELEPHONE ENCOUNTER
Unable to leave a message on cell, not patients. Unable to leave message on home phone it just kept ringing. Will try patient again later. If calls back please give results below.     Sherie Lemons MA

## 2019-06-04 DIAGNOSIS — E78.5 HYPERLIPIDEMIA LDL GOAL <100: Primary | ICD-10-CM

## 2019-06-04 RX ORDER — ATORVASTATIN CALCIUM 80 MG/1
80 TABLET, FILM COATED ORAL DAILY
Qty: 90 TABLET | Refills: 1 | Status: SHIPPED | OUTPATIENT
Start: 2019-06-04 | End: 2019-11-04

## 2019-06-04 NOTE — TELEPHONE ENCOUNTER
"[facility] sent the following message in regards to pt's Atorvastatin: \"pt states he is now taking 80 mg every day. If correct, please send current rx. Thanks.\" Margoth Becerra CMA ([facility])    "

## 2019-06-11 DIAGNOSIS — I65.21 STENOSIS OF RIGHT CAROTID ARTERY: ICD-10-CM

## 2019-06-11 RX ORDER — CLOPIDOGREL BISULFATE 75 MG/1
75 TABLET ORAL DAILY
Qty: 30 TABLET | Refills: 3 | Status: SHIPPED | OUTPATIENT
Start: 2019-06-11 | End: 2021-02-17

## 2019-07-01 ENCOUNTER — HOSPITAL ENCOUNTER (OUTPATIENT)
Dept: ULTRASOUND IMAGING | Facility: CLINIC | Age: 59
Discharge: HOME | End: 2019-07-01
Attending: SURGERY | Admitting: SURGERY
Payer: COMMERCIAL

## 2019-07-01 ENCOUNTER — OFFICE VISIT (OUTPATIENT)
Dept: OTHER | Facility: CLINIC | Age: 59
End: 2019-07-01
Attending: SURGERY
Payer: COMMERCIAL

## 2019-07-01 VITALS — DIASTOLIC BLOOD PRESSURE: 84 MMHG | HEART RATE: 55 BPM | SYSTOLIC BLOOD PRESSURE: 161 MMHG

## 2019-07-01 DIAGNOSIS — I65.21 SYMPTOMATIC CAROTID ARTERY STENOSIS, RIGHT: Primary | ICD-10-CM

## 2019-07-01 DIAGNOSIS — I77.9 CAROTID ARTERY DISEASE (H): ICD-10-CM

## 2019-07-01 PROCEDURE — G0463 HOSPITAL OUTPT CLINIC VISIT: HCPCS

## 2019-07-01 PROCEDURE — 93882 EXTRACRANIAL UNI/LTD STUDY: CPT | Mod: RT

## 2019-07-01 PROCEDURE — 99213 OFFICE O/P EST LOW 20 MIN: CPT | Mod: ZP | Performed by: SURGERY

## 2019-07-01 RX ORDER — CLOPIDOGREL BISULFATE 75 MG/1
75 TABLET ORAL DAILY
Qty: 30 TABLET | Refills: 7 | Status: SHIPPED | OUTPATIENT
Start: 2019-07-01 | End: 2020-01-28

## 2019-07-01 NOTE — PROGRESS NOTES
Mr. Brewer is a very pleasant 59 year old male who underwent right carotid endarterectomy for symptomatic disease in February 2019.  He reports some numbness in his neck anterior to the surgical incision.  Other than that he has no complaints.   The surgical incision itself is healed well.    I reviewed the duplex sonography which shows widely patent right carotid endarterectomy site.    I reassured him of the findings.     He is to continue the Plavix till March 2020 and then stop.   We will see him back in 6 months with bilateral carotid duplex sonography.

## 2019-07-01 NOTE — NURSING NOTE
"Melo Brewer is a 59 year old male who presents for:  Chief Complaint   Patient presents with     RECHECK     R carotid (11:00 V[facility]; 11:45 KMK) History of right CEA; 3 month follow up to 3-11-19 appointment with Dr. Rachel Smileys:    Vitals:    07/01/19 1047   BP: 161/84   BP Location: Right arm   Patient Position: Chair   Cuff Size: Adult Large   Pulse: 55       BMI:  Estimated body mass index is 25.41 kg/m  as calculated from the following:    Height as of 3/11/19: 5' 6\" (1.676 m).    Weight as of 5/20/19: 157 lb 6.4 oz (71.4 kg).    Pain Score:  Data Unavailable        Karly Almeida MA    "

## 2019-07-01 NOTE — LETTER
[facility] [address]  [place] [address]  Phone: [phone]  Fax: [phone]      2019       Re: Melo Brewer - 1960    Mr. Brewer is a very pleasant 59 year old male who underwent right carotid endarterectomy for symptomatic disease in 2019.  He reports some numbness in his neck anterior to the surgical incision.  Other than that he has no complaints.   The surgical incision itself is healed well.     I reviewed the duplex sonography which shows widely patent right carotid endarterectomy site.     I reassured him of the findings.      He is to continue the Plavix till 2020 and then stop.   We will see him back in 6 months with bilateral carotid duplex sonography.        JOSE ELIAS LYNCH MD

## 2019-07-02 DIAGNOSIS — I65.21 STENOSIS OF RIGHT CAROTID ARTERY: Primary | ICD-10-CM

## 2019-08-09 DIAGNOSIS — I65.29 STENOSIS OF CAROTID ARTERY, UNSPECIFIED LATERALITY: ICD-10-CM

## 2019-08-09 DIAGNOSIS — I10 BENIGN ESSENTIAL HYPERTENSION: ICD-10-CM

## 2019-08-09 RX ORDER — ASPIRIN 81 MG/1
81 TABLET ORAL DAILY
Qty: 90 TABLET | Refills: 1 | Status: SHIPPED | OUTPATIENT
Start: 2019-08-09 | End: 2020-02-03

## 2019-08-09 RX ORDER — LISINOPRIL 10 MG/1
10 TABLET ORAL DAILY
Qty: 90 TABLET | Refills: 1 | Status: SHIPPED | OUTPATIENT
Start: 2019-08-09 | End: 2020-02-03

## 2019-08-09 NOTE — TELEPHONE ENCOUNTER
"Last Written Prescription Date: 2/27/19  Last Fill Quantity: 90,  # refills: 1   Not seen in clinic.    Hospital consult from 2/27/19 for right carotid endarterectomy.    Requested Prescriptions   Pending Prescriptions Disp Refills     aspirin 81 MG EC tablet 90 tablet 1     Sig: Take 1 tablet (81 mg) by mouth daily       Analgesics (Non-Narcotic Tylenol and ASA Only) Passed - 8/9/2019  1:13 PM        Passed - Recent (12 mo) or future (30 days) visit within the authorizing provider's specialty     Patient had office visit in the last 12 months or has a visit in the next 30 days with authorizing provider or within the authorizing provider's specialty.  See \"Patient Info\" tab in inbasket, or \"Choose Columns\" in Meds & Orders section of the refill encounter.              Passed - Patient is age 20 years or older     If ASA is flagged for ages under 20 years old. Forward to provider for confirmation Ryes Syndrome is not a concern.              Passed - Medication is active on med list        Prescription approved per [facility] Refill Protocol.      "

## 2019-08-09 NOTE — TELEPHONE ENCOUNTER
Last Written Prescription Date: 2/27/19  Last Fill Quantity: 90,  # refills: 1   Not seen in clinic.     Hospital consult from 2/27/19 for right carotid endarterectomy.      Unable to fill per [facility] protocol.  Medication and pharmacy loaded.    Christina Alfred RN BSN

## 2019-09-15 ENCOUNTER — OFFICE VISIT (OUTPATIENT)
Dept: URGENT CARE | Facility: RETAIL CLINIC | Age: 59
End: 2019-09-15
Payer: COMMERCIAL

## 2019-09-15 VITALS
HEART RATE: 79 BPM | DIASTOLIC BLOOD PRESSURE: 94 MMHG | TEMPERATURE: 99.3 F | OXYGEN SATURATION: 98 % | SYSTOLIC BLOOD PRESSURE: 149 MMHG

## 2019-09-15 DIAGNOSIS — J02.9 ACUTE PHARYNGITIS, UNSPECIFIED ETIOLOGY: Primary | ICD-10-CM

## 2019-09-15 DIAGNOSIS — J01.90 ACUTE SINUSITIS WITH SYMPTOMS > 10 DAYS: ICD-10-CM

## 2019-09-15 LAB — S PYO AG THROAT QL IA.RAPID: NEGATIVE

## 2019-09-15 PROCEDURE — 87081 CULTURE SCREEN ONLY: CPT | Performed by: INTERNAL MEDICINE

## 2019-09-15 PROCEDURE — 99213 OFFICE O/P EST LOW 20 MIN: CPT | Performed by: INTERNAL MEDICINE

## 2019-09-15 PROCEDURE — 87880 STREP A ASSAY W/OPTIC: CPT | Mod: QW | Performed by: INTERNAL MEDICINE

## 2019-09-15 NOTE — PROGRESS NOTES
[facility] Progress Note        Roseann Bustamante MD, MPH  09/15/2019        History:      Melo Brewer is a pleasant 59 year old year old male with a chief complaint of nasal congestion and sinus pressure x 11 days and sore throat since 4 days ago.   No fever or chills.   No dyspnea or wheezing. No chest pain.  Used to smoke 1/2 pack per day. Currently does not smoke.   No headache or neck pain. No visual symptoms.  No GI or  symptoms.   No MSK symptoms.         Assessment and Plan:        1. Acute pharyngitis, unspecified etiology  - BETA STREP GROUP A R/O CULTURE  - RAPID STREP SCREEN: negative.  Patient was advised to use throat lozenges and gargle with salt water for symptomatic relief.  2. Acute sinusitis with symptoms > 10 days  - amoxicillin-clavulanate (AUGMENTIN) 875-125 MG tablet; Take 1 tablet by mouth 2 times daily for 10 days  Dispense: 20 tablet; Refill: 0    Discussed supportive care with the patient  Advised to increase fluid intake and rest.    Tylenol 650 mg po for pain q 6 hours as needed.  F/u w PCP in 4-5 days, earlier if symptoms worsen.   Melo to follow up with Primary Care provider regarding elevated blood pressure.             Physical Exam:      BP (!) 149/94   Pulse 79   Temp 99.3  F (37.4  C) (Tympanic)   SpO2 98%      Constitutional: Patient is in no distress The patient is pleasant and cooperative.   HEENT: Head:  Head is atraumatic, normocephalic.    Eyes: Pupils are equal, round and reactive to light and accomodation.  Sclera is non-icteric. No conjunctival injection, or exudate noted. Extraocular motion is intact. Visual acuity is intact bilaterally.  Ears:  External acoustic canals are patent and clear.  There is no erythema and bulging( exudate)  of the ( R/L ) tympanic membrane(s ).   Nose:  Nasal congestion w/o drainage or mucosal ulceration is noted. Pain upon gentle tapping of maxillary and frontal sinuses.  Throat:  Oral mucosa is moist.  No oral  lesions are noted. Posterior pharyngeal hyperemia w/o exudate noted.     Neck Supple.  There is no cervical lymphadenopathy.  No nuchal rigidity noted.  There is no meningismus.     Cardiovascular: Heart is regular to rate and rhythm.  No murmur is noted.     Lungs: Clear in the anterior and posterior pulmonary fields.   Abdomen: Soft and non-tender.    Back No flank tenderness is noted.   Extremeties No edema, no calf tenderness.   Neuro: No focal deficit.   Skin No petechiae or purpura is noted.  There is no rash.   Mood Normal              Data:      All new lab and imaging data was reviewed.   Results for orders placed or performed during the hospital encounter of 07/01/19   US Carotid Right    Narrative    PROCEDURE:  Right carotid doppler ultrasound    DATE OF PROCEDURE:   7/1/2019 10:24 AM    CLINICAL HISTORY/INDICATION:  59-year-old male with history of right carotid endarterectomy presents  for follow-up    COMPARISON:  Carotid ultrasound 2/19/2019    TECHNIQUE:  Grayscale, color-flow and spectral waveform analysis with velocities  were performed of the right carotid arteries.     FINDINGS:    Right:   Plaque: No significant plaques.    Right ICA Proximal PSV/EDV:  61/16  cm/sec.  Right ICA Mid PSV/EDV:  70/28 cm/sec.  Right ICA Dist PSV/EDV:  58/23 cm/sec.  Right ICA/CCA PSV Ratio:  1.0    These indicate less than 50% diameter stenosis of the right ICA.    Right Vertebral: antegrade flow  Right ECA: antegrade flow      Impression    IMPRESSION:    1. Less than 50% stenosis of the right internal carotid artery  relative to the normal diameter internal carotid artery.    Degree of stenosis measured relative to the diameter of the normal  internal carotid artery per NASCET or NASCET type criteria    <50% stenosis  -ICA PSV <125 cm/sec and plaque or intimal thickening is visible  sonographically.   -ICA/CCA PSV ratio >2.0 and ICA EDV < 40 cm/sec    50-69% stenosis  -ICA -230 cm/sec and plaque visible  sonographically  -ICA/CCA PSC ratio 2.0-4.0 and ICA EDV of  cm/sec    70% or greater stenosis  -ICA PSV is >230 cm/sec and visible plaque and luminal narrowing are  seen at grayscale and color doppler  -ICA/CCA PSV ratio >4 and ICA EDV >100 cm/sec    FAM SCHWARTZ MD

## 2019-09-17 LAB
BACTERIA SPEC CULT: NORMAL
SPECIMEN SOURCE: NORMAL

## 2019-11-04 DIAGNOSIS — E78.5 HYPERLIPIDEMIA LDL GOAL <100: ICD-10-CM

## 2019-11-04 RX ORDER — ATORVASTATIN CALCIUM 80 MG/1
80 TABLET, FILM COATED ORAL DAILY
Qty: 90 TABLET | Refills: 1 | Status: SHIPPED | OUTPATIENT
Start: 2019-11-04 | End: 2020-05-05

## 2019-11-04 NOTE — TELEPHONE ENCOUNTER
"Requested Prescriptions   Pending Prescriptions Disp Refills     atorvastatin (LIPITOR) 80 MG tablet [Pharmacy Med Name: ATORVASTATIN 80MG TABLET] 90 tablet 1     Sig: TAKE 1 TABLET (80 MG) BY MOUTH DAILY   Last Written Prescription Date:  5/29/19  Last Fill Quantity: 90,  # refills: 0   Last office visit: 5/20/2019 with prescribing provider:  5/20/19   Future Office Visit:        Statins Protocol Passed - 11/4/2019  1:02 AM        Passed - LDL on file in past 12 months     Recent Labs   Lab Test 05/21/19  0902   *             Passed - No abnormal creatine kinase in past 12 months     No lab results found.             Passed - Recent (12 mo) or future (30 days) visit within the authorizing provider's specialty     Patient has had an office visit with the authorizing provider or a provider within the authorizing providers department within the previous 12 mos or has a future within next 30 days. See \"Patient Info\" tab in inbasket, or \"Choose Columns\" in Meds & Orders section of the refill encounter.              Passed - Medication is active on med list        Passed - Patient is age 18 or older        "

## 2019-11-04 NOTE — TELEPHONE ENCOUNTER
Prescription approved per [facility] Refill Protocol.    SITA MohanN, RN  [facility]

## 2020-01-28 ENCOUNTER — HOSPITAL ENCOUNTER (OUTPATIENT)
Dept: ULTRASOUND IMAGING | Facility: CLINIC | Age: 60
Discharge: HOME | End: 2020-01-28
Attending: SURGERY | Admitting: SURGERY
Payer: COMMERCIAL

## 2020-01-28 ENCOUNTER — OFFICE VISIT (OUTPATIENT)
Dept: VASCULAR SURGERY | Facility: CLINIC | Age: 60
End: 2020-01-28
Attending: SURGERY
Payer: COMMERCIAL

## 2020-01-28 VITALS — HEART RATE: 57 BPM | SYSTOLIC BLOOD PRESSURE: 140 MMHG | DIASTOLIC BLOOD PRESSURE: 84 MMHG | RESPIRATION RATE: 16 BRPM

## 2020-01-28 DIAGNOSIS — I65.21 CAROTID ARTERY STENOSIS, SYMPTOMATIC, RIGHT: Primary | ICD-10-CM

## 2020-01-28 DIAGNOSIS — I65.21 STENOSIS OF RIGHT CAROTID ARTERY: ICD-10-CM

## 2020-01-28 PROCEDURE — 99213 OFFICE O/P EST LOW 20 MIN: CPT | Performed by: SURGERY

## 2020-01-28 PROCEDURE — 93880 EXTRACRANIAL BILAT STUDY: CPT

## 2020-01-28 NOTE — NURSING NOTE
"Initial BP (!) 140/84 (BP Location: Right arm, Patient Position: Chair, Cuff Size: Adult Regular)   Pulse 57   Resp 16  Estimated body mass index is 25.41 kg/m  as calculated from the following:    Height as of 3/11/19: 1.676 m (5' 6\").    Weight as of 5/20/19: 71.4 kg (157 lb 6.4 oz). .    Patient is here for a follow up.  candy montiel LPN    "

## 2020-01-28 NOTE — PROGRESS NOTES
Mr. Brewer underwent a right carotid endarterectomy for moderate stenosis and a right eye amaurosis fugax.  Surgery was performed in February 2019.  He had had some numbness along the right chin which have gotten better with time.    His medications include atorvastatin, aspirin and Plavix.    On examination: He appears comfortable and is in no acute distress.  Blood pressure is 140 x 84 mmHg.  Pulse rate is 57 bpm.  Respiratory rate is 16 breaths/min.  HEENT: Head is atraumatic and normocephalic, mucosa pink.  Incision is well-healed.  Eyes: Extraocular motions are intact, sclerae are anicteric.  Mental: Alert and oriented x4, judgment insight is good.  Cardiac: Regular rate and rhythm, S1 plus S2 +0.  Neuro: He is moving both upper and lower extremities with equal 5 out of 5 power.  Tongue is midline.    I reviewed the duplex sonography of the extracranial carotid arteries that was performed today.  There is no recurrence on the right of progression on the left.    I have reassured him of the findings.  He can stop Plavix and March 2020.  I have advised him to start taking a full dose of 325 mg of aspirin per day when he stopped the Plavix.    We will see him back in 1 year with repeat bilateral duplex carotid sonography for surveillance.

## 2020-02-02 DIAGNOSIS — I10 BENIGN ESSENTIAL HYPERTENSION: ICD-10-CM

## 2020-02-02 DIAGNOSIS — I65.29 STENOSIS OF CAROTID ARTERY, UNSPECIFIED LATERALITY: ICD-10-CM

## 2020-02-03 DIAGNOSIS — I65.21 SYMPTOMATIC CAROTID ARTERY STENOSIS, RIGHT: ICD-10-CM

## 2020-02-03 RX ORDER — LISINOPRIL 10 MG/1
TABLET ORAL
Qty: 90 TABLET | Refills: 1 | Status: SHIPPED | OUTPATIENT
Start: 2020-02-03 | End: 2020-08-05

## 2020-02-03 RX ORDER — ASPIRIN 81 MG/1
TABLET, DELAYED RELEASE ORAL
Qty: 90 TABLET | Refills: 1 | Status: SHIPPED | OUTPATIENT
Start: 2020-02-03 | End: 2021-02-17

## 2020-02-03 NOTE — TELEPHONE ENCOUNTER
lisinopril (PRINIVIL/ZESTRIL) 10 MG tablet    Last Written Prescription Date:  8/9/19  Last Fill Quantity: 90,  # refills: 1   Last office visit:  Hospital Consult 2/27/19 for right carotid endarterectomy.    Unable to fill per [facility] protocol.  Medication and pharmacy loaded.    Christina Alfred RN BSN  [facility]

## 2020-02-03 NOTE — TELEPHONE ENCOUNTER
"aspirin 81 MG EC tablet  Last Written Prescription Date:  8/96/19  Last Fill Quantity: 90,  # refills: 1   Last office visit: Hospital consult from 2/27/19 for right carotid endarterectomy.    Requested Prescriptions   Pending Prescriptions Disp Refills     SM ASPIRIN ADULT LOW STRENGTH 81 MG EC tablet [Pharmacy Med Name: ASPIRIN ADULT 81MG EC TABLET] 90 tablet 1     Sig: TAKE 1 TABLET BY MOUTH EVERY DAY       Analgesics (Non-Narcotic Tylenol and ASA Only) Passed - 2/3/2020  6:38 AM        Passed - Recent (12 mo) or future (30 days) visit within the authorizing provider's specialty     Patient has had an office visit with the authorizing provider or a provider within the authorizing providers department within the previous 12 mos or has a future within next 30 days. See \"Patient Info\" tab in inbasket, or \"Choose Columns\" in Meds & Orders section of the refill encounter.              Passed - Patient is age 20 years or older     If ASA is flagged for ages under 20 years old. Forward to provider for confirmation Ryes Syndrome is not a concern.              Passed - Medication is active on med list        lisinopril (PRINIVIL/ZESTRIL) 10 MG tablet [Pharmacy Med Name: LISINOPRIL 10MG TABLET] 90 tablet 1     Sig: TAKE 1 TABLET BY MOUTH EVERY DAY       ACE Inhibitors (Including Combos) Protocol Failed - 2/3/2020  6:38 AM        Failed - Blood pressure under 140/90 in past 12 months     BP Readings from Last 3 Encounters:   01/28/20 (!) 140/84   09/15/19 (!) 149/94   07/01/19 161/84                 Passed - Recent (12 mo) or future (30 days) visit within the authorizing provider's specialty     Patient has had an office visit with the authorizing provider or a provider within the authorizing providers department within the previous 12 mos or has a future within next 30 days. See \"Patient Info\" tab in inbasket, or \"Choose Columns\" in Meds & Orders section of the refill encounter.              Passed - Medication is active " on med list        Passed - Patient is age 18 or older        Passed - Normal serum creatinine on file in past 12 months     Recent Labs   Lab Test 05/21/19  0902   CR 0.92             Passed - Normal serum potassium on file in past 12 months     Recent Labs   Lab Test 05/21/19 0902   POTASSIUM 4.3           .  Prescription approved per [facility] Refill Protocol.  Christina Alfred RN BSN  [facility]

## 2020-02-04 RX ORDER — METOPROLOL SUCCINATE 25 MG/1
TABLET, EXTENDED RELEASE ORAL
Qty: 90 TABLET | Refills: 0 | Status: SHIPPED | OUTPATIENT
Start: 2020-02-04 | End: 2020-05-05

## 2020-02-04 NOTE — TELEPHONE ENCOUNTER
Routing refill request to provider for review/approval because:  Labs out of range:  BP    SITA MohanN, RN  [facility]

## 2020-02-04 NOTE — TELEPHONE ENCOUNTER
"Requested Prescriptions   Pending Prescriptions Disp Refills     metoprolol succinate ER (TOPROL-XL) 25 MG 24 hr tablet [Pharmacy Med Name: METOPROLOL SUCC 25MG ER TABLET] 90 tablet 3     Sig: TAKE 1 TABLET BY MOUTH EVERY DAY   Last Written Prescription Date:  02/19/2019  Last Fill Quantity: 90,  # refills: 3   Last office visit: No previous visit found with prescribing provider:  05/20/2019   Future Office Visit:        Should have refills    Beta-Blockers Protocol Failed - 2/3/2020  1:47 PM        Failed - Blood pressure under 140/90 in past 12 months     BP Readings from Last 3 Encounters:   01/28/20 (!) 140/84   09/15/19 (!) 149/94   07/01/19 161/84                 Passed - Patient is age 6 or older        Passed - Recent (12 mo) or future (30 days) visit within the authorizing provider's specialty     Patient has had an office visit with the authorizing provider or a provider within the authorizing providers department within the previous 12 mos or has a future within next 30 days. See \"Patient Info\" tab in inbasket, or \"Choose Columns\" in Meds & Orders section of the refill encounter.              Passed - Medication is active on med list          "

## 2020-03-24 NOTE — TELEPHONE ENCOUNTER
"Requested Prescriptions   Pending Prescriptions Disp Refills     aspirin (ASA) 325 MG EC tablet [Pharmacy Med Name: ASPIRIN 325MG EC TABLET] 30 tablet      Sig: TAKE 1 TABLET BY MOUTH EVERY DAY   Last Written Prescription Date:  02/03/2020  Last Fill Quantity: 90,  # refills: 1   Last office visit: 5/20/2019 with prescribing provider:  05/20/2019   Future Office Visit:      Should have refill left.       Analgesics (Non-Narcotic Tylenol and ASA Only) Passed - 3/24/2020  2:38 PM        Passed - Recent (12 mo) or future (30 days) visit within the authorizing provider's specialty     Patient has had an office visit with the authorizing provider or a provider within the authorizing providers department within the previous 12 mos or has a future within next 30 days. See \"Patient Info\" tab in inbasket, or \"Choose Columns\" in Meds & Orders section of the refill encounter.              Passed - Patient is age 20 years or older     If ASA is flagged for ages under 20 years old. Forward to provider for confirmation Ryes Syndrome is not a concern.              Passed - Medication is active on med list             "

## 2020-05-02 DIAGNOSIS — E78.5 HYPERLIPIDEMIA LDL GOAL <100: ICD-10-CM

## 2020-05-02 DIAGNOSIS — I65.21 SYMPTOMATIC CAROTID ARTERY STENOSIS, RIGHT: ICD-10-CM

## 2020-05-05 RX ORDER — METOPROLOL SUCCINATE 25 MG/1
TABLET, EXTENDED RELEASE ORAL
Qty: 30 TABLET | Refills: 0 | Status: SHIPPED | OUTPATIENT
Start: 2020-05-05 | End: 2020-06-09

## 2020-05-05 RX ORDER — ATORVASTATIN CALCIUM 80 MG/1
TABLET, FILM COATED ORAL
Qty: 30 TABLET | Refills: 0 | Status: SHIPPED | OUTPATIENT
Start: 2020-05-05 | End: 2020-06-09

## 2020-05-05 NOTE — TELEPHONE ENCOUNTER
Routing refill request to provider for review/approval because:  Labs out of range:  BP    SITA MohanN, RN  [facility]

## 2020-06-06 DIAGNOSIS — E78.5 HYPERLIPIDEMIA LDL GOAL <100: ICD-10-CM

## 2020-06-06 DIAGNOSIS — I65.21 SYMPTOMATIC CAROTID ARTERY STENOSIS, RIGHT: ICD-10-CM

## 2020-06-09 RX ORDER — ATORVASTATIN CALCIUM 80 MG/1
TABLET, FILM COATED ORAL
Qty: 30 TABLET | Refills: 0 | Status: SHIPPED | OUTPATIENT
Start: 2020-06-09 | End: 2020-07-06

## 2020-06-09 RX ORDER — METOPROLOL SUCCINATE 25 MG/1
TABLET, EXTENDED RELEASE ORAL
Qty: 30 TABLET | Refills: 0 | Status: SHIPPED | OUTPATIENT
Start: 2020-06-09 | End: 2020-07-06

## 2020-06-09 NOTE — TELEPHONE ENCOUNTER
"Metoprolol Succ  Last Written Prescription Date:  05/05/2020  Last Fill Quantity: 30,  # refills: 0   Last office visit: 5/20/2019 with prescribing provider:  Kathleen   Routing refill request to provider for review/approval because:  Patient needs to be seen because it has been more than 1 year since last office visit.  Elevated Blood Pressures.     Lipitor  Last Written Prescription Date:  05/29/2019  Last Fill Quantity: 90,  # refills: 0   Last office visit: 5/20/2019 with prescribing provider:  Kathleen   Routing refill request to provider for review/approval because:  Labs not current:  LDL  Patient needs to be seen because it has been more than 1 year since last office visit.    Future Office Visit:  None    Requested Prescriptions   Pending Prescriptions Disp Refills     atorvastatin (LIPITOR) 80 MG tablet [Pharmacy Med Name: ATORVASTATIN 80MG TABLET] 30 tablet 0     Sig: TAKE 1 TABLET BY MOUTH EVERY DAY       Statins Protocol Failed - 6/6/2020  1:04 AM        Failed - LDL on file in past 12 months     Recent Labs   Lab Test 05/21/19  0902   *           Failed - Recent (12 mo) or future (30 days) visit within the authorizing provider's specialty     Patient has had an office visit with the authorizing provider or a provider within the authorizing providers department within the previous 12 mos or has a future within next 30 days. See \"Patient Info\" tab in inbasket, or \"Choose Columns\" in Meds & Orders section of the refill encounter.          Passed - No abnormal creatine kinase in past 12 months     No lab results found.         Passed - Medication is active on med list        Passed - Patient is age 18 or older           metoprolol succinate ER (TOPROL-XL) 25 MG 24 hr tablet [Pharmacy Med Name: METOPROLOL SUCC 25MG ER TABLET] 30 tablet 0     Sig: TAKE 1 TABLET BY MOUTH EVERY DAY       Beta-Blockers Protocol Failed - 6/6/2020  1:04 AM        Failed - Blood pressure under 140/90 in past 12 months     " "BP Readings from Last 3 Encounters:   01/28/20 (!) 140/84   09/15/19 (!) 149/94   07/01/19 161/84           Failed - Recent (12 mo) or future (30 days) visit within the authorizing provider's specialty     Patient has had an office visit with the authorizing provider or a provider within the authorizing providers department within the previous 12 mos or has a future within next 30 days. See \"Patient Info\" tab in inbasket, or \"Choose Columns\" in Meds & Orders section of the refill encounter.          Passed - Patient is age 6 or older        Passed - Medication is active on med list         Sarah Mahoney RN   "

## 2020-07-06 DIAGNOSIS — E78.5 HYPERLIPIDEMIA LDL GOAL <100: ICD-10-CM

## 2020-07-06 DIAGNOSIS — I65.21 SYMPTOMATIC CAROTID ARTERY STENOSIS, RIGHT: ICD-10-CM

## 2020-07-06 NOTE — TELEPHONE ENCOUNTER
Routing to schedulers to set up virtual appointment for patient for medication follow up (Lipitor, Metoprolol).  Please also ask patient how much medication she has left and schedule appropriately.   If patient needs a refill before their appointment, please route to PCP for completion of refill.  Upon routing message, write WHEN appointment is scheduled and if they have enough medication to last to appointment.  Thank you!

## 2020-07-13 RX ORDER — METOPROLOL SUCCINATE 25 MG/1
TABLET, EXTENDED RELEASE ORAL
Qty: 30 TABLET | Refills: 0 | Status: SHIPPED | OUTPATIENT
Start: 2020-07-13 | End: 2020-08-05

## 2020-07-13 RX ORDER — ATORVASTATIN CALCIUM 80 MG/1
TABLET, FILM COATED ORAL
Qty: 30 TABLET | Refills: 0 | Status: SHIPPED | OUTPATIENT
Start: 2020-07-13 | End: 2020-08-05

## 2020-07-13 NOTE — TELEPHONE ENCOUNTER
Second request came from pharmacy.     Will have provider review.     Raissa Garcia, SITAN, RN  [facility]

## 2020-08-01 DIAGNOSIS — I10 BENIGN ESSENTIAL HYPERTENSION: ICD-10-CM

## 2020-08-03 RX ORDER — LISINOPRIL 10 MG/1
TABLET ORAL
Qty: 90 TABLET | Refills: 1 | OUTPATIENT
Start: 2020-08-03

## 2020-08-03 NOTE — TELEPHONE ENCOUNTER
lisinopril (PRINIVIL/ZESTRIL) 10 MG tablet  Last Written Prescription Date:  2/3/20  Last Fill Quantity: 90,  # refills: 1     Last office visit:  Hospital Consult 2/27/19 for right carotid endarterectomy.    Rx denied - patient needs appointment.    Christina LANDIS BSN  [facility]  [phone]

## 2020-08-05 DIAGNOSIS — I10 BENIGN ESSENTIAL HYPERTENSION: ICD-10-CM

## 2020-08-05 DIAGNOSIS — I65.21 SYMPTOMATIC CAROTID ARTERY STENOSIS, RIGHT: ICD-10-CM

## 2020-08-05 DIAGNOSIS — E78.5 HYPERLIPIDEMIA LDL GOAL <100: ICD-10-CM

## 2020-08-05 RX ORDER — METOPROLOL SUCCINATE 25 MG/1
TABLET, EXTENDED RELEASE ORAL
Qty: 30 TABLET | Refills: 0 | Status: SHIPPED | OUTPATIENT
Start: 2020-08-05 | End: 2020-09-09

## 2020-08-05 RX ORDER — LISINOPRIL 10 MG/1
10 TABLET ORAL DAILY
Qty: 30 TABLET | Refills: 0 | Status: SHIPPED | OUTPATIENT
Start: 2020-08-05 | End: 2020-09-09

## 2020-08-05 RX ORDER — ATORVASTATIN CALCIUM 80 MG/1
TABLET, FILM COATED ORAL
Qty: 30 TABLET | Refills: 0 | Status: SHIPPED | OUTPATIENT
Start: 2020-08-05 | End: 2020-09-09

## 2020-08-05 NOTE — TELEPHONE ENCOUNTER
Routing refill request to provider for review/approval because:  Olivia given x1 and patient did not follow up, please advise  Patient needs to be seen because it has been more than 1 year since last office visit.    SITA MohanN, RN  [facility]

## 2020-09-06 DIAGNOSIS — E78.5 HYPERLIPIDEMIA LDL GOAL <100: ICD-10-CM

## 2020-09-06 DIAGNOSIS — I65.21 SYMPTOMATIC CAROTID ARTERY STENOSIS, RIGHT: ICD-10-CM

## 2020-09-06 DIAGNOSIS — I10 BENIGN ESSENTIAL HYPERTENSION: ICD-10-CM

## 2020-09-09 RX ORDER — METOPROLOL SUCCINATE 25 MG/1
TABLET, EXTENDED RELEASE ORAL
Qty: 30 TABLET | Refills: 0 | Status: SHIPPED | OUTPATIENT
Start: 2020-09-09 | End: 2020-10-07

## 2020-09-09 RX ORDER — ATORVASTATIN CALCIUM 80 MG/1
TABLET, FILM COATED ORAL
Qty: 30 TABLET | Refills: 0 | Status: SHIPPED | OUTPATIENT
Start: 2020-09-09 | End: 2020-10-07

## 2020-09-09 RX ORDER — LISINOPRIL 10 MG/1
TABLET ORAL
Qty: 30 TABLET | Refills: 0 | Status: SHIPPED | OUTPATIENT
Start: 2020-09-09 | End: 2020-10-07

## 2020-09-09 NOTE — TELEPHONE ENCOUNTER
Routing refill request to provider for review/approval because:  Olivia given x1 and patient did not follow up, please advise  Patient needs to be seen because it has been more than 1 year since last office visit.    SITA MohanN, RN  [facility]

## 2020-09-27 ENCOUNTER — VIRTUAL VISIT (OUTPATIENT)
Dept: FAMILY MEDICINE | Facility: OTHER | Age: 60
End: 2020-09-27
Payer: COMMERCIAL

## 2020-09-27 PROCEDURE — 99421 OL DIG E/M SVC 5-10 MIN: CPT | Performed by: PHYSICIAN ASSISTANT

## 2020-09-27 NOTE — PROGRESS NOTES
"Date: 2020 11:13:21  Clinician: Jairo Arreguin  Clinician NPI: [id number]  Patient: Melo Brewer  Patient : 1960  Patient Address: [address] [place] [address]  Patient Phone: [phone]  Visit Protocol: URI  Patient Summary:  Melo is a 60 year old ( : 1960 ) male who initiated a [facility] Visit for COVID-19 (Coronavirus) evaluation and screening. When asked the question \"Please sign me up to receive news, health information and promotions from [facility].\", Melo responded \"No\".    Melo states his symptoms started gradually 3-4 days ago. After his symptoms started, they improved and then got worse again.   His symptoms consist of a cough, nasal congestion, a headache, rhinitis, nausea, myalgia, chills, malaise, and a sore throat. Melo also feels feverish.   Symptom details     Nasal secretions: The color of his mucus is green.    Cough: Melo coughs a few times an hour and his cough is not more bothersome at night. Phlegm comes into his throat when he coughs. He does not believe his cough is caused by post-nasal drip. The color of the phlegm is green and yellow.     Sore throat: Melo reports having mild throat pain (1-3 on a 10 point pain scale), does not have exudate on his tonsils, and can swallow liquids. The lymph nodes in his neck are not enlarged. A rash has not appeared on the skin since the sore throat started.     Temperature: His current temperature is 100.6 degrees Fahrenheit. Melo has had a temperature over 100 degrees Fahrenheit for 1-2 days.     Headache: He states the headache is mild (1-3 on a 10 point pain scale).      Melo denies having ear pain, anosmia, vomiting, wheezing, enlarged lymph nodes, facial pain or pressure, teeth pain, ageusia, and diarrhea. He also denies having a sinus infection within the past year, taking antibiotic medication in the past month, and having recent facial or sinus surgery in the past 60 days. He is not experiencing dyspnea.   " Precipitating events  Melo is not sure if he has been exposed to someone with strep throat. He has not recently been exposed to someone with influenza. Melo has not been in close contact with any high risk individuals.   Pertinent COVID-19 (Coronavirus) information  In the past 14 days, Melo has not worked in a congregate living setting.   He does not work or volunteer as healthcare worker or a  and does not work or volunteer in a healthcare facility.   Melo also has not lived in a congregate living setting in the past 14 days. He does not live with a healthcare worker.   Melo has not had a close contact with a laboratory-confirmed COVID-19 patient within 14 days of symptom onset.   Since December 2019, Melo and has not had upper respiratory infection or influenza-like illness. Has not been diagnosed with lab-confirmed COVID-19 test   Pertinent medical history  Melo needs a return to work/school note.   Weight: 160 lbs   Melo does not smoke or use smokeless tobacco.   Weight: 160 lbs    MEDICATIONS: metoprolol succinate oral, lisinopril oral, atorvastatin oral, ALLERGIES: NKDA  Clinician Response:  Dear Melo,   Your symptoms show that you may have coronavirus (COVID-19). This illness can cause fever, cough and trouble breathing. Many people get a mild case and get better on their own. Some people can get very sick.  What should I do?  We would like to test you for this virus.   1. Please call [phone] to schedule your visit. Explain that you were referred by [facility] to have a COVID-19 test. Be [facility].  The following will serve as your written order for this COVID Test, ordered by me, for the indication of suspected COVID [Z20.828]: The test will be ordered in [employer], our electronic health record, after you are scheduled. It will show as ordered and authorized by Osmany North MD.  Order: COVID-19 (Coronavirus) PCR for SYMPTOMATIC testing from [facility].      2.  "When it's time for your COVID test:  Stay at least 6 feet away from others. (If someone will drive you to your test, stay in the backseat, as far away from the  as you can.)   Cover your mouth and nose with a mask, tissue or washcloth.  Go straight to the testing site. Don't make any stops on the way there or back.      3.Starting now: Stay home and away from others (self-isolate) until:   You've had no fever---and no medicine that reduces fever---for one full day (24 hours). And...   Your other symptoms have gotten better. For example, your cough or breathing has improved. And...   At least 10 days have passed since your symptoms started.       During this time, don't leave the house except for testing or medical care.   Stay in your own room, even for meals. Use your own bathroom if you can.   Stay away from others in your home. No hugging, kissing or shaking hands. No visitors.  Don't go to work, school or anywhere else.    Clean \"high touch\" surfaces often (doorknobs, counters, handles, etc.). Use a household cleaning spray or wipes. You'll find a full list of  on the EPA website: www.epa.gov/pesticide-registration/list-n-disinfectants-use-against-sars-cov-2.   Cover your mouth and nose with a mask, tissue or washcloth to avoid spreading germs.  Wash your hands and face often. Use soap and water.  Caregivers in these groups are at risk for severe illness due to COVID-19:  o People 65 years and older  o People who live in a nursing home or long-term care facility  o People with chronic disease (lung, heart, cancer, diabetes, kidney, liver, immunologic)  o People who have a weakened immune system, including those who:   Are in cancer treatment  Take medicine that weakens the immune system, such as corticosteroids  Had a bone marrow or organ transplant  Have an immune deficiency  Have poorly controlled HIV or AIDS  Are obese (body mass index of 40 or higher)  Smoke regularly   o Caregivers should wear " gloves while washing dishes, handling laundry and cleaning bedrooms and bathrooms.  o Use caution when washing and drying laundry: Don't shake dirty laundry, and use the warmest water setting that you can.  o For more tips, go to www.cdc.gov/coronavirus/2019-ncov/downloads/10Things.pdf.    4.Sign up for [employer]. We know it's scary to hear that you might have COVID-19. We want to track your symptoms to make sure you're okay over the next 2 weeks. Please look for an email from [employer]---this is a free, online program that we'll use to keep in touch. To sign up, follow the link in the email. Learn more at [email or web address]  How can I take care of myself?   Get lots of rest. Drink extra fluids (unless a doctor has told you not to).   Take Tylenol (acetaminophen) for fever or pain. If you have liver or kidney problems, ask your family doctor if it's okay to take Tylenol.   Adults can take either:    650 mg (two 325 mg pills) every 4 to 6 hours, or...   1,000 mg (two 500 mg pills) every 8 hours as needed.    Note: Don't take more than 3,000 mg in one day. Acetaminophen is found in many medicines (both prescribed and over-the-counter medicines). Read all labels to be sure you don't take too much.   For children, check the Tylenol bottle for the right dose. The dose is based on the child's age or weight.    If you have other health problems (like cancer, heart failure, an organ transplant or severe kidney disease): Call your specialty clinic if you don't feel better in the next 2 days.       Know when to call 911. Emergency warning signs include:    Trouble breathing or shortness of breath Pain or pressure in the chest that doesn't go away Feeling confused like you haven't felt before, or not being able to wake up Bluish-colored lips or face.  Where can I get more information?    [facility] -- About COVID-19: [email or web address]   CDC -- What to Do If You're Sick:  www.cdc.gov/coronavirus/2019-ncov/about/steps-when-sick.html   CDC -- Ending Home Isolation: www.cdc.gov/coronavirus/2019-ncov/hcp/disposition-in-home-patients.html   [facility] -- Caring for Someone: www.cdc.gov/coronavirus/2019-ncov/if-you-are-sick/care-for-someone.html   [facility] -- Interim Guidance for Hospital Discharge to Home: www.[facility].[place]./diseases/coronavirus/hcp/hospdischarge.pdf   [facility] clinical trials (COVID-19 research studies): clinicalaffairs.[facility]-clinical-trials    Below are the COVID-19 hotlines at the [place] Department of Health ([facility]). Interpreters are available.    For health questions: Call 609-313-3248 or 1-371.664.4032 (7 a.m. to 7 p.m.) For questions about schools and childcare: Call 742-357-6511 or 1-850.632.6187 (7 a.m. to 7 p.m.)    Diagnosis: Acute upper respiratory infection, unspecified  Diagnosis ICD: J06.9  Additional Clinician Notes:   If your symptoms are not improving, or worsen, please go to one of our urgent care locations for evaluation.

## 2020-09-28 ENCOUNTER — NURSE TRIAGE (OUTPATIENT)
Dept: NURSING | Facility: CLINIC | Age: 60
End: 2020-09-28

## 2020-09-28 DIAGNOSIS — Z20.822 SUSPECTED 2019 NOVEL CORONAVIRUS INFECTION: Primary | ICD-10-CM

## 2020-09-28 NOTE — TELEPHONE ENCOUNTER
[place] wife on phone.    Melo being tested tomorrow.  O2 sat 90-95 at home.  Cough and no fever.    No chest pain, no sob at rest,  No dizziness or fatigue.  Will watch for these and additional symptoms.  Advised her to call back to triage if it goes back to 90 or less o2 sat %.    She agrees with plan.    Jillian HENNESSY [facility] Nurse Advisor        Reason for Disposition    [1] MODERATE difficulty breathing (e.g., speaks in phrases, SOB even at rest, pulse 100-120) AND [2] NEW-onset or WORSE than normal    MILD difficulty breathing (e.g., minimal/no SOB at rest, SOB with walking, pulse <100)    Additional Information    Negative: [1] Breathing stopped AND [2] hasn't returned    Negative: Choking on something    Negative: Severe difficulty breathing (e.g., struggling for each breath, speaks in single words)    Negative: Bluish (or gray) lips or face now    Negative: Difficult to awaken or acting confused (e.g., disoriented, slurred speech)    Negative: Passed out (i.e., lost consciousness, collapsed and was not responding)    Negative: Wheezing started suddenly after medicine, an allergic food or bee sting    Negative: Stridor    Negative: Slow, shallow and weak breathing    Negative: Sounds like a life-threatening emergency to the triager    Negative: Chest pain    Negative: [1] Wheezing (high pitched whistling sound) AND [2] previous asthma attacks or use of asthma medicines    Negative: [1] Difficulty breathing AND [2] only present when coughing    Negative: [1] Difficulty breathing AND [2] only from stuffy or runny nose    Negative: [1] COVID-19 exposure AND [2] no symptoms    Negative: COVID-19 and Breastfeeding, questions about    Negative: [1] Adult with possible COVID-19 symptoms AND [2] triager concerned about severity of symptoms or other causes    Negative: SEVERE or constant chest pain or pressure (Exception: mild central chest pain, present only when coughing)    Negative: MODERATE  difficulty breathing (e.g., speaks in phrases, SOB even at rest, pulse 100-120)    Negative: Patient sounds very sick or weak to the triager    Protocols used: BREATHING DIFFICULTY-A-AH, CORONAVIRUS (COVID-19) DIAGNOSED OR [id number] 8.4.20

## 2020-09-29 DIAGNOSIS — Z20.822 SUSPECTED 2019 NOVEL CORONAVIRUS INFECTION: ICD-10-CM

## 2020-09-29 PROCEDURE — U0003 INFECTIOUS AGENT DETECTION BY NUCLEIC ACID (DNA OR RNA); SEVERE ACUTE RESPIRATORY SYNDROME CORONAVIRUS 2 (SARS-COV-2) (CORONAVIRUS DISEASE [COVID-19]), AMPLIFIED PROBE TECHNIQUE, MAKING USE OF HIGH THROUGHPUT TECHNOLOGIES AS DESCRIBED BY CMS-2020-01-R: HCPCS | Performed by: FAMILY MEDICINE

## 2020-09-30 LAB
SARS-COV-2 RNA SPEC QL NAA+PROBE: ABNORMAL
SPECIMEN SOURCE: ABNORMAL

## 2020-10-01 ENCOUNTER — TELEPHONE (OUTPATIENT)
Dept: FAMILY MEDICINE | Facility: OTHER | Age: 60
End: 2020-10-01

## 2020-10-01 ENCOUNTER — TELEPHONE (OUTPATIENT)
Dept: EMERGENCY MEDICINE | Facility: CLINIC | Age: 60
End: 2020-10-01

## 2020-10-01 NOTE — TELEPHONE ENCOUNTER
Reason for Call:  Other     Detailed comments: patient and his wife were tested for covid on the same day.  Christelle, his wife, received a call that she was positive and he hasn't heard back.  Looking in the chart it looks like he is, in fact, positive.  She is wondering what he should do.  I'm not sure who to send this to.  Is this something you can help with?    Phone Number Patient can be reached at: Cell number on file:    Telephone Information:   Mobile [phone]       Best Time: any    Can we leave a detailed message on this number? YES    Call taken on 10/1/2020 at 9:42 AM by Jani Phillips

## 2020-10-01 NOTE — TELEPHONE ENCOUNTER
"Well, it would seem that the patient is positive for COVID.    He should shelter in place for the next 14 days.    Push fluids.  Take Tylenol as directed.  If respiratory compromise or symptoms become progressive, he should notify the \"[facility]\" providers.    If respiratory compromise or symptoms become really progressive, he should notify EMS.    He should not come to the clinic.    We have nothing to offer him at this time.      Kathleen"

## 2020-10-01 NOTE — TELEPHONE ENCOUNTER
I called patient, he stated that he had just been contacted with results and covid information. Demetrice Gregory MA     10/1/2020

## 2020-10-01 NOTE — TELEPHONE ENCOUNTER
"Coronavirus (COVID-19) Notification    Caller Name (Patient, parent, daughter/son, grandparent, etc)  Patient     Reason for call  Notify of Positive Coronavirus (COVID-19) lab results, assess symptoms,  review [facility] recommendations    Lab Result    Lab test:  2019-nCoV rRt-PCR or SARS-CoV-2 PCR    Oropharyngeal AND/OR nasopharyngeal swabs is POSITIVE for 2019-nCoV RNA/SARS-COV-2 PCR (COVID-19 virus)    RN Recommendations/Instructions per [facility] Coronavirus COVID-19 recommendations    Brief introduction script  Introduce self then review script:  \"I am calling on behalf of [employer].  We were notified that your Coronavirus test (COVID-19) for was POSITIVE for the virus.  I have some information to relay to you but first I wanted to mention that the [place] Dept of Health will be contacting you shortly [it's possible MD already called Patient] to talk to you more about how you are feeling and other people you have had contact with who might now also have the virus.  Also, [facility] is Partnering with the [facility] for Covid-19 research, you may be contacted directly by research staff.\"    Assessment (Inquire about Patient's current symptoms)   Assessment   Current Symptoms at time of phone call: (if no symptoms, document No symptoms]  No taste and smell   Symptoms onset (if applicable)  9/26/20     If at time of call, Patients symptoms hare worsened, the Patient should contact 911 or have someone drive them to Emergency Dept promptly:      If Patient calling 911, inform 911 personal that you have tested positive for the Coronavirus (COVID-19).  Place mask on and await 911 to arrive.    If Emergency Dept, If possible, please have another adult drive you to the Emergency Dept but you need to wear mask when in contact with other people.      Review information with Patient    Your result was positive. This means you have COVID-19 (coronavirus).  We have sent you a letter that " reviews the information that I'll be reviewing with you now.    How can I protect others?    If you have symptoms: stay home and away from others (self-isolate) until:    You've had no fever--and no medicine that reduces fever--for 1 full day (24 hours). And       Your other symptoms have gotten better. For example, your cough or breathing has improved. And     At least 10 days have passed since your symptoms started. (If you've been told by a doctor that you have a weak immune system, wait 20 days.)     If you don't have symptoms: Stay home and away from others (self-isolate) until at least 10 days have passed since your first positive COVID-19 test. (Date test collected)    During this time:    Stay in your own room, including for meals. Use your own bathroom if you can.    Stay away from others in your home. No hugging, kissing or shaking hands. No visitors.     Don't go to work, school or anywhere else.     Clean  high touch  surfaces often (doorknobs, counters, handles, etc.). Use a household cleaning spray or wipes. You'll find a full list on the EPA website at www.epa.gov/pesticide-registration/list-n-disinfectants-use-against-sars-cov-2.     Cover your mouth and nose with a mask, tissue or other face covering to avoid spreading germs.    Wash your hands and face often with soap and water.    Caregivers in these groups are at risk for severe illness due to COVID-19:  o People 65 years and older  o People who live in a nursing home or long-term care facility  o People with chronic disease (lung, heart, cancer, diabetes, kidney, liver, immunologic)  o People who have a weakened immune system, including those who:  - Are in cancer treatment  - Take medicine that weakens the immune system, such as corticosteroids  - Had a bone marrow or organ transplant  - Have an immune deficiency  - Have poorly controlled HIV or AIDS  - Are obese (body mass index of 40 or higher)  - Smoke regularly    Caregivers should wear  gloves while washing dishes, handling laundry and cleaning bedrooms and bathrooms.    Wash and dry laundry with special caution. Don't shake dirty laundry, and use the warmest water setting you can.    If you have a weakened immune system, ask your doctor about other actions you should take.    For more tips, go to www.cdc.gov/coronavirus/2019-ncov/downloads/10Things.pdf.    You should not go back to work until you meet the guidelines above for ending your home isolation. You don't need to be retested for COVID-19 before going back to work--studies show that you won't spread the virus if it's been at least 10 days since your symptoms started (or 20 days, if you have a weak immune system).    Employers: This document serves as formal notice of your employee's medical guidelines for going back to work. They must meet the above guidelines before going back to work in person.    How can I take care of myself?    1. Get lots of rest. Drink extra fluids (unless a doctor has told you not to).    2. Take Tylenol (acetaminophen) for fever or pain. If you have liver or kidney problems, ask your family doctor if it's okay to take Tylenol.     Take either:     650 mg (two 325 mg pills) every 4 to 6 hours, or     1,000 mg (two 500 mg pills) every 8 hours as needed.     Note: Don't take more than 3,000 mg in one day. Acetaminophen is found in many medicines (both prescribed and over-the-counter medicines). Read all labels to be sure you don't take too much.    For children, check the Tylenol bottle for the right dose (based on their age or weight).    3. If you have other health problems (like cancer, heart failure, an organ transplant or severe kidney disease): Call your specialty clinic if you don't feel better in the next 2 days.    4. Know when to call 911: Emergency warning signs include:    Trouble breathing or shortness of breath    Pain or pressure in the chest that doesn't go away    Feeling confused like you haven't  felt before, or not being able to wake up    Bluish-colored lips or face    5. Sign up for [employer]. We know it's scary to hear that you have COVID-19. We want to track your symptoms to make sure you're okay over the next 2 weeks. Please look for an email from [employer]--this is a free, online program that we'll use to keep in touch. To sign up, follow the link in the email. Learn more at [email or web address].    Where can I get more information?    [facility]: [email or web address]    Coronavirus Basics: www.health.[facility].[place]./diseases/coronavirus/basics.html    What to Do If You're Sick: www.cdc.gov/coronavirus/2019-ncov/about/steps-when-sick.html    Ending Home Isolation: www.cdc.gov/coronavirus/2019-ncov/hcp/disposition-in-home-patients.html     Caring for Someone with COVID-19: www.cdc.gov/coronavirus/2019-ncov/if-you-are-sick/care-for-someone.html     [facility] clinical trials (COVID-19 research studies): clinicalaffairs.[facility]-clinical-trials     A Positive COVID-19 letter will be sent via [employer] or the mail. (Exception, no letters sent to Presurgerical/Preprocedure Patients)    [Name]  Kale Ba RN  [facility]  COVID19 Results Team RN  Ph# [phone]

## 2020-10-02 ENCOUNTER — TELEPHONE (OUTPATIENT)
Dept: FAMILY MEDICINE | Facility: OTHER | Age: 60
End: 2020-10-02

## 2020-10-02 NOTE — TELEPHONE ENCOUNTER
Short Term Disability Forms - placed in providers mailbox at [place]    Daniela Interiano XRO/

## 2020-11-05 DIAGNOSIS — I65.21 SYMPTOMATIC CAROTID ARTERY STENOSIS, RIGHT: ICD-10-CM

## 2020-11-05 DIAGNOSIS — E78.5 HYPERLIPIDEMIA LDL GOAL <100: ICD-10-CM

## 2020-11-05 DIAGNOSIS — I10 BENIGN ESSENTIAL HYPERTENSION: ICD-10-CM

## 2020-11-06 RX ORDER — LISINOPRIL 10 MG/1
TABLET ORAL
Qty: 30 TABLET | Refills: 0 | Status: SHIPPED | OUTPATIENT
Start: 2020-11-06 | End: 2020-12-07

## 2020-11-06 RX ORDER — METOPROLOL SUCCINATE 25 MG/1
TABLET, EXTENDED RELEASE ORAL
Qty: 30 TABLET | Refills: 0 | Status: SHIPPED | OUTPATIENT
Start: 2020-11-06 | End: 2020-12-07

## 2020-11-06 RX ORDER — ATORVASTATIN CALCIUM 80 MG/1
TABLET, FILM COATED ORAL
Qty: 30 TABLET | Refills: 0 | Status: SHIPPED | OUTPATIENT
Start: 2020-11-06 | End: 2020-12-07

## 2020-11-06 NOTE — TELEPHONE ENCOUNTER
Routing refill request to provider for review/approval because:  Olivia given x1 and patient did not follow up, please advise  Patient needs to be seen because it has been more than 1 year since last office visit.    SITA MohanN, RN  [facility]

## 2020-12-06 DIAGNOSIS — E78.5 HYPERLIPIDEMIA LDL GOAL <100: ICD-10-CM

## 2020-12-06 DIAGNOSIS — I65.21 SYMPTOMATIC CAROTID ARTERY STENOSIS, RIGHT: ICD-10-CM

## 2020-12-06 DIAGNOSIS — I10 BENIGN ESSENTIAL HYPERTENSION: ICD-10-CM

## 2020-12-07 RX ORDER — LISINOPRIL 10 MG/1
TABLET ORAL
Qty: 30 TABLET | Refills: 0 | Status: SHIPPED | OUTPATIENT
Start: 2020-12-07 | End: 2021-01-07

## 2020-12-07 RX ORDER — ATORVASTATIN CALCIUM 80 MG/1
TABLET, FILM COATED ORAL
Qty: 30 TABLET | Refills: 0 | Status: SHIPPED | OUTPATIENT
Start: 2020-12-07 | End: 2021-01-07

## 2020-12-07 RX ORDER — METOPROLOL SUCCINATE 25 MG/1
TABLET, EXTENDED RELEASE ORAL
Qty: 30 TABLET | Refills: 0 | Status: SHIPPED | OUTPATIENT
Start: 2020-12-07 | End: 2021-01-07

## 2020-12-07 NOTE — TELEPHONE ENCOUNTER
Routing refill request to provider for review/approval because:  Olivia given x1 and patient did not follow up, please advise  Patient needs to be seen because it has been more than 1 year since last office visit.    SITA MohanN, RN  [facility]

## 2020-12-29 DIAGNOSIS — I77.9 CAROTID ARTERY DISEASE (H): Primary | ICD-10-CM

## 2021-01-06 DIAGNOSIS — E78.5 HYPERLIPIDEMIA LDL GOAL <100: ICD-10-CM

## 2021-01-06 DIAGNOSIS — I10 BENIGN ESSENTIAL HYPERTENSION: ICD-10-CM

## 2021-01-06 DIAGNOSIS — I65.21 SYMPTOMATIC CAROTID ARTERY STENOSIS, RIGHT: ICD-10-CM

## 2021-01-07 RX ORDER — ATORVASTATIN CALCIUM 80 MG/1
TABLET, FILM COATED ORAL
Qty: 15 TABLET | Refills: 0 | Status: SHIPPED | OUTPATIENT
Start: 2021-01-07 | End: 2021-01-13

## 2021-01-07 RX ORDER — LISINOPRIL 10 MG/1
TABLET ORAL
Qty: 15 TABLET | Refills: 0 | Status: SHIPPED | OUTPATIENT
Start: 2021-01-07 | End: 2021-01-13

## 2021-01-07 RX ORDER — METOPROLOL SUCCINATE 25 MG/1
TABLET, EXTENDED RELEASE ORAL
Qty: 15 TABLET | Refills: 0 | Status: SHIPPED | OUTPATIENT
Start: 2021-01-07 | End: 2021-01-13

## 2021-01-07 NOTE — TELEPHONE ENCOUNTER
Routing refill request to provider for review/approval because:  Olivia given x1 and patient did not follow up, please advise  Patient needs to be seen because it has been more than 1 year since last office visit.  Odette Washington RN

## 2021-01-13 DIAGNOSIS — I65.21 SYMPTOMATIC CAROTID ARTERY STENOSIS, RIGHT: ICD-10-CM

## 2021-01-13 DIAGNOSIS — E78.5 HYPERLIPIDEMIA LDL GOAL <100: ICD-10-CM

## 2021-01-13 DIAGNOSIS — I10 BENIGN ESSENTIAL HYPERTENSION: ICD-10-CM

## 2021-01-13 RX ORDER — METOPROLOL SUCCINATE 25 MG/1
TABLET, EXTENDED RELEASE ORAL
Qty: 7 TABLET | Refills: 0 | Status: SHIPPED | OUTPATIENT
Start: 2021-01-13 | End: 2021-02-04

## 2021-01-13 RX ORDER — ATORVASTATIN CALCIUM 80 MG/1
TABLET, FILM COATED ORAL
Qty: 7 TABLET | Refills: 0 | Status: SHIPPED | OUTPATIENT
Start: 2021-01-13 | End: 2021-02-04

## 2021-01-13 RX ORDER — LISINOPRIL 10 MG/1
TABLET ORAL
Qty: 7 TABLET | Refills: 0 | Status: SHIPPED | OUTPATIENT
Start: 2021-01-13 | End: 2021-02-04

## 2021-01-13 NOTE — LETTER
[facility]  [address] [facility] [address]  Phone: [phone]        January 14, 2021      Melo Brewer                                                                                                                                [address] [facility] [address]            Dear Mr. Brewer,    We are concerned about your health care.  We recently provided you with a medication refill.  Many medications require routine follow-up with your Doctor.      At this time we ask that: You schedule a routine office visit with your physician to follow your medication     Your prescription: Has been refilled for 1 month so you may have time for the above noted follow-up.      Thank you,      Toi Wang DO

## 2021-01-13 NOTE — TELEPHONE ENCOUNTER
metoprolol succinate ER (TOPROL-XL) 25 MG 24 hr tablet [Pharmacy Med Name: METOPROLOL SUCC 25MG ER TABLET] 15 tablet 0    Sig: TAKE 1 TABLET BY MOUTH EVERY DAY *MUST MAKE APPT. BEFORE ANY ADDITIONAL REFILLS*     lisinopril (ZESTRIL) 10 MG tablet [Pharmacy Med Name: LISINOPRIL 10MG TABLET] 15 tablet 0    Sig: TAKE 1 TABLET BY MOUTH EVERY DAY *MUST MAKE APPT. BEFORE ANY ADDITIONAL REFILLS*     atorvastatin (LIPITOR) 80 MG tablet [Pharmacy Med Name: ATORVASTATIN 80MG TABLET] 15 tablet 0    Sig: TAKE 1 TABLET BY MOUTH EVERY DAY *MUST MAKE APPT. BEFORE ANY ADDITIONAL REFILLS*     Routing refill requests to provider for review/approval because:  Olivia given x2 and patient did not follow up, please advise  Patient needs to be seen because it has been more than 1 year since last office visit.  T'd up 7 tablets for provider review.    Sending to scheduling for yearly office visit due    Leslie Cardona RN

## 2021-01-13 NOTE — TELEPHONE ENCOUNTER
"Requested Prescriptions   Pending Prescriptions Disp Refills     metoprolol succinate ER (TOPROL-XL) 25 MG 24 hr tablet [Pharmacy Med Name: METOPROLOL SUCC 25MG ER TABLET] 15 tablet 0     Sig: TAKE 1 TABLET BY MOUTH EVERY DAY *MUST MAKE APPT. BEFORE ANY ADDITIONAL REFILLS*   Last Written Prescription Date:  1/7/2021  Last Fill Quantity: 15,  # refills: 0   Last office visit: 5/20/2019 with prescribing provider:     Future Office Visit:   Next 5 appointments (look out 90 days)    Feb 08, 2021 11:00 AM  Return Visit with Sae Ramos MD  [facility] ([facility]) [address] [place]  [facility] [id number] [phone]           Beta-Blockers Protocol Failed - 1/13/2021  1:24 AM        Failed - Blood pressure under 140/90 in past 12 months     BP Readings from Last 3 Encounters:   01/28/20 (!) 140/84   09/15/19 (!) 149/94   07/01/19 161/84           Failed - Recent (12 mo) or future (30 days) visit within the authorizing provider's specialty     Patient has had an office visit with the authorizing provider or a provider within the authorizing providers department within the previous 12 mos or has a future within next 30 days. See \"Patient Info\" tab in inbasket, or \"Choose Columns\" in Meds & Orders section of the refill encounter.            Passed - Patient is age 6 or older        Passed - Medication is active on med list           lisinopril (ZESTRIL) 10 MG tablet [Pharmacy Med Name: LISINOPRIL 10MG TABLET] 15 tablet 0     Sig: TAKE 1 TABLET BY MOUTH EVERY DAY *MUST MAKE APPT. BEFORE ANY ADDITIONAL REFILLS*       ACE Inhibitors (Including Combos) Protocol Failed - 1/13/2021  1:24 AM        Failed - Blood pressure under 140/90 in past 12 months     BP Readings from Last 3 Encounters:   01/28/20 (!) 140/84   09/15/19 (!) 149/94   07/01/19 161/84           Failed - Recent (12 mo) or future (30 days) visit within the authorizing " "provider's specialty     Patient has had an office visit with the authorizing provider or a provider within the authorizing providers department within the previous 12 mos or has a future within next 30 days. See \"Patient Info\" tab in inbasket, or \"Choose Columns\" in Meds & Orders section of the refill encounter.              Failed - Normal serum creatinine on file in past 12 months     Recent Labs   Lab Test 05/21/19  0902   CR 0.92     Ok to refill medication if creatinine is low          Failed - Normal serum potassium on file in past 12 months     Recent Labs   Lab Test 05/21/19  0902   POTASSIUM 4.3           Passed - Medication is active on med list        Passed - Patient is age 18 or older           atorvastatin (LIPITOR) 80 MG tablet [Pharmacy Med Name: ATORVASTATIN 80MG TABLET] 15 tablet 0     Sig: TAKE 1 TABLET BY MOUTH EVERY DAY *MUST MAKE APPT. BEFORE ANY ADDITIONAL REFILLS*       Statins Protocol Failed - 1/13/2021  1:24 AM        Failed - LDL on file in past 12 months     Recent Labs   Lab Test 05/21/19  0902   *             Failed - Recent (12 mo) or future (30 days) visit within the authorizing provider's specialty     Patient has had an office visit with the authorizing provider or a provider within the authorizing providers department within the previous 12 mos or has a future within next 30 days. See \"Patient Info\" tab in inbasket, or \"Choose Columns\" in Meds & Orders section of the refill encounter.              Passed - No abnormal creatine kinase in past 12 months     No lab results found.             Passed - Medication is active on med list        Passed - Patient is age 18 or older         Routing refill request to provider for review/approval   Leslie Cardona RN    "

## 2021-02-08 ENCOUNTER — HOSPITAL ENCOUNTER (OUTPATIENT)
Dept: ULTRASOUND IMAGING | Facility: CLINIC | Age: 61
End: 2021-02-08
Attending: SURGERY
Payer: COMMERCIAL

## 2021-02-08 ENCOUNTER — OFFICE VISIT (OUTPATIENT)
Dept: OTHER | Facility: CLINIC | Age: 61
End: 2021-02-08
Attending: INTERNAL MEDICINE
Payer: COMMERCIAL

## 2021-02-08 VITALS — DIASTOLIC BLOOD PRESSURE: 88 MMHG | TEMPERATURE: 98 F | SYSTOLIC BLOOD PRESSURE: 148 MMHG

## 2021-02-08 DIAGNOSIS — I65.21 CAROTID ARTERY STENOSIS, SYMPTOMATIC, RIGHT: Primary | ICD-10-CM

## 2021-02-08 DIAGNOSIS — I77.9 CAROTID ARTERY DISEASE (H): ICD-10-CM

## 2021-02-08 PROCEDURE — 93880 EXTRACRANIAL BILAT STUDY: CPT

## 2021-02-08 PROCEDURE — 99213 OFFICE O/P EST LOW 20 MIN: CPT | Performed by: SURGERY

## 2021-02-08 PROCEDURE — G0463 HOSPITAL OUTPT CLINIC VISIT: HCPCS | Mod: 25

## 2021-02-08 PROCEDURE — 93880 EXTRACRANIAL BILAT STUDY: CPT | Mod: 26 | Performed by: SURGERY

## 2021-02-08 RX ORDER — ASPIRIN 325 MG
TABLET, DELAYED RELEASE (ENTERIC COATED) ORAL
COMMUNITY
Start: 2021-01-13 | End: 2021-02-17

## 2021-02-08 NOTE — PROGRESS NOTES
"Melo Brewer is a 60 year old male who presents for:  Chief Complaint   Patient presents with     RECHECK     Bilat Carotid (10:15 V[facility], 11:00 KMK)  History of R carotid endarterectomy; 1 yr f/u  to 1/28/20 appt with Dr. Ramos. In person OV        Vitals:    Vitals:    02/08/21 1029   BP: (!) 148/88   BP Location: Right arm   Patient Position: Chair   Cuff Size: Adult Large   Temp: 98  F (36.7  C)   TempSrc: Temporal       BMI:  Estimated body mass index is 25.41 kg/m  as calculated from the following:    Height as of 3/11/19: 5' 6\" (1.676 m).    Weight as of 5/20/19: 157 lb 6.4 oz (71.4 kg).    Pain Score:  Data Unavailable        Karly Almeida    "

## 2021-02-08 NOTE — PROGRESS NOTES
Mr. Brewer is a 60 year old male who underwent right carotid endarterectomy for moderate symptomatic right carotid stenosis.  This was done in February 2019.    He has no complaints.  He has not had any recurrent symptoms of transient ischemic attack or amaurosis fugax.    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    I reviewed his duplex sonography and the right carotid endarterectomy site is widely patent and without recurrence.  There is no progression on the left.    He has not had any problems with taking aspirin and Plavix.  I have advised baby aspirin and 75 mg of Plavix daily.    We will see him back in 1 year with repeat bilateral carotid duplex sonography for surveillance.

## 2021-02-17 ENCOUNTER — OFFICE VISIT (OUTPATIENT)
Dept: INTERNAL MEDICINE | Facility: CLINIC | Age: 61
End: 2021-02-17
Payer: COMMERCIAL

## 2021-02-17 VITALS
BODY MASS INDEX: 26.52 KG/M2 | HEIGHT: 66 IN | HEART RATE: 106 BPM | OXYGEN SATURATION: 99 % | DIASTOLIC BLOOD PRESSURE: 80 MMHG | TEMPERATURE: 98.6 F | WEIGHT: 165 LBS | RESPIRATION RATE: 18 BRPM | SYSTOLIC BLOOD PRESSURE: 132 MMHG

## 2021-02-17 DIAGNOSIS — E78.5 HYPERLIPIDEMIA LDL GOAL <100: ICD-10-CM

## 2021-02-17 DIAGNOSIS — I65.21 STENOSIS OF RIGHT CAROTID ARTERY: ICD-10-CM

## 2021-02-17 DIAGNOSIS — I10 BENIGN ESSENTIAL HYPERTENSION: ICD-10-CM

## 2021-02-17 DIAGNOSIS — Z12.5 SCREENING FOR PROSTATE CANCER: Primary | ICD-10-CM

## 2021-02-17 LAB
ALBUMIN SERPL-MCNC: 4.1 G/DL (ref 3.4–5)
ALBUMIN UR-MCNC: 30 MG/DL
ALP SERPL-CCNC: 47 U/L (ref 40–150)
ALT SERPL W P-5'-P-CCNC: 37 U/L (ref 0–70)
ANION GAP SERPL CALCULATED.3IONS-SCNC: 3 MMOL/L (ref 3–14)
APPEARANCE UR: CLEAR
AST SERPL W P-5'-P-CCNC: 22 U/L (ref 0–45)
BILIRUB SERPL-MCNC: 0.3 MG/DL (ref 0.2–1.3)
BILIRUB UR QL STRIP: NEGATIVE
BUN SERPL-MCNC: 25 MG/DL (ref 7–30)
CALCIUM SERPL-MCNC: 9.5 MG/DL (ref 8.5–10.1)
CHLORIDE SERPL-SCNC: 108 MMOL/L (ref 94–109)
CHOLEST SERPL-MCNC: 153 MG/DL
CO2 SERPL-SCNC: 29 MMOL/L (ref 20–32)
COLOR UR AUTO: YELLOW
CREAT SERPL-MCNC: 1.01 MG/DL (ref 0.66–1.25)
GFR SERPL CREATININE-BSD FRML MDRD: 81 ML/MIN/{1.73_M2}
GLUCOSE SERPL-MCNC: 103 MG/DL (ref 70–99)
GLUCOSE UR STRIP-MCNC: NEGATIVE MG/DL
HDLC SERPL-MCNC: 76 MG/DL
HGB UR QL STRIP: NEGATIVE
KETONES UR STRIP-MCNC: 5 MG/DL
LDLC SERPL CALC-MCNC: 58 MG/DL
LEUKOCYTE ESTERASE UR QL STRIP: NEGATIVE
MUCOUS THREADS #/AREA URNS LPF: PRESENT /LPF
NITRATE UR QL: NEGATIVE
NONHDLC SERPL-MCNC: 77 MG/DL
PH UR STRIP: 5 PH (ref 5–7)
POTASSIUM SERPL-SCNC: 4.5 MMOL/L (ref 3.4–5.3)
PROT SERPL-MCNC: 7 G/DL (ref 6.8–8.8)
PSA SERPL-ACNC: 0.67 UG/L (ref 0–4)
RBC #/AREA URNS AUTO: <1 /HPF (ref 0–2)
SODIUM SERPL-SCNC: 140 MMOL/L (ref 133–144)
SOURCE: ABNORMAL
SP GR UR STRIP: 1.03 (ref 1–1.03)
TRIGL SERPL-MCNC: 93 MG/DL
UROBILINOGEN UR STRIP-MCNC: 0 MG/DL (ref 0–2)
WBC #/AREA URNS AUTO: <1 /HPF (ref 0–5)

## 2021-02-17 PROCEDURE — 80061 LIPID PANEL: CPT | Performed by: INTERNAL MEDICINE

## 2021-02-17 PROCEDURE — G0103 PSA SCREENING: HCPCS | Performed by: INTERNAL MEDICINE

## 2021-02-17 PROCEDURE — 80053 COMPREHEN METABOLIC PANEL: CPT | Performed by: INTERNAL MEDICINE

## 2021-02-17 PROCEDURE — 99214 OFFICE O/P EST MOD 30 MIN: CPT | Performed by: INTERNAL MEDICINE

## 2021-02-17 PROCEDURE — 81001 URINALYSIS AUTO W/SCOPE: CPT | Performed by: INTERNAL MEDICINE

## 2021-02-17 PROCEDURE — 36415 COLL VENOUS BLD VENIPUNCTURE: CPT | Performed by: INTERNAL MEDICINE

## 2021-02-17 RX ORDER — LISINOPRIL 10 MG/1
TABLET ORAL
Qty: 90 TABLET | Refills: 1 | Status: SHIPPED | OUTPATIENT
Start: 2021-02-17 | End: 2021-08-13

## 2021-02-17 RX ORDER — ATORVASTATIN CALCIUM 80 MG/1
TABLET, FILM COATED ORAL
Qty: 90 TABLET | Refills: 1 | Status: SHIPPED | OUTPATIENT
Start: 2021-02-17 | End: 2021-08-13

## 2021-02-17 RX ORDER — METOPROLOL SUCCINATE 25 MG/1
TABLET, EXTENDED RELEASE ORAL
Qty: 90 TABLET | Refills: 1 | Status: SHIPPED | OUTPATIENT
Start: 2021-02-17 | End: 2021-08-13

## 2021-02-17 ASSESSMENT — PAIN SCALES - GENERAL: PAINLEVEL: NO PAIN (0)

## 2021-02-17 ASSESSMENT — MIFFLIN-ST. JEOR: SCORE: 1506.19

## 2021-02-17 NOTE — PROGRESS NOTES
"        Iftikhar Alejo is a 60 year old who presents for the following health issues     HPI       Hypertension Follow-up      Do you check your blood pressure regularly outside of the clinic? No     Are you following a low salt diet? Yes    Are your blood pressures ever more than 140 on the top number (systolic) OR more   than 90 on the bottom number (diastolic), for example 140/90? Yes        EMR reviewed including:             Complaint, History of Chief Complaint, Corresponding Review of Systems, and Complaint Specific Physical Examination.    #1   patient presents today for follow-up of hypertension.  Taking medication compliantly including lisinopril, metoprolol.  Blood pressure currently 132/82.  Denies headache, chest pain or shortness of breath.       Exam:   LUNGS: clear bilaterally, airflow is brisk, no intercostal retraction or stridor is noted. No coughing is noted during visit.   HEART:  regular without rubs, clicks, gallops, or murmurs. PMI is nondisplaced. Upstrokes are brisk. S1,S2 are heard.   NEURO: Pt is alert and appropriate. No neurologic lateralization is noted. Cranial nerves 2-12 are intact. Peripheral sensory and motor function are grossly normal.       #2   History of carotid occlusion.  Recent carotid ultrasound as ordered by vascular specialty shows no occlusion on the right post endarterectomy and less than 50% of the left.  Patient denies symptoms of amaurosis or unilateral neurologic deficit.       Exam:   ENT: Pharynx is non-erythemous, minimal PND, no significant nasal obstruction, TM's not red or retracted, hearing intact bilaterally. No carotid bruits are heard. No JVD seen. Thyroid is not nodular or enlarged.        Vital Signs:   /80 (BP Location: Right arm, Patient Position: Sitting, Cuff Size: Adult Regular)   Pulse 106   Temp 98.6  F (37  C) (Temporal)   Resp 18   Ht 1.676 m (5' 6\")   Wt 74.8 kg (165 lb)   SpO2 99%   BMI 26.63 kg/m               Decision " Making    Problem and Complexity     1. Benign essential hypertension  Continue beta-blocker and ACE inhibitor.  Check renal function and electrolytes.  Check urine for protein.  - metoprolol succinate ER (TOPROL-XL) 25 MG 24 hr tablet; TAKE 1 TABLET BY MOUTH EVERY DAY  Dispense: 90 tablet; Refill: 1  - lisinopril (ZESTRIL) 10 MG tablet; TAKE 1 TABLET BY MOUTH EVERY DAY  Dispense: 90 tablet; Refill: 1  - Comprehensive metabolic panel (BMP + Alb, Alk Phos, ALT, AST, Total. Bili, TP)  - *UA reflex to Microscopic and Culture ([place]; [facility]; [facility]; [facility]; [facility]; [facility]; [place]; [place])    2. Stenosis of right carotid artery  Currently stable.  Continue statin therapy and aspirin.  Follow-up is scheduled for annual ultrasound  - aspirin ( ASPIRIN ADULT LOW STRENGTH) 81 MG EC tablet; Take 1 tablet (81 mg) by mouth daily  Dispense: 90 tablet; Refill: 1    3. Hyperlipidemia LDL goal <100  Continue statin therapy.  Check lipid panel and adjust dosage accordingly  - atorvastatin (LIPITOR) 80 MG tablet; TAKE 1 TABLET BY MOUTH EVERY DAY  Dispense: 90 tablet; Refill: 1  - Lipid panel reflex to direct LDL Fasting    4. Screening for prostate cancer  Screening performed  - PSA, screen          ------------------------------------------------------------------------------------------------------------------------------  Data    4=1/3 5=2/3    1  >3  Specialty (external) notes reviewed:   Vascular specialty notes appreciated    Tests ordered (by provider) reviewed:   None     Tests ordered (by provider):   Multiple    Independent Historians Interview:   None    2  Review of outside (other providers) Tests:   None    3   Verbal Discussion with Specialists:    None      ----------------------------------------------------------------------------------------------------------------------------------  Risk   Prescription drug management:   Ongoing                                High risk  for toxicity:     Decision regarding surgery:    None     Social determinants of health:   None     Decision to withhold therapy:   None                                 FOLLOW UP   I have asked the patient to make an appointment for followup with me in 4 months            I have carefully explained the diagnosis and treatment options to the patient.  The patient has displayed an understanding of the above, and all subsequent questions were answered.      DO LORENZO Aguilar    Portions of this note were produced using [employer]  Although every attempt at real-time proof reading has been made, occasional grammar/syntax errors may have been missed.

## 2021-03-13 ENCOUNTER — HEALTH MAINTENANCE LETTER (OUTPATIENT)
Age: 61
End: 2021-03-13

## 2021-08-11 DIAGNOSIS — I65.21 STENOSIS OF RIGHT CAROTID ARTERY: ICD-10-CM

## 2021-08-11 DIAGNOSIS — I10 BENIGN ESSENTIAL HYPERTENSION: ICD-10-CM

## 2021-08-11 DIAGNOSIS — E78.5 HYPERLIPIDEMIA LDL GOAL <100: ICD-10-CM

## 2021-08-13 RX ORDER — METOPROLOL SUCCINATE 25 MG/1
TABLET, EXTENDED RELEASE ORAL
Qty: 90 TABLET | Refills: 1 | Status: SHIPPED | OUTPATIENT
Start: 2021-08-13 | End: 2022-02-18

## 2021-08-13 RX ORDER — ATORVASTATIN CALCIUM 80 MG/1
TABLET, FILM COATED ORAL
Qty: 90 TABLET | Refills: 1 | Status: SHIPPED | OUTPATIENT
Start: 2021-08-13 | End: 2022-02-18

## 2021-08-13 RX ORDER — ASPIRIN 81 MG/1
TABLET, DELAYED RELEASE ORAL
Qty: 90 TABLET | Refills: 1 | Status: SHIPPED | OUTPATIENT
Start: 2021-08-13 | End: 2022-02-18

## 2021-08-13 RX ORDER — LISINOPRIL 10 MG/1
TABLET ORAL
Qty: 90 TABLET | Refills: 1 | Status: SHIPPED | OUTPATIENT
Start: 2021-08-13 | End: 2022-02-18

## 2021-10-23 ENCOUNTER — HEALTH MAINTENANCE LETTER (OUTPATIENT)
Age: 61
End: 2021-10-23

## 2022-02-17 DIAGNOSIS — I10 BENIGN ESSENTIAL HYPERTENSION: ICD-10-CM

## 2022-02-17 DIAGNOSIS — I65.21 STENOSIS OF RIGHT CAROTID ARTERY: ICD-10-CM

## 2022-02-17 DIAGNOSIS — E78.5 HYPERLIPIDEMIA LDL GOAL <100: ICD-10-CM

## 2022-02-17 NOTE — LETTER
[address] [address]  Phone: [phone]        February 22, 2022      Melo DE SANTIAGO Daniella                                                                                                                                [address] [place] [id number]            Dear Mr. Brewer,    We are concerned about your health care.  We recently provided you with a medication refill.  Many medications require routine follow-up with your Doctor.      At this time we ask that: You schedule an appointment for your annual physical and labs.  Please call the clinic at [phone] option 1 to schedule.     Your prescription: (Toprol/ Lipitor/ Zestril/ Aspirin) Has been refilled for 1 time by RN so you may have time for the above noted follow-up.      Thank you,      Toi Wang DO  Care Team

## 2022-02-18 RX ORDER — METOPROLOL SUCCINATE 25 MG/1
TABLET, EXTENDED RELEASE ORAL
Qty: 90 TABLET | Refills: 0 | Status: SHIPPED | OUTPATIENT
Start: 2022-02-18 | End: 2022-05-23

## 2022-02-18 RX ORDER — ASPIRIN 81 MG/1
TABLET, COATED ORAL
Qty: 90 TABLET | Refills: 0 | Status: SHIPPED | OUTPATIENT
Start: 2022-02-18 | End: 2022-05-23

## 2022-02-18 RX ORDER — ATORVASTATIN CALCIUM 80 MG/1
TABLET, FILM COATED ORAL
Qty: 90 TABLET | Refills: 0 | Status: SHIPPED | OUTPATIENT
Start: 2022-02-18 | End: 2022-05-23

## 2022-02-18 RX ORDER — LISINOPRIL 10 MG/1
TABLET ORAL
Qty: 90 TABLET | Refills: 0 | Status: SHIPPED | OUTPATIENT
Start: 2022-02-18 | End: 2022-05-23

## 2022-02-18 NOTE — TELEPHONE ENCOUNTER
Pending Prescriptions:                       Disp   Refills    metoprolol succinate ER (TOPROL-XL) 25 MG*90 tab*0            Sig: TAKE 1 TABLET BY MOUTH EVERY DAY    atorvastatin (LIPITOR) 80 MG tablet [Phar*90 tab*0            Sig: TAKE 1 TABLET BY MOUTH EVERY DAY    lisinopril (ZESTRIL) 10 MG tablet [Pharma*90 tab*0            Sig: TAKE 1 TABLET BY MOUTH EVERY DAY    ASPIRIN LOW DOSE 81 MG EC tablet [Pharmac*90 tab*0            Sig: TAKE 1 TABLET (81 MG) BY MOUTH DAILY    Medication is being filled for 1 time keyshawn refill only due to:  Patient is due for Annual exam with fasting labs    Please call and help schedule.  Thank you!

## 2022-02-18 NOTE — TELEPHONE ENCOUNTER
Phone rang without answer, please try again. See below.   Monica Gottlieb MA on 2/18/2022 at 2:51 PM

## 2022-03-23 ENCOUNTER — OFFICE VISIT (OUTPATIENT)
Dept: INTERNAL MEDICINE | Facility: CLINIC | Age: 62
End: 2022-03-23
Payer: COMMERCIAL

## 2022-03-23 VITALS
WEIGHT: 161 LBS | DIASTOLIC BLOOD PRESSURE: 84 MMHG | OXYGEN SATURATION: 98 % | HEIGHT: 66 IN | TEMPERATURE: 98.5 F | BODY MASS INDEX: 25.88 KG/M2 | RESPIRATION RATE: 16 BRPM | HEART RATE: 92 BPM | SYSTOLIC BLOOD PRESSURE: 142 MMHG

## 2022-03-23 DIAGNOSIS — Z11.4 SCREENING FOR HIV WITHOUT PRESENCE OF RISK FACTORS: ICD-10-CM

## 2022-03-23 DIAGNOSIS — Z00.00 ROUTINE GENERAL MEDICAL EXAMINATION AT A HEALTH CARE FACILITY: Primary | ICD-10-CM

## 2022-03-23 DIAGNOSIS — I65.21 STENOSIS OF RIGHT CAROTID ARTERY: ICD-10-CM

## 2022-03-23 DIAGNOSIS — Z11.59 ENCOUNTER FOR HEPATITIS C SCREENING TEST FOR LOW RISK PATIENT: ICD-10-CM

## 2022-03-23 DIAGNOSIS — Z12.5 SCREENING FOR PROSTATE CANCER: ICD-10-CM

## 2022-03-23 DIAGNOSIS — I10 BENIGN ESSENTIAL HYPERTENSION: ICD-10-CM

## 2022-03-23 DIAGNOSIS — E78.5 HYPERLIPIDEMIA LDL GOAL <100: ICD-10-CM

## 2022-03-23 DIAGNOSIS — F33.1 MODERATE EPISODE OF RECURRENT MAJOR DEPRESSIVE DISORDER (H): ICD-10-CM

## 2022-03-23 LAB
ALBUMIN SERPL-MCNC: 3.8 G/DL (ref 3.4–5)
ALBUMIN UR-MCNC: NEGATIVE MG/DL
ALP SERPL-CCNC: 55 U/L (ref 40–150)
ALT SERPL W P-5'-P-CCNC: 41 U/L (ref 0–70)
ANION GAP SERPL CALCULATED.3IONS-SCNC: 3 MMOL/L (ref 3–14)
APPEARANCE UR: CLEAR
AST SERPL W P-5'-P-CCNC: 19 U/L (ref 0–45)
BASOPHILS # BLD AUTO: 0.1 10E3/UL (ref 0–0.2)
BASOPHILS NFR BLD AUTO: 1 %
BILIRUB SERPL-MCNC: 0.7 MG/DL (ref 0.2–1.3)
BILIRUB UR QL STRIP: NEGATIVE
BUN SERPL-MCNC: 18 MG/DL (ref 7–30)
CALCIUM SERPL-MCNC: 8.9 MG/DL (ref 8.5–10.1)
CHLORIDE BLD-SCNC: 107 MMOL/L (ref 94–109)
CHOLEST SERPL-MCNC: 139 MG/DL
CO2 SERPL-SCNC: 29 MMOL/L (ref 20–32)
COLOR UR AUTO: YELLOW
CREAT SERPL-MCNC: 0.9 MG/DL (ref 0.66–1.25)
EOSINOPHIL # BLD AUTO: 0.4 10E3/UL (ref 0–0.7)
EOSINOPHIL NFR BLD AUTO: 8 %
ERYTHROCYTE [DISTWIDTH] IN BLOOD BY AUTOMATED COUNT: 12.9 % (ref 10–15)
FASTING STATUS PATIENT QL REPORTED: YES
GFR SERPL CREATININE-BSD FRML MDRD: >90 ML/MIN/1.73M2
GLUCOSE BLD-MCNC: 100 MG/DL (ref 70–99)
GLUCOSE UR STRIP-MCNC: NEGATIVE MG/DL
HCT VFR BLD AUTO: 43 % (ref 40–53)
HCV AB SERPL QL IA: NONREACTIVE
HDLC SERPL-MCNC: 73 MG/DL
HGB BLD-MCNC: 14.4 G/DL (ref 13.3–17.7)
HGB UR QL STRIP: NEGATIVE
HIV 1+2 AB+HIV1 P24 AG SERPL QL IA: NONREACTIVE
IMM GRANULOCYTES # BLD: 0 10E3/UL
IMM GRANULOCYTES NFR BLD: 0 %
KETONES UR STRIP-MCNC: NEGATIVE MG/DL
LDLC SERPL CALC-MCNC: 55 MG/DL
LEUKOCYTE ESTERASE UR QL STRIP: NEGATIVE
LYMPHOCYTES # BLD AUTO: 1.6 10E3/UL (ref 0.8–5.3)
LYMPHOCYTES NFR BLD AUTO: 27 %
MCH RBC QN AUTO: 32.1 PG (ref 26.5–33)
MCHC RBC AUTO-ENTMCNC: 33.5 G/DL (ref 31.5–36.5)
MCV RBC AUTO: 96 FL (ref 78–100)
MONOCYTES # BLD AUTO: 0.5 10E3/UL (ref 0–1.3)
MONOCYTES NFR BLD AUTO: 9 %
NEUTROPHILS # BLD AUTO: 3.1 10E3/UL (ref 1.6–8.3)
NEUTROPHILS NFR BLD AUTO: 55 %
NITRATE UR QL: NEGATIVE
NONHDLC SERPL-MCNC: 66 MG/DL
NRBC # BLD AUTO: 0 10E3/UL
NRBC BLD AUTO-RTO: 0 /100
PH UR STRIP: 6 [PH] (ref 5–7)
PLATELET # BLD AUTO: 275 10E3/UL (ref 150–450)
POTASSIUM BLD-SCNC: 4.3 MMOL/L (ref 3.4–5.3)
PROT SERPL-MCNC: 6.8 G/DL (ref 6.8–8.8)
PSA SERPL-MCNC: 0.92 UG/L (ref 0–4)
RBC # BLD AUTO: 4.49 10E6/UL (ref 4.4–5.9)
SODIUM SERPL-SCNC: 139 MMOL/L (ref 133–144)
SP GR UR STRIP: 1.02 (ref 1–1.03)
TRIGL SERPL-MCNC: 55 MG/DL
UROBILINOGEN UR STRIP-MCNC: NORMAL MG/DL
WBC # BLD AUTO: 5.7 10E3/UL (ref 4–11)

## 2022-03-23 PROCEDURE — 99396 PREV VISIT EST AGE 40-64: CPT | Performed by: INTERNAL MEDICINE

## 2022-03-23 PROCEDURE — G0103 PSA SCREENING: HCPCS | Performed by: INTERNAL MEDICINE

## 2022-03-23 PROCEDURE — 80053 COMPREHEN METABOLIC PANEL: CPT | Performed by: INTERNAL MEDICINE

## 2022-03-23 PROCEDURE — 80061 LIPID PANEL: CPT | Performed by: INTERNAL MEDICINE

## 2022-03-23 PROCEDURE — 86803 HEPATITIS C AB TEST: CPT | Performed by: INTERNAL MEDICINE

## 2022-03-23 PROCEDURE — 36415 COLL VENOUS BLD VENIPUNCTURE: CPT | Performed by: INTERNAL MEDICINE

## 2022-03-23 PROCEDURE — 81003 URINALYSIS AUTO W/O SCOPE: CPT | Performed by: INTERNAL MEDICINE

## 2022-03-23 PROCEDURE — 87389 HIV-1 AG W/HIV-1&-2 AB AG IA: CPT | Performed by: INTERNAL MEDICINE

## 2022-03-23 PROCEDURE — 85025 COMPLETE CBC W/AUTO DIFF WBC: CPT | Performed by: INTERNAL MEDICINE

## 2022-03-23 RX ORDER — CITALOPRAM HYDROBROMIDE 20 MG/1
20 TABLET ORAL DAILY
Qty: 30 TABLET | Refills: 0 | Status: SHIPPED | OUTPATIENT
Start: 2022-03-23 | End: 2022-04-05

## 2022-03-23 ASSESSMENT — ANXIETY QUESTIONNAIRES
7. FEELING AFRAID AS IF SOMETHING AWFUL MIGHT HAPPEN: NEARLY EVERY DAY
2. NOT BEING ABLE TO STOP OR CONTROL WORRYING: MORE THAN HALF THE DAYS
6. BECOMING EASILY ANNOYED OR IRRITABLE: NEARLY EVERY DAY
1. FEELING NERVOUS, ANXIOUS, OR ON EDGE: NEARLY EVERY DAY
GAD7 TOTAL SCORE: 18
3. WORRYING TOO MUCH ABOUT DIFFERENT THINGS: NEARLY EVERY DAY
5. BEING SO RESTLESS THAT IT IS HARD TO SIT STILL: MORE THAN HALF THE DAYS
IF YOU CHECKED OFF ANY PROBLEMS ON THIS QUESTIONNAIRE, HOW DIFFICULT HAVE THESE PROBLEMS MADE IT FOR YOU TO DO YOUR WORK, TAKE CARE OF THINGS AT HOME, OR GET ALONG WITH OTHER PEOPLE: SOMEWHAT DIFFICULT

## 2022-03-23 ASSESSMENT — PAIN SCALES - GENERAL: PAINLEVEL: NO PAIN (0)

## 2022-03-23 ASSESSMENT — PATIENT HEALTH QUESTIONNAIRE - PHQ9: 5. POOR APPETITE OR OVEREATING: MORE THAN HALF THE DAYS

## 2022-03-23 NOTE — PROGRESS NOTES
SUBJECTIVE:   CC: Melo Brewer is an 61 year old male who presents for preventative health visit.       Patient has been advised of split billing requirements and indicates understanding: Yes  Healthy Habits:     Getting at least 3 servings of Calcium per day:  NO    Bi-annual eye exam:  NO    Dental care twice a year:  Yes    Sleep apnea or symptoms of sleep apnea:  None    Diet:  Regular (no restrictions)    Frequency of exercise:  6-7 days/week    Duration of exercise:  15-30 minutes    Taking medications regularly:  Yes    Medication side effects:  None    PHQ-2 Total Score: 0    Additional concerns today:  Yes          Patient acknowledges some depression.  PHQ-9 score high.  Recently went to marital counseling and was diagnosed with counselor is having a problem with depression and anxiety.  Finds himself irritable with decreased interest in activities of enjoyment.  Sleeping somewhat poorly.  Eating well.  No significant weight change.  Specifically denies any suicidal or harmful ideation.    Today's PHQ-2 Score:   PHQ-2 ( 1999 [employer]) 3/23/2022   Q1: Little interest or pleasure in doing things 0   Q2: Feeling down, depressed or hopeless 0   PHQ-2 Score 0   PHQ-2 Total Score (12-17 Years)- Positive if 3 or more points; Administer PHQ-A if positive -   Q1: Little interest or pleasure in doing things Not at all   Q2: Feeling down, depressed or hopeless Not at all   PHQ-2 Score 0       Abuse: Current or Past(Physical, Sexual or Emotional)- No  Do you feel safe in your environment? Yes        Social History     Tobacco Use     Smoking status: Former Smoker     Packs/day: 0.50     Years: 15.00     Pack years: 7.50     Types: Cigarettes     Smokeless tobacco: Never Used   Substance Use Topics     Alcohol use: Yes     Comment: 1-2 daily         Alcohol Use 3/23/2022   Prescreen: >3 drinks/day or >7 drinks/week? Yes   Prescreen: >3 drinks/day or >7 drinks/week? -   AUDIT SCORE  6       Last PSA:   PSA   Date Value  Ref Range Status   02/17/2021 0.67 0 - 4 ug/L Final     Comment:     Assay Method:  Chemiluminescence using Siemens Vista analyzer       Reviewed orders with patient. Reviewed health maintenance and updated orders accordingly - Yes  Lab work is in process  BP Readings from Last 3 Encounters:   03/23/22 (!) 142/84   02/17/21 132/80   02/08/21 (!) 148/88    Wt Readings from Last 3 Encounters:   03/23/22 73 kg (161 lb)   02/17/21 74.8 kg (165 lb)   05/20/19 71.4 kg (157 lb 6.4 oz)                  Patient Active Problem List   Diagnosis     CARDIOVASCULAR SCREENING; LDL GOAL LESS THAN 130     Carotid artery disease (H)     Carotid artery stenosis     Past Surgical History:   Procedure Laterality Date     BACK SURGERY       COLONOSCOPY N/A 5/19/2017    Procedure: COLONOSCOPY;  Colonoscopy;  Surgeon: Robert Elmore MD;  Location: [facility]     ENDARTERECTOMY CAROTID Right 2/26/2019    Procedure: RIGHT CAROTID ENDARTERECTOMY WITH EEG AND INTRAOPERATIVE ULTRASOUND;  Surgeon: Sae Ramos MD;  Location: [facility]     HC EXCIS PRIMARY GANGLION WRIST  12/23/2004    Left wrist     HC INJ EPIDURAL LUMBAR/SACRAL W/WO CONTRAST  2009     HC REMOVAL OF TONSILS,<13 Y/O  1967    Tonsils <13y.o.     SURGICAL HISTORY OF -   4/24/2009    Left C6-C7 Microdiskectomy.       Social History     Tobacco Use     Smoking status: Former Smoker     Packs/day: 0.50     Years: 15.00     Pack years: 7.50     Types: Cigarettes     Smokeless tobacco: Never Used   Substance Use Topics     Alcohol use: Yes     Comment: 1-2 daily     Family History   Problem Relation Age of Onset     Hypertension Father         on medication HTN     Hyperlipidemia Father      Hypertension Brother          Current Outpatient Medications   Medication Sig Dispense Refill     acetaminophen (TYLENOL 8 HOUR ARTHRITIS PAIN) 650 MG CR tablet Take 1,300 mg by mouth 2 times daily       ASPIRIN LOW DOSE 81 MG EC tablet TAKE 1 TABLET (81 MG) BY MOUTH DAILY 90 tablet 0      atorvastatin (LIPITOR) 80 MG tablet TAKE 1 TABLET BY MOUTH EVERY DAY 90 tablet 0     citalopram (CELEXA) 20 MG tablet Take 1 tablet (20 mg) by mouth daily 30 tablet 0     ibuprofen (ADVIL/MOTRIN) 200 MG tablet Take 4 tablets (800 mg) by mouth every 8 hours as needed for pain (with food)       lisinopril (ZESTRIL) 10 MG tablet TAKE 1 TABLET BY MOUTH EVERY DAY 90 tablet 0     metoprolol succinate ER (TOPROL-XL) 25 MG 24 hr tablet TAKE 1 TABLET BY MOUTH EVERY DAY 90 tablet 0     Allergies   Allergen Reactions     Apple Anaphylaxis     Bees Anaphylaxis       Reviewed and updated as needed this visit by clinical staff   Tobacco   Meds              Reviewed and updated as needed this visit by Provider                 No past medical history on file.   Past Surgical History:   Procedure Laterality Date     BACK SURGERY       COLONOSCOPY N/A 5/19/2017    Procedure: COLONOSCOPY;  Colonoscopy;  Surgeon: Robert Elmore MD;  Location: [facility]     ENDARTERECTOMY CAROTID Right 2/26/2019    Procedure: RIGHT CAROTID ENDARTERECTOMY WITH EEG AND INTRAOPERATIVE ULTRASOUND;  Surgeon: Sae Ramos MD;  Location: [facility]     HC EXCIS PRIMARY GANGLION WRIST  12/23/2004    Left wrist     HC INJ EPIDURAL LUMBAR/SACRAL W/WO CONTRAST  2009     HC REMOVAL OF TONSILS,<13 Y/O  1967    Tonsils <13y.o.     SURGICAL HISTORY OF -   4/24/2009    Left C6-C7 Microdiskectomy.       Review of Systems  CONSTITUTIONAL: NEGATIVE for fever, chills, change in weight  INTEGUMENTARY/SKIN: NEGATIVE for worrisome rashes, moles or lesions  EYES: NEGATIVE for vision changes or irritation  ENT: NEGATIVE for ear, mouth and throat problems  RESP: NEGATIVE for significant cough or SOB  CV: NEGATIVE for chest pain, palpitations or peripheral edema  GI: NEGATIVE for nausea, abdominal pain, heartburn, or change in bowel habits   male: negative for dysuria, hematuria, decreased urinary stream, erectile dysfunction, urethral discharge  MUSCULOSKELETAL: NEGATIVE  "for significant arthralgias or myalgia  NEURO: NEGATIVE for weakness, dizziness or paresthesias  PSYCHIATRIC: Symptoms consistent with depression as discussed above.    OBJECTIVE:   BP (!) 142/84   Pulse 92   Temp 98.5  F (36.9  C) (Temporal)   Resp 16   Ht 1.676 m (5' 6\")   Wt 73 kg (161 lb)   SpO2 98%   BMI 25.99 kg/m      Physical Exam  GENERAL: healthy, alert and no distress  EYES: Eyes grossly normal to inspection, PERRL and conjunctivae and sclerae normal  HENT: ear canals and TM's normal, nose and mouth without ulcers or lesions  NECK: no adenopathy, no asymmetry, masses, or scars and thyroid normal to palpation  RESP: lungs clear to auscultation - no rales, rhonchi or wheezes  CV: regular rates and rhythm, normal S1 S2, no S3 or S4, no murmur, click or rub, peripheral pulses strong, no peripheral edema and evidence of previous right carotid endarterectomy is noted.  No bruits are heard at this time bilaterally  ABDOMEN: soft, nontender, no hepatosplenomegaly, no masses and bowel sounds normal  MS: no gross musculoskeletal defects noted, no edema  SKIN: no suspicious lesions or rashes  NEURO: Normal strength and tone, mentation intact and speech normal  PSYCH: mentation appears normal, affect normal/bright    Diagnostic Test Results:  No results found for this or any previous visit (from the past 24 hour(s)).    ASSESSMENT/PLAN:       ICD-10-CM    1. Routine general medical examination at a health care facility  Z00.00    2. Moderate episode of recurrent major depressive disorder (H)  F33.1 citalopram (CELEXA) 20 MG tablet   3. Stenosis of right carotid artery  I65.21    4. Benign essential hypertension  I10 UA Macro with Reflex to Micro and Culture - lab collect     Comprehensive metabolic panel (BMP + Alb, Alk Phos, ALT, AST, Total. Bili, TP)     UA Macro with Reflex to Micro and Culture - lab collect     Comprehensive metabolic panel (BMP + Alb, Alk Phos, ALT, AST, Total. Bili, TP)   5. " "Hyperlipidemia LDL goal <100  E78.5 Lipid panel reflex to direct LDL Fasting     Lipid panel reflex to direct LDL Fasting   6. Screening for prostate cancer  Z12.5 PSA, screen     PSA, screen   7. Screening for HIV without presence of risk factors  Z11.4 HIV Antigen Antibody Combo     HIV Antigen Antibody Combo   8. Encounter for hepatitis C screening test for low risk patient  Z11.59 Hepatitis C Screen Reflex to HCV RNA Quant and Genotype     Hepatitis C Screen Reflex to HCV RNA Quant and Genotype       Patient has been advised of split billing requirements and indicates understanding: Yes    COUNSELING:   Reviewed preventive health counseling, as reflected in patient instructions       Regular exercise       Healthy diet/nutrition       Vision screening       Hearing screening       Colorectal cancer screening       Prostate cancer screening    Estimated body mass index is 25.99 kg/m  as calculated from the following:    Height as of this encounter: 1.676 m (5' 6\").    Weight as of this encounter: 73 kg (161 lb).         He reports that he has quit smoking. His smoking use included cigarettes. He has a 7.50 pack-year smoking history. He has never used smokeless tobacco.      Counseling Resources:  ATP IV Guidelines  Pooled Cohorts Equation Calculator  FRAX Risk Assessment  ICSI Preventive Guidelines  Dietary Guidelines for Americans, 2010  USDA's MyPlate  ASA Prophylaxis  Lung CA Screening    Toi Wang DO  [facility]  "

## 2022-03-24 DIAGNOSIS — F33.1 MODERATE EPISODE OF RECURRENT MAJOR DEPRESSIVE DISORDER (H): ICD-10-CM

## 2022-03-24 RX ORDER — CITALOPRAM HYDROBROMIDE 20 MG/1
20 TABLET ORAL DAILY
Qty: 30 TABLET | Refills: 0 | OUTPATIENT
Start: 2022-03-24

## 2022-03-24 ASSESSMENT — ANXIETY QUESTIONNAIRES: GAD7 TOTAL SCORE: 18

## 2022-03-28 ENCOUNTER — MYC MEDICAL ADVICE (OUTPATIENT)
Dept: INTERNAL MEDICINE | Facility: CLINIC | Age: 62
End: 2022-03-28
Payer: COMMERCIAL

## 2022-04-05 ENCOUNTER — MYC MEDICAL ADVICE (OUTPATIENT)
Dept: INTERNAL MEDICINE | Facility: CLINIC | Age: 62
End: 2022-04-05
Payer: COMMERCIAL

## 2022-04-05 DIAGNOSIS — F33.1 MODERATE EPISODE OF RECURRENT MAJOR DEPRESSIVE DISORDER (H): ICD-10-CM

## 2022-04-05 RX ORDER — CITALOPRAM HYDROBROMIDE 20 MG/1
20 TABLET ORAL DAILY
Qty: 30 TABLET | Refills: 0 | Status: CANCELLED | OUTPATIENT
Start: 2022-04-05

## 2022-04-05 RX ORDER — CITALOPRAM HYDROBROMIDE 20 MG/1
20 TABLET ORAL DAILY
Qty: 90 TABLET | Refills: 3 | Status: SHIPPED | OUTPATIENT
Start: 2022-04-05 | End: 2023-03-01

## 2022-04-05 NOTE — TELEPHONE ENCOUNTER
Patient enrolled in our Rx Med Sync service to improve adherence. We are requesting a refill authorization in advance to ensure an active prescription is on file.

## 2022-04-18 ENCOUNTER — HOSPITAL ENCOUNTER (OUTPATIENT)
Dept: ULTRASOUND IMAGING | Facility: CLINIC | Age: 62
Discharge: HOME | End: 2022-04-18
Attending: SURGERY
Payer: COMMERCIAL

## 2022-04-18 ENCOUNTER — OFFICE VISIT (OUTPATIENT)
Dept: OTHER | Facility: CLINIC | Age: 62
End: 2022-04-18
Attending: SURGERY
Payer: COMMERCIAL

## 2022-04-18 VITALS — DIASTOLIC BLOOD PRESSURE: 84 MMHG | SYSTOLIC BLOOD PRESSURE: 153 MMHG | HEART RATE: 60 BPM

## 2022-04-18 DIAGNOSIS — I65.21 CAROTID ARTERY STENOSIS, SYMPTOMATIC, RIGHT: ICD-10-CM

## 2022-04-18 DIAGNOSIS — I65.21 CAROTID ARTERY STENOSIS, SYMPTOMATIC, RIGHT: Primary | ICD-10-CM

## 2022-04-18 PROCEDURE — 93880 EXTRACRANIAL BILAT STUDY: CPT

## 2022-04-18 PROCEDURE — G0463 HOSPITAL OUTPT CLINIC VISIT: HCPCS | Mod: 25

## 2022-04-18 PROCEDURE — 99213 OFFICE O/P EST LOW 20 MIN: CPT | Performed by: SURGERY

## 2022-04-18 PROCEDURE — 93880 EXTRACRANIAL BILAT STUDY: CPT | Mod: 26 | Performed by: SURGERY

## 2022-04-18 NOTE — PROGRESS NOTES
[facility]        Patient is here for a  follow up.      Pt is currently taking Aspirin and Statin.    BP (!) 153/84 (BP Location: Right arm, Patient Position: Chair, Cuff Size: Adult Regular)   Pulse 60     The provider has been notified that the patient has no concerns.     Questions patient would like addressed today are: N/A.    Refills are needed: N/A    Has homecare services and agency name:  Mila Almeida MA

## 2022-04-18 NOTE — PROGRESS NOTES
Mr. Brewer is now 61 years old. He had a right moderate carotid stenosis which is symptomatic.  In February 2019 he underwent carotid endarterectomy.  He has no complaints.  He has not had any recurrent symptoms of transient ischemic attack or amaurosis fugax.  He continues to work making cabinets.    He underwent duplex sonography today and I reviewed it myself.  The right carotid endarterectomy site is widely patent.  There is no progression on the left.    He is presently on aspirin as well as Plavix.  I have asked him to stop taking the Plavix.  We will see him back in 1 year with carotid duplex sonography bilateral for surveillance.

## 2022-04-21 DIAGNOSIS — I65.21 CAROTID ARTERY STENOSIS, SYMPTOMATIC, RIGHT: Primary | ICD-10-CM

## 2022-05-20 DIAGNOSIS — E78.5 HYPERLIPIDEMIA LDL GOAL <100: ICD-10-CM

## 2022-05-20 DIAGNOSIS — I65.21 STENOSIS OF RIGHT CAROTID ARTERY: ICD-10-CM

## 2022-05-20 DIAGNOSIS — I10 BENIGN ESSENTIAL HYPERTENSION: ICD-10-CM

## 2022-05-23 RX ORDER — ATORVASTATIN CALCIUM 80 MG/1
TABLET, FILM COATED ORAL
Qty: 90 TABLET | Refills: 2 | Status: SHIPPED | OUTPATIENT
Start: 2022-05-23 | End: 2023-02-21

## 2022-05-23 RX ORDER — LISINOPRIL 10 MG/1
TABLET ORAL
Qty: 90 TABLET | Refills: 2 | Status: SHIPPED | OUTPATIENT
Start: 2022-05-23 | End: 2023-02-21

## 2022-05-23 RX ORDER — ASPIRIN 81 MG/1
TABLET, COATED ORAL
Qty: 90 TABLET | Refills: 2 | Status: SHIPPED | OUTPATIENT
Start: 2022-05-23 | End: 2023-02-21

## 2022-05-23 RX ORDER — METOPROLOL SUCCINATE 25 MG/1
TABLET, EXTENDED RELEASE ORAL
Qty: 90 TABLET | Refills: 2 | Status: SHIPPED | OUTPATIENT
Start: 2022-05-23 | End: 2023-02-21

## 2022-05-23 NOTE — TELEPHONE ENCOUNTER
Aspirin 81  lipitor  Prescription approved per [facility] Refill Protocol.    Lisinopril  Toprol XL  Routing refill request to provider for review/approval because:  BP failed RN protocol    Leslie Cardona RN

## 2022-10-09 ENCOUNTER — HEALTH MAINTENANCE LETTER (OUTPATIENT)
Age: 62
End: 2022-10-09

## 2023-02-19 DIAGNOSIS — I10 BENIGN ESSENTIAL HYPERTENSION: ICD-10-CM

## 2023-02-19 DIAGNOSIS — I65.21 STENOSIS OF RIGHT CAROTID ARTERY: ICD-10-CM

## 2023-02-19 DIAGNOSIS — E78.5 HYPERLIPIDEMIA LDL GOAL <100: ICD-10-CM

## 2023-02-21 RX ORDER — METOPROLOL SUCCINATE 25 MG/1
TABLET, EXTENDED RELEASE ORAL
Qty: 90 TABLET | Refills: 2 | Status: SHIPPED | OUTPATIENT
Start: 2023-02-21 | End: 2023-11-09

## 2023-02-21 RX ORDER — ATORVASTATIN CALCIUM 80 MG/1
TABLET, FILM COATED ORAL
Qty: 90 TABLET | Refills: 0 | Status: SHIPPED | OUTPATIENT
Start: 2023-02-21 | End: 2023-05-26

## 2023-02-21 RX ORDER — LISINOPRIL 10 MG/1
TABLET ORAL
Qty: 90 TABLET | Refills: 2 | Status: SHIPPED | OUTPATIENT
Start: 2023-02-21 | End: 2023-11-24

## 2023-02-21 RX ORDER — ASPIRIN 81 MG/1
TABLET, COATED ORAL
Qty: 90 TABLET | Refills: 0 | Status: SHIPPED | OUTPATIENT
Start: 2023-02-21 | End: 2023-05-26

## 2023-02-21 NOTE — TELEPHONE ENCOUNTER
Toprol XL  Zestril  Routing refill requests to provider for review/approval because:   Failed - Blood pressure under 140/90 in past 12 months    BP Readings from Last 3 Encounters:   04/18/22 (!) 153/84   03/23/22 (!) 142/84   02/17/21 132/80        Aspirin 81  lipitor  Prescription approved per [facility] Refill Protocol.    Leslie Cardona RN

## 2023-03-01 ENCOUNTER — OFFICE VISIT (OUTPATIENT)
Dept: INTERNAL MEDICINE | Facility: CLINIC | Age: 63
End: 2023-03-01
Payer: COMMERCIAL

## 2023-03-01 VITALS
BODY MASS INDEX: 26.06 KG/M2 | OXYGEN SATURATION: 100 % | HEIGHT: 67 IN | TEMPERATURE: 97.6 F | WEIGHT: 166 LBS | SYSTOLIC BLOOD PRESSURE: 124 MMHG | RESPIRATION RATE: 10 BRPM | DIASTOLIC BLOOD PRESSURE: 82 MMHG | HEART RATE: 68 BPM

## 2023-03-01 DIAGNOSIS — I65.21 STENOSIS OF RIGHT CAROTID ARTERY: Primary | ICD-10-CM

## 2023-03-01 DIAGNOSIS — F33.1 MODERATE EPISODE OF RECURRENT MAJOR DEPRESSIVE DISORDER (H): ICD-10-CM

## 2023-03-01 PROBLEM — I77.9 CAROTID ARTERY DISEASE (H): Status: RESOLVED | Noted: 2019-02-26 | Resolved: 2023-03-01

## 2023-03-01 PROCEDURE — 99213 OFFICE O/P EST LOW 20 MIN: CPT | Performed by: INTERNAL MEDICINE

## 2023-03-01 RX ORDER — BUPROPION HYDROCHLORIDE 300 MG/1
300 TABLET ORAL EVERY MORNING
Qty: 30 TABLET | Refills: 0 | Status: SHIPPED | OUTPATIENT
Start: 2023-03-01 | End: 2023-03-13

## 2023-03-01 ASSESSMENT — ANXIETY QUESTIONNAIRES
GAD7 TOTAL SCORE: 5
5. BEING SO RESTLESS THAT IT IS HARD TO SIT STILL: NOT AT ALL
6. BECOMING EASILY ANNOYED OR IRRITABLE: SEVERAL DAYS
2. NOT BEING ABLE TO STOP OR CONTROL WORRYING: NOT AT ALL
4. TROUBLE RELAXING: SEVERAL DAYS
GAD7 TOTAL SCORE: 5
1. FEELING NERVOUS, ANXIOUS, OR ON EDGE: SEVERAL DAYS
3. WORRYING TOO MUCH ABOUT DIFFERENT THINGS: SEVERAL DAYS
IF YOU CHECKED OFF ANY PROBLEMS ON THIS QUESTIONNAIRE, HOW DIFFICULT HAVE THESE PROBLEMS MADE IT FOR YOU TO DO YOUR WORK, TAKE CARE OF THINGS AT HOME, OR GET ALONG WITH OTHER PEOPLE: SOMEWHAT DIFFICULT
7. FEELING AFRAID AS IF SOMETHING AWFUL MIGHT HAPPEN: SEVERAL DAYS
8. IF YOU CHECKED OFF ANY PROBLEMS, HOW DIFFICULT HAVE THESE MADE IT FOR YOU TO DO YOUR WORK, TAKE CARE OF THINGS AT HOME, OR GET ALONG WITH OTHER PEOPLE?: SOMEWHAT DIFFICULT
GAD7 TOTAL SCORE: 5
7. FEELING AFRAID AS IF SOMETHING AWFUL MIGHT HAPPEN: SEVERAL DAYS

## 2023-03-01 ASSESSMENT — PATIENT HEALTH QUESTIONNAIRE - PHQ9
SUM OF ALL RESPONSES TO PHQ QUESTIONS 1-9: 5
10. IF YOU CHECKED OFF ANY PROBLEMS, HOW DIFFICULT HAVE THESE PROBLEMS MADE IT FOR YOU TO DO YOUR WORK, TAKE CARE OF THINGS AT HOME, OR GET ALONG WITH OTHER PEOPLE: SOMEWHAT DIFFICULT
SUM OF ALL RESPONSES TO PHQ QUESTIONS 1-9: 5

## 2023-03-01 NOTE — PROGRESS NOTES
I  Ankle bilaterally        Subjective   Melo is a 62 year old, presenting for the following health issues:  Recheck Medication (Celexa, sore feet and bowel issues)      History of Present Illness       Mental Health Follow-up:  Patient presents to follow-up on Anxiety.    Patient's anxiety since last visit has been:  Good  The patient is not having other symptoms associated with anxiety.  Any significant life events: No  Patient is not feeling anxious or having panic attacks.  Patient has no concerns about alcohol or drug use.    Reason for visit:  Meds  Symptom onset:  More than a month  Symptoms include:  Mild anger  Symptom intensity:  Severe  Symptom progression:  Staying the same  Had these symptoms before:  No  What makes it worse:  No  What makes it better:  No    He eats 2-3 servings of fruits and vegetables daily.He consumes 0 sweetened beverage(s) daily.He exercises with enough effort to increase his heart rate 9 or less minutes per day.  He exercises with enough effort to increase his heart rate 3 or less days per week.   He is taking medications regularly.    Today's PHQ-9         PHQ-9 Total Score: 5    PHQ-9 Q9 Thoughts of better off dead/self-harm past 2 weeks :   Not at all    How difficult have these problems made it for you to do your work, take care of things at home, or get along with other people: Somewhat difficult  Today's MICHAEL-7 Score: 5        EMR reviewed including:             Complaint, History of Chief Complaint, Corresponding Review of Systems, and Complaint Specific Physical Examination.    #1   Follow-up on depression and anxiety  Patient notes that the citalopram does not seem to work as well as it had in the past.  Notes that he has had no suicidal ideation or intent.  Sleeps adequately.  No significant weight change.  Notes that he is slightly more irritable than in the past.  Also notes that he has had diarrhea since starting the medication.  Notes that the diarrhea is almost  "daily.  Denies any melena or hematochezia.  Colonoscopies are up-to-date and unremarkable.        Exam:   PSYCH: The patient appears grossly appropriate. Maintains good eye contact, does not have any jittery or atypical motion. Displays appropriate affect.   GI: Abdomen is soft, without rebound, guarding or tenderness. Bowel sounds are appropriate. No renal bruits are heard.   HEART:  regular without rubs, clicks, gallops, or murmurs. PMI is nondisplaced. Upstrokes are brisk. S1,S2 are heard.   LUNGS: clear bilaterally, airflow is brisk, no intercostal retraction or stridor is noted. No coughing is noted during visit.        Patient has been interviewed, applicable history and applied review of systems have been performed.    Vital Signs:   /82   Pulse 68   Temp 97.6  F (36.4  C)   Resp 10   Ht 1.69 m (5' 6.54\")   Wt 75.3 kg (166 lb)   SpO2 100%   BMI 26.36 kg/m        Decision Making    Problem and Complexity    1. Moderate episode of recurrent major depressive disorder (H)  Recommend discontinue citalopram in favor Wellbutrin.  Trial course of 30 days.  Watch for side effects.  - buPROPion (WELLBUTRIN XL) 300 MG 24 hr tablet; Take 1 tablet (300 mg) by mouth every morning  Dispense: 30 tablet; Refill: 0    2. Stenosis of right carotid artery  Status post carotid endarterectomy with patency noted on recent Doppler studies.                                    FOLLOW UP   I have asked the patient to make an appointment for followup with either:  1.  Me,  2.  The patient's preferred provider,  3.  Any available provider  In 4 months for physical examination and yearly follow-up.        Regarding routine vaccinations:  I have reviewed the patient's vaccination schedule and discussed the benefits of prophylactic vaccination in detail.  I recommend the patient contact their pharmacist (not the pharmacy within the clinic) for vaccinations.  Discussed that most insurance companies now favor reimbursement to the " pharmacies and it will financially behoove the patient to have vaccinations performed at their pharmacy.        I have carefully explained the diagnosis and treatment options to the patient.  The patient has displayed an understanding of the above, and all subsequent questions were answered.      DO LORENZO Aguilar    Portions of this note were produced using [employer]  Although every attempt at real-time proof reading has been made, occasional grammar/syntax errors may have been missed.

## 2023-03-09 ENCOUNTER — MYC MEDICAL ADVICE (OUTPATIENT)
Dept: INTERNAL MEDICINE | Facility: CLINIC | Age: 63
End: 2023-03-09
Payer: COMMERCIAL

## 2023-03-09 DIAGNOSIS — F33.1 MODERATE EPISODE OF RECURRENT MAJOR DEPRESSIVE DISORDER (H): ICD-10-CM

## 2023-03-09 NOTE — TELEPHONE ENCOUNTER
Pending Prescriptions:                       Disp   Refills    buPROPion (WELLBUTRIN XL) 300 MG 24 hr tab*30 tab*0        Sig: Take 1 tablet (300 mg) by mouth every morning      Routing refill request to provider for review/approval because:  Phq9 is out of range    Laxmi Corona RN on 3/9/2023 at 5:33 PM

## 2023-03-13 RX ORDER — BUPROPION HYDROCHLORIDE 300 MG/1
300 TABLET ORAL EVERY MORNING
Qty: 30 TABLET | Refills: 0 | Status: SHIPPED | OUTPATIENT
Start: 2023-03-13 | End: 2023-03-23

## 2023-03-19 ENCOUNTER — MYC MEDICAL ADVICE (OUTPATIENT)
Dept: INTERNAL MEDICINE | Facility: CLINIC | Age: 63
End: 2023-03-19
Payer: COMMERCIAL

## 2023-03-20 ENCOUNTER — TELEPHONE (OUTPATIENT)
Dept: INTERNAL MEDICINE | Facility: CLINIC | Age: 63
End: 2023-03-20
Payer: COMMERCIAL

## 2023-03-20 DIAGNOSIS — F33.1 MAJOR DEPRESSIVE DISORDER, RECURRENT EPISODE, MODERATE (H): Primary | ICD-10-CM

## 2023-03-20 RX ORDER — ESCITALOPRAM OXALATE 10 MG/1
10 TABLET ORAL DAILY
Qty: 30 TABLET | Refills: 3 | Status: SHIPPED | OUTPATIENT
Start: 2023-03-20 | End: 2023-05-26

## 2023-03-20 RX ORDER — CITALOPRAM HYDROBROMIDE 10 MG/1
10 TABLET ORAL DAILY
Qty: 30 TABLET | Refills: 0 | Status: CANCELLED | OUTPATIENT
Start: 2023-03-20

## 2023-03-20 NOTE — TELEPHONE ENCOUNTER
Have him try lexapro 10mg instead. Very similar to Celexa but different so may be not the side effects.  Eventually could go to 20mg if needed but try the 10mg for 4-6 weeks first.

## 2023-03-20 NOTE — TELEPHONE ENCOUNTER
Please check with him and make sure that he did not have symptoms of withdrawal from the citalopram.  Make sure he weaned that down if he has been off it 3 weeks is it still giving him trouble without it.    He could go on the Wellbutrin and citalopram together if he wants to go back on that maybe that would help better than one of them individually.

## 2023-03-20 NOTE — TELEPHONE ENCOUNTER
Phoned patient's wife back.  Wife verbalized that patient stated Wellbutrin was making him feel very angry, frustrated and like a wound up rubber band.  Patient's wife stated patient's mood was not good yesterday.  She stated after stopping the Celexa and starting the Wellbutrin the first couple days were ok, but since last night, Wellbutrin is not a good match for patient at all.    Patient's wife stated the Celexa was giving patient increasing worsening diarrhea, but it was very helpful with his mood.    Patient's wife stated patient and herself do not want to continue the Wellbutrin and need something right away to replace medication.  Patient's wife discussed with RN regarding half dose of Celexa at least to start patient on medication -prior to working with PCP with different choices and options to relieve the side effects of the Celexa or trying other medications.    Will forward to Dr. Marcelo MD for review.  Pended Celexa at 10 mg at this time per provider approval.    Patient's wife is requesting a call today for message reply.    Leslie Cardona RN

## 2023-03-20 NOTE — TELEPHONE ENCOUNTER
Please advise in the absence of patients provider. Shirlene Coe LPN    FYI - Status Update:   NEW MED   WELLBUTRIN    Who is Calling: family member, spouse calling for patient    Update: Patient is having worsening moods, short tempered, increase in angry outbursts, does not like how he feels on this medication now.    Was previously on Celexa and was switched over to the Wellbutrin due to issues with Celexa.  Now having issues with the Wellbutrin that was started on 3/1/23.    Seen his primary on 3/1/23, meds were changed on 3/1/23    Does caller want a call/response back: Yes     Could we send this information to you in [employer] or would you prefer to receive a phone call?:   Patient would prefer a phone call   Okay to leave a detailed message?: Yes at Cell number on file:    Telephone Information:   Mobile [phone]

## 2023-04-20 ENCOUNTER — PATIENT OUTREACH (OUTPATIENT)
Dept: CARE COORDINATION | Facility: CLINIC | Age: 63
End: 2023-04-20
Payer: COMMERCIAL

## 2023-05-21 ENCOUNTER — HEALTH MAINTENANCE LETTER (OUTPATIENT)
Age: 63
End: 2023-05-21

## 2023-05-26 DIAGNOSIS — E78.5 HYPERLIPIDEMIA LDL GOAL <100: ICD-10-CM

## 2023-05-26 DIAGNOSIS — F33.1 MAJOR DEPRESSIVE DISORDER, RECURRENT EPISODE, MODERATE (H): ICD-10-CM

## 2023-05-26 DIAGNOSIS — I65.21 STENOSIS OF RIGHT CAROTID ARTERY: ICD-10-CM

## 2023-05-26 RX ORDER — ESCITALOPRAM OXALATE 10 MG/1
10 TABLET ORAL DAILY
Qty: 30 TABLET | Refills: 3 | Status: SHIPPED | OUTPATIENT
Start: 2023-05-26 | End: 2023-09-26

## 2023-05-26 RX ORDER — ASPIRIN 81 MG/1
TABLET, DELAYED RELEASE ORAL
Qty: 90 TABLET | Refills: 3 | Status: SHIPPED | OUTPATIENT
Start: 2023-05-26 | End: 2024-05-16

## 2023-05-26 RX ORDER — ATORVASTATIN CALCIUM 80 MG/1
TABLET, FILM COATED ORAL
Qty: 90 TABLET | Refills: 0 | Status: SHIPPED | OUTPATIENT
Start: 2023-05-26 | End: 2023-08-25

## 2023-05-26 NOTE — TELEPHONE ENCOUNTER
Prescription approved per [facility] Refill Protocol.  Laxmi Corona RN on 5/26/2023 at 12:48 PM

## 2023-05-26 NOTE — TELEPHONE ENCOUNTER
"Routing refill request to provider for review/approval because:    Requested Prescriptions   Pending Prescriptions Disp Refills    escitalopram (LEXAPRO) 10 MG tablet [Pharmacy Med Name: ESCITALOPRAM 10MG TABLET] 30 tablet 3     Sig: Take 1 tablet (10 mg) by mouth daily       SS[facility] Protocol Failed - 5/26/2023  1:13 AM        Failed - PHQ-9 score less than 5 in past 6 months     Please review last PHQ-9 score.           Passed - Medication is active on med list        Passed - Patient is age 18 or older        Passed - Recent (6 mo) or future (30 days) visit within the authorizing provider's specialty     Patient had office visit in the last 6 months or has a visit in the next 30 days with authorizing provider or within the authorizing provider's specialty.  See \"Patient Info\" tab in inbasket, or \"Choose Columns\" in Meds & Orders section of the refill encounter.                       "

## 2023-05-26 NOTE — TELEPHONE ENCOUNTER
Pending Prescriptions:                       Disp   Refills    atorvastatin (LIPITOR) 80 MG tablet [Pharm*90 tab*0        Sig: TAKE 1 TABLET BY MOUTH EVERY DAY    Signed Prescriptions:                        Disp   Refills    SM ASPIRIN LOW DOSE 81 MG EC tablet        90 tab*3        Sig: TAKE 1 TABLET (81 MG) BY MOUTH DAILY  Authorizing Provider: KAMINI GRAY  Ordering User: JERE CORONA      Routing refill request to provider for review/approval because:  Labs not current:  ldl    Jere Corona RN on 5/26/2023 at 12:48 PM

## 2023-07-24 ENCOUNTER — HOSPITAL ENCOUNTER (OUTPATIENT)
Dept: ULTRASOUND IMAGING | Facility: CLINIC | Age: 63
Discharge: HOME | End: 2023-07-24
Attending: SURGERY
Payer: COMMERCIAL

## 2023-07-24 ENCOUNTER — OFFICE VISIT (OUTPATIENT)
Dept: OTHER | Facility: CLINIC | Age: 63
End: 2023-07-24
Attending: SURGERY
Payer: COMMERCIAL

## 2023-07-24 VITALS — OXYGEN SATURATION: 98 % | SYSTOLIC BLOOD PRESSURE: 125 MMHG | DIASTOLIC BLOOD PRESSURE: 74 MMHG | HEART RATE: 57 BPM

## 2023-07-24 DIAGNOSIS — I65.21 CAROTID ARTERY STENOSIS, SYMPTOMATIC, RIGHT: ICD-10-CM

## 2023-07-24 DIAGNOSIS — I65.21 SYMPTOMATIC CAROTID ARTERY STENOSIS, RIGHT: Primary | ICD-10-CM

## 2023-07-24 PROCEDURE — 99213 OFFICE O/P EST LOW 20 MIN: CPT | Mod: 25 | Performed by: SURGERY

## 2023-07-24 PROCEDURE — 99213 OFFICE O/P EST LOW 20 MIN: CPT | Performed by: SURGERY

## 2023-07-24 PROCEDURE — G0463 HOSPITAL OUTPT CLINIC VISIT: HCPCS | Mod: 25

## 2023-07-24 PROCEDURE — 93880 EXTRACRANIAL BILAT STUDY: CPT

## 2023-07-24 PROCEDURE — 93880 EXTRACRANIAL BILAT STUDY: CPT | Mod: 26 | Performed by: SURGERY

## 2023-07-24 NOTE — PROGRESS NOTES
(ULTRSOUND CARTOID BILATERAL @[facility] )History of Right carotid endarterectomy on 2/26/19; 1 year follow up to 4/18/22 appointment with Dr. Ramos.     /74 (BP Location: Left arm)   Pulse 57   SpO2 98%     Christina Alfred RN

## 2023-07-25 NOTE — PROGRESS NOTES
Mr Melo Brewer is a 63-year-old.  In February 2019 we did a right carotid endarterectomy for amaurosis fugax of the right eye.  He has not had any problems since that time and continues to do quite well.  His duplex sonography shows patent right carotid endarterectomy site and no progression of disease on the opposite side.  I showed him the images and I have reassured him of the same.  We will see him back in 1 year.

## 2023-07-27 DIAGNOSIS — I65.21 CAROTID ARTERY STENOSIS, SYMPTOMATIC, RIGHT: Primary | ICD-10-CM

## 2023-07-27 DIAGNOSIS — I65.29 STENOSIS OF CAROTID ARTERY, UNSPECIFIED LATERALITY: ICD-10-CM

## 2023-08-24 DIAGNOSIS — E78.5 HYPERLIPIDEMIA LDL GOAL <100: ICD-10-CM

## 2023-08-25 RX ORDER — ATORVASTATIN CALCIUM 80 MG/1
TABLET, FILM COATED ORAL
Qty: 90 TABLET | Refills: 0 | Status: SHIPPED | OUTPATIENT
Start: 2023-08-25 | End: 2023-11-24

## 2023-09-26 DIAGNOSIS — F33.1 MAJOR DEPRESSIVE DISORDER, RECURRENT EPISODE, MODERATE (H): ICD-10-CM

## 2023-09-26 RX ORDER — ESCITALOPRAM OXALATE 10 MG/1
10 TABLET ORAL DAILY
Qty: 30 TABLET | Refills: 3 | Status: SHIPPED | OUTPATIENT
Start: 2023-09-26 | End: 2024-01-29

## 2023-10-26 DIAGNOSIS — I10 BENIGN ESSENTIAL HYPERTENSION: ICD-10-CM

## 2023-10-26 RX ORDER — METOPROLOL SUCCINATE 25 MG/1
TABLET, EXTENDED RELEASE ORAL
Qty: 90 TABLET | Refills: 2 | OUTPATIENT
Start: 2023-10-26

## 2023-11-09 DIAGNOSIS — I10 BENIGN ESSENTIAL HYPERTENSION: ICD-10-CM

## 2023-11-10 RX ORDER — METOPROLOL SUCCINATE 25 MG/1
TABLET, EXTENDED RELEASE ORAL
Qty: 90 TABLET | Refills: 1 | Status: SHIPPED | OUTPATIENT
Start: 2023-11-10 | End: 2024-05-16

## 2023-11-24 DIAGNOSIS — E78.5 HYPERLIPIDEMIA LDL GOAL <100: ICD-10-CM

## 2023-11-24 DIAGNOSIS — I10 BENIGN ESSENTIAL HYPERTENSION: ICD-10-CM

## 2023-11-24 RX ORDER — LISINOPRIL 10 MG/1
TABLET ORAL
Qty: 90 TABLET | Refills: 2 | Status: SHIPPED | OUTPATIENT
Start: 2023-11-24 | End: 2024-09-09

## 2023-11-24 RX ORDER — ATORVASTATIN CALCIUM 80 MG/1
TABLET, FILM COATED ORAL
Qty: 90 TABLET | Refills: 0 | Status: SHIPPED | OUTPATIENT
Start: 2023-11-24 | End: 2023-12-12

## 2023-12-11 DIAGNOSIS — E78.5 HYPERLIPIDEMIA LDL GOAL <100: ICD-10-CM

## 2023-12-12 RX ORDER — ATORVASTATIN CALCIUM 80 MG/1
TABLET, FILM COATED ORAL
Qty: 90 TABLET | Refills: 0 | Status: SHIPPED | OUTPATIENT
Start: 2023-12-12 | End: 2024-04-22

## 2024-01-20 ENCOUNTER — HOSPITAL ENCOUNTER (EMERGENCY)
Facility: CLINIC | Age: 64
Discharge: HOME | End: 2024-01-20
Attending: PHYSICIAN ASSISTANT | Admitting: PHYSICIAN ASSISTANT
Payer: COMMERCIAL

## 2024-01-20 VITALS
SYSTOLIC BLOOD PRESSURE: 180 MMHG | OXYGEN SATURATION: 96 % | DIASTOLIC BLOOD PRESSURE: 98 MMHG | HEART RATE: 18 BPM | HEIGHT: 66 IN | BODY MASS INDEX: 27.32 KG/M2 | RESPIRATION RATE: 18 BRPM | TEMPERATURE: 98 F | WEIGHT: 170 LBS

## 2024-01-20 DIAGNOSIS — M54.16 LUMBAR RADICULOPATHY: ICD-10-CM

## 2024-01-20 DIAGNOSIS — M79.602 PAIN OF LEFT UPPER EXTREMITY: ICD-10-CM

## 2024-01-20 PROCEDURE — 250N000012 HC RX MED GY IP 250 OP 636 PS 637: Performed by: PHYSICIAN ASSISTANT

## 2024-01-20 PROCEDURE — 99284 EMERGENCY DEPT VISIT MOD MDM: CPT | Performed by: PHYSICIAN ASSISTANT

## 2024-01-20 PROCEDURE — 96372 THER/PROPH/DIAG INJ SC/IM: CPT | Performed by: PHYSICIAN ASSISTANT

## 2024-01-20 PROCEDURE — 250N000011 HC RX IP 250 OP 636: Performed by: PHYSICIAN ASSISTANT

## 2024-01-20 RX ORDER — KETOROLAC TROMETHAMINE 30 MG/ML
30 INJECTION, SOLUTION INTRAMUSCULAR; INTRAVENOUS ONCE
Status: COMPLETED | OUTPATIENT
Start: 2024-01-20 | End: 2024-01-20

## 2024-01-20 RX ORDER — OXYCODONE HYDROCHLORIDE 5 MG/1
5 TABLET ORAL EVERY 6 HOURS PRN
Qty: 12 TABLET | Refills: 0 | Status: SHIPPED | OUTPATIENT
Start: 2024-01-20

## 2024-01-20 RX ORDER — DIAZEPAM 10 MG/2ML
2.5 INJECTION, SOLUTION INTRAMUSCULAR; INTRAVENOUS ONCE
Status: COMPLETED | OUTPATIENT
Start: 2024-01-20 | End: 2024-01-20

## 2024-01-20 RX ORDER — CYCLOBENZAPRINE HCL 10 MG
10 TABLET ORAL 3 TIMES DAILY PRN
Qty: 15 TABLET | Refills: 0 | Status: SHIPPED | OUTPATIENT
Start: 2024-01-20 | End: 2024-01-26

## 2024-01-20 RX ORDER — METHYLPREDNISOLONE 4 MG
TABLET, DOSE PACK ORAL
Qty: 21 TABLET | Refills: 0 | Status: SHIPPED | OUTPATIENT
Start: 2024-01-20 | End: 2024-02-23

## 2024-01-20 RX ADMIN — PREDNISONE 50 MG: 10 TABLET ORAL at 12:46

## 2024-01-20 RX ADMIN — DIAZEPAM 2.5 MG: 10 INJECTION, SOLUTION INTRAMUSCULAR; INTRAVENOUS at 12:48

## 2024-01-20 RX ADMIN — HYDROMORPHONE HYDROCHLORIDE 1 MG: 1 INJECTION, SOLUTION INTRAMUSCULAR; INTRAVENOUS; SUBCUTANEOUS at 12:46

## 2024-01-20 RX ADMIN — KETOROLAC TROMETHAMINE 30 MG: 30 INJECTION, SOLUTION INTRAMUSCULAR; INTRAVENOUS at 12:46

## 2024-01-20 ASSESSMENT — ACTIVITIES OF DAILY LIVING (ADL): ADLS_ACUITY_SCORE: 35

## 2024-01-20 NOTE — ED TRIAGE NOTES
Pt states he had lower back pain that began a few weeks ago but over last two days left side has become unbearable and now radiates through entire left side: neck, shoulder, arm, hip, and through to knee.      Triage Assessment (Adult)       Row Name 01/20/24 1206          Triage Assessment    Airway WDL WDL        Respiratory WDL    Respiratory WDL WDL        Cardiac WDL    Cardiac WDL WDL

## 2024-01-20 NOTE — LETTER
January 20, 2024      To Whom It May Concern:      Melo Brewer was seen in our Emergency Department today, 01/20/24.  I expect his condition to improve over the next 2-3 weeks.  He may return to work when improved.  For the next 1 week, he is unable to bend, lift, or twist due to his low back condition.  He will be seen in the clinic for follow-up in 1 week to determine his workability after that.  Please excuse him.      Sincerely,            Veto Scanlon PA-C

## 2024-01-20 NOTE — DISCHARGE INSTRUCTIONS
It was a pleasure working with you today!  I hope your condition improves rapidly!     Please REST!  I would try heat on your back for 20 minutes or couple hours.  You could switch to ice if that feels better.  Start your first dose of the Medrol Dosepak tomorrow as you were given a dose here in the emergency department.  You can use ibuprofen 600 mg every 6 hours as needed for breakthrough pain.  You can also use Tylenol 1000 mg every 6 hours as needed on top of that.  Reserve the oxycodone for severe breakthrough pain.  Do not drive a vehicle or operate machinery for 8 hours after taking oxycodone, as that medication can impair your judgment.  Use the cyclobenzaprine as needed for muscle spasm.    Call Monday morning the spine clinic to see if you can get a 1 week recheck appointment.  If not, then contact your primary for 1 week recheck appointment.

## 2024-01-20 NOTE — ED PROVIDER NOTES
History     Chief Complaint   Patient presents with    Back Pain     HPI  Melo Brewer is a 63 year old male who presents for evaluation of increasing left low back pain for the past 2 weeks.  Much worse in the past 3 days.  Today, his pain is rated 10 on a scale of 10 and he states that he just cannot take it anymore.  Has been trying Tylenol, ibuprofen, and hemp cream.  He describes it as a stabbing pain starting the left low back and feels like it radiates down to his knee area.  Does not have any clicking, locking, or giving way of the knee.  No recent injury or fall.  Denies any numbness or tingling of the left lower extremity but he does describe off-and-on numbness and tingling of his great toe over the past 3 days.  Also has had off-and-on left arm discomfort for the past 3-4 weeks without known injury.  He states it is kind of a deep aching pain which is more centralized in the shoulder but does radiate down to the elbow at times.  Seems to be worse if he lifts anything.  Better with rest.  Does not have any ongoing pain.  Denies any chest pain or dyspnea.  Denies any coolness of the extremity.  Does have a history of cervical microdiscectomy procedure in the past.  I did review his MRI scans from 2009 and he does have multilevel disc disease with foraminal stenosis noted.  Denies any fever, chills, loss of bowel/bladder function, abdominal pain, rashes, or saddle anesthesia.    Allergies:  Allergies   Allergen Reactions    Apple Juice Anaphylaxis    Bees Anaphylaxis       Problem List:    Patient Active Problem List    Diagnosis Date Noted    Carotid artery stenosis 02/26/2019     Priority: Medium    CARDIOVASCULAR SCREENING; LDL GOAL LESS THAN 130 10/31/2010     Priority: Medium        Past Medical History:    History reviewed. No pertinent past medical history.    Past Surgical History:    Past Surgical History:   Procedure Laterality Date    BACK SURGERY      COLONOSCOPY N/A 5/19/2017    Procedure:  "COLONOSCOPY;  Colonoscopy;  Surgeon: Robert Elmore MD;  Location: [facility]    ENDARTERECTOMY CAROTID Right 2/26/2019    Procedure: RIGHT CAROTID ENDARTERECTOMY WITH EEG AND INTRAOPERATIVE ULTRASOUND;  Surgeon: Sae Ramos MD;  Location: [facility]    HC EXCIS PRIMARY GANGLION WRIST  12/23/2004    Left wrist    HC INJ EPIDURAL LUMBAR/SACRAL W/WO CONTRAST  2009    HC REMOVAL OF TONSILS,<11 Y/O  1967    Tonsils <13y.o.    SURGICAL HISTORY OF -   4/24/2009    Left C6-C7 Microdiskectomy.       Family History:    Family History   Problem Relation Age of Onset    Hypertension Father         on medication HTN    Hyperlipidemia Father     Hypertension Brother        Social History:  Marital Status:   [2]  Social History     Tobacco Use    Smoking status: Former     Packs/day: 0.50     Years: 15.00     Additional pack years: 0.00     Total pack years: 7.50     Types: Cigarettes    Smokeless tobacco: Never   Substance Use Topics    Alcohol use: Yes     Comment: 1-2 daily    Drug use: No        Medications:    cyclobenzaprine (FLEXERIL) 10 MG tablet  methylPREDNISolone (MEDROL DOSEPAK) 4 MG tablet therapy pack  oxyCODONE (ROXICODONE) 5 MG tablet  acetaminophen (TYLENOL) 650 MG CR tablet  atorvastatin (LIPITOR) 80 MG tablet  escitalopram (LEXAPRO) 10 MG tablet  ibuprofen (ADVIL/MOTRIN) 200 MG tablet  lisinopril (ZESTRIL) 10 MG tablet  metoprolol succinate ER (TOPROL XL) 25 MG 24 hr tablet  SM ASPIRIN LOW DOSE 81 MG EC tablet          Review of Systems   All other systems reviewed and are negative.      Physical Exam   BP: (!) 157/106 (did not take AM BP meds)  Pulse: 112  Temp: 98  F (36.7  C)  Resp: 18  Height: 167.6 cm (5' 6\")  Weight: 77.1 kg (170 lb)  SpO2: 100 %      Physical Exam  Vitals and nursing note reviewed.   Constitutional:       General: He is not in acute distress.     Appearance: He is not diaphoretic.      Comments: Appears to be in discomfort.  Laying on his right side.  Slow with any movement. "   HENT:      Head: Normocephalic and atraumatic.      Right Ear: External ear normal.      Left Ear: External ear normal.      Nose: Nose normal.      Mouth/Throat:      Pharynx: No oropharyngeal exudate.   Eyes:      General: No scleral icterus.        Right eye: No discharge.         Left eye: No discharge.      Conjunctiva/sclera: Conjunctivae normal.      Pupils: Pupils are equal, round, and reactive to light.   Neck:      Thyroid: No thyromegaly.   Cardiovascular:      Rate and Rhythm: Normal rate and regular rhythm.      Heart sounds: Normal heart sounds. No murmur heard.  Pulmonary:      Effort: Pulmonary effort is normal. No respiratory distress.      Breath sounds: Normal breath sounds. No wheezing or rales.   Chest:      Chest wall: No tenderness.   Abdominal:      General: Bowel sounds are normal. There is no distension.      Palpations: Abdomen is soft. There is no mass.      Tenderness: There is no abdominal tenderness. There is no guarding or rebound.   Musculoskeletal:         General: No deformity.      Cervical back: Normal range of motion and neck supple.      Comments: Lumbosacral spine area reveals no mass but there is tenderness of the paravertebral muscles on the left. Limited and painful lumbosacral range of motion is noted. Straight leg raise is positive at 45 degrees on the left side. DTR's, motor strength and sensation normal, including heel and toe gait.  Peripheral pulses are palpable. Hips and knees have full range of motion without pain. No abdominal tenderness, mass or organomegaly.      Left knee exam normal.  No sign of swelling.  No joint line tenderness.  Ligamentous testing intact.  Meniscal testing negative.  I could not recreate any knee pain.  No calf tenderness.  Negative Homans' sign.  Distal pulses 2+.  Sensation intact to light touch.    Left upper extremity exam with no acute abnormality.  He does not have any tenderness throughout.  He has onset of pain with forced  external rotation and flexion.  No weakness.  Appears to be more shoulder origin and may be rotator cuff.  DTRs 2-4 without clonus.   Lymphadenopathy:      Cervical: No cervical adenopathy.   Skin:     General: Skin is warm and dry.      Capillary Refill: Capillary refill takes less than 2 seconds.      Findings: No erythema or rash.   Neurological:      Mental Status: He is alert and oriented to person, place, and time.      Cranial Nerves: No cranial nerve deficit.   Psychiatric:         Behavior: Behavior normal.         Thought Content: Thought content normal.         ED Course                 Procedures              Critical Care time:  none               No results found for this or any previous visit (from the past 24 hour(s)).    Medications   HYDROmorphone (DILAUDID) injection 1 mg (1 mg Intramuscular $Given 1/20/24 1246)   ketorolac (TORADOL) injection 30 mg (30 mg Intramuscular $Given 1/20/24 1246)   predniSONE (DELTASONE) tablet 50 mg (50 mg Oral $Given 1/20/24 1246)   diazepam (VALIUM) injection 2.5 mg (2.5 mg Intramuscular $Given 1/20/24 1248)       Assessments & Plan (with Medical Decision Making)     Lumbar radiculopathy  Pain of left upper extremity     63 year old male presents for evaluation of left arm and low back pain.  Increasing over the past 3-4 weeks.  No acute injury or fall.  No red flag symptoms.  No loss of bowel/bladder function.  No saddle anesthesia.  Pain is primarily in the left back radiating down to the level of the knee without numbness or tingling in the left lower extremity but he does have some numbness and tingling in the right great toe off-and-on.  Left arm pain primarily with lifting anything and describes it as aching.  Better with rest.  No ongoing discomfort.  Denies any chest pain or dyspnea.  Does have a history of cervical microdiscectomy procedure in the past.  MRI from 2009 of the cervical and and lumbar spine with multilevel degenerative disc disease and bulging  disc with areas of foraminal stenosis.  Exam with blood pressure 180/98, temperature 98.0, pulse was 80 when I saw the patient, respiration 18, oxygen saturation 96% on room air.  He does not appear to be in pain.  Has pain with any range of motion of his back.  Tender to palpation of the left lower back area.  Positive SLR at about 45 degrees.  DTRs, sensation, and strength intact to the lower extremities.  Negative Homans' sign.  No palpable cord in the calf.  Left upper extremity with no tenderness palpated.  He does have pain with forced flexion and external rotation of the shoulder.  No elbow tenderness.  Cardiopulmonary exam normal.  Patient was given IM Dilaudid, IM Toradol, IM Valium, and p.o. prednisone.  1 hour after administration of these medications, he felt much improved.  His pain was down to 1 on a scale of 10.  He is very pleased with the results.  Imaging was not performed given that patient has not had any recent trauma.  Review of previous MRIs displays that he does have a fair amount of degenerative disease and foraminal stenosis of which I think he is experiencing acute exacerbation in the left low back area.  I am concerned that the shoulder and arm pain is more related to rotator cuff tendinitis versus other.  Certainly could be having some radicular symptoms as well.  Will see how he progresses with conservative management.  We discussed rest.  No bending, lifting, or twisting for the next week.  Note provided for work.  Start a Medrol Dosepak.  Flexeril as needed for muscle spasm.  Oxycodone as needed for breakthrough pain.  Do not drive for 8 hours given the sedation side effect.  Proper dosing for ibuprofen and Tylenol reviewed with him.  I placed a referral for the spine clinic for 1 week follow-up.  If he is not able to see them in a week, then he should call and schedule with his primary.  Indications for ED return reviewed with him in detail.  Patient was in agreement with this plan  and was suitable for discharge.     I have reviewed the nursing notes.    I have reviewed the findings, diagnosis, plan and need for follow up with the patient.           Medical Decision Making  The patient's presentation was of moderate complexity (an acute complicated injury).    The patient's evaluation involved:  history and exam without other MDM data elements    The patient's management necessitated high risk (a parenteral controlled substance).        Discharge Medication List as of 1/20/2024  2:09 PM        START taking these medications    Details   cyclobenzaprine (FLEXERIL) 10 MG tablet Take 1 tablet (10 mg) by mouth 3 times daily as needed for muscle spasms, Disp-15 tablet, R-0, E-Prescribe      methylPREDNISolone (MEDROL DOSEPAK) 4 MG tablet therapy pack Follow package directions., Disp-21 tablet, R-0, E-Prescribe      oxyCODONE (ROXICODONE) 5 MG tablet Take 1 tablet (5 mg) by mouth every 6 hours as needed, Disp-12 tablet, R-0, E-Prescribe             Final diagnoses:   Lumbar radiculopathy   Pain of left upper extremity     Disclaimer: This note consists of symbols derived from keyboarding, dictation and/or voice recognition software. As a result, there may be errors in the script that have gone undetected. Please consider this when interpreting information found in this chart.      1/20/2024   [facility] EMERGENCY DEPT       Veto Scanlon PA-C  01/20/24 7779     [FreeTextEntry1] : Blood work results, imaging studies, and pathology reports reviewed in detail with patient .\par \par 7/24/2023:\par WBC 1.87, hemoglobin 10.5 g/dL, hematocrit 28.8%, platelet 154,000

## 2024-01-27 DIAGNOSIS — F33.1 MAJOR DEPRESSIVE DISORDER, RECURRENT EPISODE, MODERATE (H): ICD-10-CM

## 2024-01-29 ENCOUNTER — OFFICE VISIT (OUTPATIENT)
Dept: PHYSICAL MEDICINE AND REHAB | Facility: CLINIC | Age: 64
End: 2024-01-29
Attending: PHYSICIAN ASSISTANT
Payer: COMMERCIAL

## 2024-01-29 VITALS
WEIGHT: 165.3 LBS | HEART RATE: 96 BPM | BODY MASS INDEX: 26.57 KG/M2 | SYSTOLIC BLOOD PRESSURE: 138 MMHG | HEIGHT: 66 IN | DIASTOLIC BLOOD PRESSURE: 82 MMHG | OXYGEN SATURATION: 99 %

## 2024-01-29 DIAGNOSIS — M51.369 DEGENERATION OF LUMBAR INTERVERTEBRAL DISC: ICD-10-CM

## 2024-01-29 DIAGNOSIS — M79.602 PAIN OF LEFT UPPER EXTREMITY: ICD-10-CM

## 2024-01-29 DIAGNOSIS — G89.29 CHRONIC PAIN OF RIGHT KNEE: Primary | ICD-10-CM

## 2024-01-29 DIAGNOSIS — M54.16 LUMBAR RADICULOPATHY: ICD-10-CM

## 2024-01-29 DIAGNOSIS — M25.561 CHRONIC PAIN OF RIGHT KNEE: Primary | ICD-10-CM

## 2024-01-29 PROCEDURE — 99204 OFFICE O/P NEW MOD 45 MIN: CPT | Performed by: STUDENT IN AN ORGANIZED HEALTH CARE EDUCATION/TRAINING PROGRAM

## 2024-01-29 RX ORDER — GABAPENTIN 300 MG/1
300 CAPSULE ORAL AT BEDTIME
Qty: 30 CAPSULE | Refills: 0 | Status: SHIPPED | OUTPATIENT
Start: 2024-01-29 | End: 2024-02-06

## 2024-01-29 RX ORDER — ESCITALOPRAM OXALATE 10 MG/1
10 TABLET ORAL DAILY
Qty: 30 TABLET | Refills: 3 | Status: SHIPPED | OUTPATIENT
Start: 2024-01-29 | End: 2024-06-17

## 2024-01-29 ASSESSMENT — PAIN SCALES - GENERAL: PAINLEVEL: WORST PAIN (10)

## 2024-01-29 NOTE — PATIENT INSTRUCTIONS
~Spine Center Scheduling #[phone].  ~Please call our [facility] Spine Nurse Navigation #[phone] with any questions or concerns about your treatment plan, if symptoms worsen and you would like to be seen urgently, or if you have problems controlling bladder and bowel function.  ~For any future flareups or new symptoms, recommend follow-up in clinic or contact the nurse navigator line.  ~Please note that any My Chart messages may take multiple days for a response due to the high volume of patients seen in clinic.  Anything sent Friday night or after will be answered the following week when able.     Imaging has been ordered. Radiology will call you to schedule. Please call below if you do not hear from them in the next couple of days.     [facility] Radiology Scheduling    Please call [phone] to schedule your image(s) (select option #1). There are 3 different locations, see below.     [facility]  [address] [place] [address] Imaging - [place]  [address], [address]   [facility] [address]    [place] [id number] [place] [address]

## 2024-01-29 NOTE — LETTER
January 29, 2024      Melo Brewer  [address] [facility] [address]        To Whom It May Concern:    Melo Brewer was seen in our clinic. He is recommended to stay out of work due to back pain until he is re-assessed in our clinic with updated imaging.      Sincerely,      Andrew Lopez

## 2024-01-29 NOTE — PROGRESS NOTES
ASSESSMENT:  Melo Brewer is a 63 year old male presents for consultation at the request of Veto Scanlon who presents today for new patient evaluation of :         Diagnoses and all orders for this visit:  Chronic pain of right knee  -     XR Knee Bilateral 3 Views; Future  Lumbar radiculopathy  -     Spine  Referral  -     XR Lumbar Spine w Bending Comp G/E 6 Views; Future  -     gabapentin (NEURONTIN) 300 MG capsule; Take 1 capsule (300 mg) by mouth at bedtime for 30 days  -     MR Lumbar Spine w/o Contrast; Future  Pain of left upper extremity  -     Spine  Referral  Degeneration of lumbar intervertebral disc  -     MR Lumbar Spine w/o Contrast; Future       Patient is neurologically intact on exam. No myelopathic or red flag symptoms.    Patient presents with acute on chronic low back pain with left lumbar radiculopathy and possible chronic left knee pain. Recently seen at ER due to severity of back pain and subsequently referred to spine clinic for evaluation. Has been trying conservative management at home with medication and has no recent imaging so at this juncture we will pursue imaging and follow up with updated imaging. Patient's work is physically demanding so with his acute flare-up of pain he is recommended to stay out of work pending updated imaging and re-evaluation. Letter provided.    In the interim we will start neuropathic treatment with gabapentin at night. Advised of cognitive side effects, do not drive or operate machinery.    PLAN:  Reviewed spine anatomy and disease process. Discussed diagnosis and treatment options with the patient today. A shared decision making model was used. The patient's values and choices were respected. The following represents what was discussed and decided upon by the provider and the patient.    1. DIAGNOSTIC TESTS  X-ray of lumbar spine and knees with bending views was ordered for further evaluation  MRI of lumbar spine was ordered for  further characterization of soft tissues and/or neural compression    2. PHYSICAL THERAPY  Patient is not a candidate for PT right now due to inability to tolerate therapy due to pain level.  Discussed the importance of core strengthening, ROM, stretching exercises with the patient and how each of these entities is important in decreasing pain.  Explained to the patient that the purpose of physical therapy is to teach the patient a home exercise program.  These exercises need to be performed every day in order to decrease pain and prevent future occurrences of pain.  Likened it to brushing one's teeth.      3. MEDICATIONS:  Discussed multiple medication options today with patient. Discussed risks, side effects, and proper use of medications. Patient verbalized understanding.  We will start gabapentin at night only to reduce risk of daytime drowsiness, sedation, or other cognitive side effects.  Patient advised to call our clinic's nurse navigation line (number provided) if they experience any side effects or intolerances with prescribed medication.    4. INTERVENTIONS:  Patient requires further imaging to assess what interventional options may be best for them.    5. OTHER REFERRALS:  No other referrals at this time.    6. FOLLOW-UP  In 4-6 weeks to review imaging results.  Patient advised to contact our office for earlier appointment if symptoms worsening or not improving, or any side effects are noticed.  Follow up once imaging is completed    Advised patient to call the Spine Center if symptoms worsen or you have problems controlling bladder and bowel function.   ______________________________________________________________________    SUBJECTIVE:   Melo Brewer  is a 63 year old male who presents today for new patient evaluation.    Patient reports chronic low back pain for many years which has been manageable but in the past few months it has been worsening. Recently went to ER (1/20/24) and was given pain  control, referred to spine health for further evaluation.    Today he reports pain is across the low back but possibly worse on the left side. He also reports pain in and around the left knee associated with tingling and paresthesias. Unsure if pain is shooting down from back to leg. Pain worsens with prolonged standing or sitting, bending, lifting, twisting. No maggi numbness, weakness, or bladder/bowel incontinence. No prior PT, injections, or surgeries.    No prior back surgeries    -Treatment to Date: Medrol pack and oxycodone from ER      Oswestry (GALINA) Questionnaire         No data to display                Neck Disability Index:       No data to display                       -Medications:    Current Outpatient Medications   Medication    acetaminophen (TYLENOL) 650 MG CR tablet    atorvastatin (LIPITOR) 80 MG tablet    escitalopram (LEXAPRO) 10 MG tablet    gabapentin (NEURONTIN) 300 MG capsule    ibuprofen (ADVIL/MOTRIN) 200 MG tablet    lisinopril (ZESTRIL) 10 MG tablet    methylPREDNISolone (MEDROL DOSEPAK) 4 MG tablet therapy pack    metoprolol succinate ER (TOPROL XL) 25 MG 24 hr tablet    oxyCODONE (ROXICODONE) 5 MG tablet    SM ASPIRIN LOW DOSE 81 MG EC tablet     No current facility-administered medications for this visit.       Allergies   Allergen Reactions    Apple Juice Anaphylaxis    Bees Anaphylaxis       No past medical history on file.     Patient Active Problem List   Diagnosis    CARDIOVASCULAR SCREENING; LDL GOAL LESS THAN 130    Carotid artery stenosis       Past Surgical History:   Procedure Laterality Date    BACK SURGERY      COLONOSCOPY N/A 5/19/2017    Procedure: COLONOSCOPY;  Colonoscopy;  Surgeon: Robert Elmore MD;  Location: [facility]    ENDARTERECTOMY CAROTID Right 2/26/2019    Procedure: RIGHT CAROTID ENDARTERECTOMY WITH EEG AND INTRAOPERATIVE ULTRASOUND;  Surgeon: Sae Ramos MD;  Location: [facility] OR    HC EXCIS PRIMARY GANGLION WRIST  12/23/2004    Left wrist    HC INJ  "EPIDURAL LUMBAR/SACRAL W/WO CONTRAST  2009    HC REMOVAL OF TONSILS,<13 Y/O  1967    Tonsils <13y.o.    SURGICAL HISTORY OF -   4/24/2009    Left C6-C7 Microdiskectomy.       Family History   Problem Relation Age of Onset    Hypertension Father         on medication HTN    Hyperlipidemia Father     Hypertension Brother        Reviewed past medical, surgical, and family history with patient found on new patient intake packet located in EMR Media tab.     SOCIAL HX: Reviewed    ROS: Specifically negative for bowel/bladder dysfunction, balance changes, headache, dizziness, foot drop, fevers, chills, appetite changes, nausea/vomiting, unexplained weight loss. Otherwise 13 systems reviewed are negative. Please see the patient's intake questionnaire from today for details.    OBJECTIVE:  /82 (BP Location: Right arm, Patient Position: Sitting, Cuff Size: Adult Regular)   Pulse 96   Ht 5' 6\" (1.676 m)   Wt 165 lb 4.8 oz (75 kg)   SpO2 99%   BMI 26.68 kg/m      PHYSICAL EXAMINATION:  --CONSTITUTIONAL: Vital signs as above. No acute distress. The patient is well nourished and well groomed.  --PSYCHIATRIC: The patient is awake, alert, oriented to person, place, time and answering questions appropriately with clear speech. Appropriate mood and affect   --RESPIRATORY: Normal rhythm and effort. No abnormal accessory muscle breathing patterns noted.   --GROSS MOTOR: Easily arises from a seated position.  --LUMBAR SPINE: Inspection reveals no evidence of deformity. Range of motion is not limited in flexion, extension, lateral rotation. Mild tenderness to palpation of lumbar paraspinals, no tenderness in spinous processes.  Facet loading (Mustafa test) positive on left, seated SLR positive on left  --HIPS: Full range of motion bilaterally. Negative KEMI test.  --LOWER EXTREMITY MOTOR TESTING:  Hip flexion left 5/5, right 5/5  Knee extension left 5/5, right 5/5  Ankle dorsiflexors left 5/5, right 5/5  Ankle plantarflexors " left 5/5, right 5/5   Great toe extension left 5/5, right 5/5   Knee flexion left 5/5, right 5/5  --NEUROLOGIC: Sensation to lower extremities is intact bilaterally in L2-S1 dermatomes.  Negative Hunt's bilaterally. Reflexes intact in BLE, no clonus.  --VASCULAR: Warm upper limbs bilaterally. Capillary refill in the upper extremities is less than 1 second.    RESULTS: Available medical records from [facility] and any other outside records were reviewed today.     Imaging:  Available relevant imaging was personally reviewed and interpreted today. The images were shown to the patient and the findings were explained using a spine model.    No results found.

## 2024-01-29 NOTE — LETTER
1/29/2024         RE: Melo Brewer  [address]  [facility] [address]        Dear Colleague,    Thank you for referring your patient, Melo Brewer, to the [facility] SPINE [facility]UROS[facility]ERY. Please see a copy of my visit note below.    ASSESSMENT:  Melo Brewer is a 63 year old male presents for consultation at the request of Veto Scanlon who presents today for new patient evaluation of :         Diagnoses and all orders for this visit:  Bilateral chronic knee pain  -     XR Knee Bilateral 3 Views; Future  Lumbar radiculopathy  -     Spine  Referral  -     XR Lumbar Spine w Bending Comp G/E 6 Views; Future  -     gabapentin (NEURONTIN) 300 MG capsule; Take 1 capsule (300 mg) by mouth at bedtime for 30 days  Pain of left upper extremity  -     Spine  Referral       Patient is neurologically intact on exam. No myelopathic or red flag symptoms.    Patient presents with acute on chronic low back pain with left lumbar radiculopathy and possible chronic left knee pain. Recently seen at ER due to severity of back pain and subsequently referred to spine clinic for evaluation. Has been trying conservative management at home with medication and has no recent imaging so at this juncture we will pursue imaging and follow up with updated imaging. Patient's work is physically demanding so with his acute flare-up of pain he is recommended to stay out of work pending updated imaging and re-evaluation. Letter provided.    In the interim we will start neuropathic treatment with gabapentin at night. Advised of cognitive side effects, do not drive or operate machinery.    PLAN:  Reviewed spine anatomy and disease process. Discussed diagnosis and treatment options with the patient today. A shared decision making model was used. The patient's values and choices were respected. The following represents what was discussed and decided upon by the provider and the patient.    1. DIAGNOSTIC  TESTS  X-ray of lumbar spine and knees with bending views was ordered for further evaluation  MRI of lumbar spine was ordered for further characterization of soft tissues and/or neural compression    2. PHYSICAL THERAPY  Patient is not a candidate for PT right now due to inability to tolerate therapy due to pain level.  Discussed the importance of core strengthening, ROM, stretching exercises with the patient and how each of these entities is important in decreasing pain.  Explained to the patient that the purpose of physical therapy is to teach the patient a home exercise program.  These exercises need to be performed every day in order to decrease pain and prevent future occurrences of pain.  Likened it to brushing one's teeth.      3. MEDICATIONS:  Discussed multiple medication options today with patient. Discussed risks, side effects, and proper use of medications. Patient verbalized understanding.  We will start gabapentin at night only to reduce risk of daytime drowsiness, sedation, or other cognitive side effects.  Patient advised to call our clinic's nurse navigation line (number provided) if they experience any side effects or intolerances with prescribed medication.    4. INTERVENTIONS:  Patient requires further imaging to assess what interventional options may be best for them.    5. OTHER REFERRALS:  No other referrals at this time.    6. FOLLOW-UP  In 4-6 weeks to review imaging results.  Patient advised to contact our office for earlier appointment if symptoms worsening or not improving, or any side effects are noticed.  Follow up once imaging is completed    Advised patient to call the Spine Center if symptoms worsen or you have problems controlling bladder and bowel function.   ______________________________________________________________________    SUBJECTIVE:   Melo Brewer  is a 63 year old male who presents today for new patient evaluation.    Patient reports chronic low back pain for many  years which has been manageable but in the past few months it has been worsening. Recently went to ER (1/20/24) and was given pain control, referred to spine health for further evaluation.    Today he reports pain is across the low back but possibly worse on the left side. He also reports pain in and around the left knee associated with tingling and paresthesias. Unsure if pain is shooting down from back to leg. Pain worsens with prolonged standing or sitting, bending, lifting, twisting. No maggi numbness, weakness, or bladder/bowel incontinence. No prior PT, injections, or surgeries.    No prior back surgeries    -Treatment to Date: Medrol pack and oxycodone from ER      Oswestry (GALINA) Questionnaire         No data to display                Neck Disability Index:       No data to display                       -Medications:    Current Outpatient Medications   Medication     acetaminophen (TYLENOL) 650 MG CR tablet     atorvastatin (LIPITOR) 80 MG tablet     escitalopram (LEXAPRO) 10 MG tablet     gabapentin (NEURONTIN) 300 MG capsule     ibuprofen (ADVIL/MOTRIN) 200 MG tablet     lisinopril (ZESTRIL) 10 MG tablet     methylPREDNISolone (MEDROL DOSEPAK) 4 MG tablet therapy pack     metoprolol succinate ER (TOPROL XL) 25 MG 24 hr tablet     oxyCODONE (ROXICODONE) 5 MG tablet     SM ASPIRIN LOW DOSE 81 MG EC tablet     No current facility-administered medications for this visit.       Allergies   Allergen Reactions     Apple Juice Anaphylaxis     Bees Anaphylaxis       No past medical history on file.     Patient Active Problem List   Diagnosis     CARDIOVASCULAR SCREENING; LDL GOAL LESS THAN 130     Carotid artery stenosis       Past Surgical History:   Procedure Laterality Date     BACK SURGERY       COLONOSCOPY N/A 5/19/2017    Procedure: COLONOSCOPY;  Colonoscopy;  Surgeon: Robert Elmore MD;  Location: [facility]     ENDARTERECTOMY CAROTID Right 2/26/2019    Procedure: RIGHT CAROTID ENDARTERECTOMY WITH EEG AND  "INTRAOPERATIVE ULTRASOUND;  Surgeon: Sae Ramos MD;  Location: [facility] OR     HC EXCIS PRIMARY GANGLION WRIST  12/23/2004    Left wrist     HC INJ EPIDURAL LUMBAR/SACRAL W/WO CONTRAST  2009     HC REMOVAL OF TONSILS,<13 Y/O  1967    Tonsils <13y.o.     SURGICAL HISTORY OF -   4/24/2009    Left C6-C7 Microdiskectomy.       Family History   Problem Relation Age of Onset     Hypertension Father         on medication HTN     Hyperlipidemia Father      Hypertension Brother        Reviewed past medical, surgical, and family history with patient found on new patient intake packet located in EMR Media tab.     SOCIAL HX: Reviewed    ROS: Specifically negative for bowel/bladder dysfunction, balance changes, headache, dizziness, foot drop, fevers, chills, appetite changes, nausea/vomiting, unexplained weight loss. Otherwise 13 systems reviewed are negative. Please see the patient's intake questionnaire from today for details.    OBJECTIVE:  /82 (BP Location: Right arm, Patient Position: Sitting, Cuff Size: Adult Regular)   Pulse 96   Ht 5' 6\" (1.676 m)   Wt 165 lb 4.8 oz (75 kg)   SpO2 99%   BMI 26.68 kg/m      PHYSICAL EXAMINATION:  --CONSTITUTIONAL: Vital signs as above. No acute distress. The patient is well nourished and well groomed.  --PSYCHIATRIC: The patient is awake, alert, oriented to person, place, time and answering questions appropriately with clear speech. Appropriate mood and affect   --RESPIRATORY: Normal rhythm and effort. No abnormal accessory muscle breathing patterns noted.   --GROSS MOTOR: Easily arises from a seated position.  --LUMBAR SPINE: Inspection reveals no evidence of deformity. Range of motion is not limited in flexion, extension, lateral rotation. Mild tenderness to palpation of lumbar paraspinals, no tenderness in spinous processes.  Facet loading (Mustafa test) positive on left, seated SLR positive on left  --HIPS: Full range of motion bilaterally. Negative KEMI test.  --LOWER " EXTREMITY MOTOR TESTING:  Hip flexion left 5/5, right 5/5  Knee extension left 5/5, right 5/5  Ankle dorsiflexors left 5/5, right 5/5  Ankle plantarflexors left 5/5, right 5/5   Great toe extension left 5/5, right 5/5   Knee flexion left 5/5, right 5/5  --NEUROLOGIC: Sensation to lower extremities is intact bilaterally in L2-S1 dermatomes.  Negative Hunt's bilaterally. Reflexes intact in BLE, no clonus.  --VASCULAR: Warm upper limbs bilaterally. Capillary refill in the upper extremities is less than 1 second.    RESULTS: Available medical records from [facility] and any other outside records were reviewed today.     Imaging:  Available relevant imaging was personally reviewed and interpreted today. The images were shown to the patient and the findings were explained using a spine model.    No results found.       Again, thank you for allowing me to participate in the care of your patient.        Sincerely,        Andrew Lopez MD

## 2024-01-30 ENCOUNTER — HOSPITAL ENCOUNTER (EMERGENCY)
Facility: CLINIC | Age: 64
Discharge: HOME | End: 2024-01-30
Attending: STUDENT IN AN ORGANIZED HEALTH CARE EDUCATION/TRAINING PROGRAM | Admitting: STUDENT IN AN ORGANIZED HEALTH CARE EDUCATION/TRAINING PROGRAM
Payer: COMMERCIAL

## 2024-01-30 VITALS
SYSTOLIC BLOOD PRESSURE: 104 MMHG | DIASTOLIC BLOOD PRESSURE: 69 MMHG | BODY MASS INDEX: 26.7 KG/M2 | HEART RATE: 62 BPM | WEIGHT: 165.4 LBS | OXYGEN SATURATION: 99 % | TEMPERATURE: 98.6 F | RESPIRATION RATE: 18 BRPM

## 2024-01-30 DIAGNOSIS — M54.42 ACUTE BILATERAL LOW BACK PAIN WITH LEFT-SIDED SCIATICA: ICD-10-CM

## 2024-01-30 PROCEDURE — 250N000011 HC RX IP 250 OP 636: Performed by: STUDENT IN AN ORGANIZED HEALTH CARE EDUCATION/TRAINING PROGRAM

## 2024-01-30 PROCEDURE — 99284 EMERGENCY DEPT VISIT MOD MDM: CPT | Performed by: STUDENT IN AN ORGANIZED HEALTH CARE EDUCATION/TRAINING PROGRAM

## 2024-01-30 PROCEDURE — 96372 THER/PROPH/DIAG INJ SC/IM: CPT | Performed by: STUDENT IN AN ORGANIZED HEALTH CARE EDUCATION/TRAINING PROGRAM

## 2024-01-30 RX ORDER — PREDNISONE 20 MG/1
TABLET ORAL
Qty: 10 TABLET | Refills: 0 | Status: SHIPPED | OUTPATIENT
Start: 2024-01-30 | End: 2024-02-23

## 2024-01-30 RX ORDER — KETOROLAC TROMETHAMINE 30 MG/ML
30 INJECTION, SOLUTION INTRAMUSCULAR; INTRAVENOUS ONCE
Status: COMPLETED | OUTPATIENT
Start: 2024-01-30 | End: 2024-01-30

## 2024-01-30 RX ORDER — OXYCODONE AND ACETAMINOPHEN 5; 325 MG/1; MG/1
1 TABLET ORAL EVERY 6 HOURS PRN
Qty: 12 TABLET | Refills: 0 | Status: SHIPPED | OUTPATIENT
Start: 2024-01-30 | End: 2024-02-02

## 2024-01-30 RX ADMIN — HYDROMORPHONE HYDROCHLORIDE 1 MG: 1 INJECTION, SOLUTION INTRAMUSCULAR; INTRAVENOUS; SUBCUTANEOUS at 08:10

## 2024-01-30 RX ADMIN — KETOROLAC TROMETHAMINE 30 MG: 30 INJECTION INTRAMUSCULAR; INTRAVENOUS at 08:07

## 2024-01-30 ASSESSMENT — ACTIVITIES OF DAILY LIVING (ADL): ADLS_ACUITY_SCORE: 35

## 2024-01-30 NOTE — ED PROVIDER NOTES
History     Chief Complaint   Patient presents with    Back Pain     HPI  Melo Brewer is a 63 year old male who was seen here in the ED on 1/20/24 for 3-4 weeks of left low back pain.  This radiates to his left knee and he was started on methylprednisolone 4 mg, flexeril, and oxycodone 5 mg with good management of his pain, until yesterday morning when his pain flared back up. Was seen yesterday at the spine clinic and started on gabapentin and scheduled for an MRI on 2/6/24. He slept okay last night but within 5-10 minutes of getting up this morning his pain returned to the same as yesterday. Better when laying down (3/10 pain) versus sitting or walking (10/10 pain). Took the oxycodone, flexeril, and tylenol this morning at 5:30 AM without relief. Possible weakness in his left leg, he was not putting full pressure on it while walking this morning. No fevers/chills/sweats. No urinary or stool incontinence. No nausea or vomiting. No abdominal pain. No falls, trauma, or strain of the back in the last few days. No numbness. No history of cancer, IV drug use, or diabetes. Accompanied by his wife today.     Allergies:  Allergies   Allergen Reactions    Apple Juice Anaphylaxis    Bees Anaphylaxis       Problem List:    Patient Active Problem List    Diagnosis Date Noted    Carotid artery stenosis 02/26/2019     Priority: Medium    CARDIOVASCULAR SCREENING; LDL GOAL LESS THAN 130 10/31/2010     Priority: Medium        Past Medical History:    History reviewed. No pertinent past medical history.    Past Surgical History:    Past Surgical History:   Procedure Laterality Date    BACK SURGERY      COLONOSCOPY N/A 5/19/2017    Procedure: COLONOSCOPY;  Colonoscopy;  Surgeon: Robert Elmore MD;  Location: [facility]    ENDARTERECTOMY CAROTID Right 2/26/2019    Procedure: RIGHT CAROTID ENDARTERECTOMY WITH EEG AND INTRAOPERATIVE ULTRASOUND;  Surgeon: Sae Ramos MD;  Location: [facility]    HC EXCIS PRIMARY GANGLION WRIST   12/23/2004    Left wrist    HC INJ EPIDURAL LUMBAR/SACRAL W/WO CONTRAST  2009    HC REMOVAL OF TONSILS,<14 Y/O  1967    Tonsils <13y.o.    SURGICAL HISTORY OF -   4/24/2009    Left C6-C7 Microdiskectomy.       Family History:    Family History   Problem Relation Age of Onset    Hypertension Father         on medication HTN    Hyperlipidemia Father     Hypertension Brother        Social History:  Marital Status:   [2]  Social History     Tobacco Use    Smoking status: Former     Packs/day: 0.50     Years: 15.00     Additional pack years: 0.00     Total pack years: 7.50     Types: Cigarettes    Smokeless tobacco: Never   Substance Use Topics    Alcohol use: Yes     Comment: 1-2 daily    Drug use: No        Medications:    acetaminophen (TYLENOL) 650 MG CR tablet  oxyCODONE (ROXICODONE) 5 MG tablet  oxyCODONE-acetaminophen (PERCOCET) 5-325 MG tablet  predniSONE (DELTASONE) 20 MG tablet  atorvastatin (LIPITOR) 80 MG tablet  escitalopram (LEXAPRO) 10 MG tablet  gabapentin (NEURONTIN) 300 MG capsule  ibuprofen (ADVIL/MOTRIN) 200 MG tablet  lisinopril (ZESTRIL) 10 MG tablet  methylPREDNISolone (MEDROL DOSEPAK) 4 MG tablet therapy pack  metoprolol succinate ER (TOPROL XL) 25 MG 24 hr tablet  SM ASPIRIN LOW DOSE 81 MG EC tablet          Review of Systems   All other systems reviewed and are negative.  See HPI.    Physical Exam   BP: 108/77  Pulse: 83  Temp: 98.6  F (37  C)  Resp: 18  Weight: 75 kg (165 lb 6.4 oz)  SpO2: 99 %      Physical Exam  Constitutional:       General: He is not in acute distress.     Appearance: Normal appearance. He is not ill-appearing or diaphoretic.   HENT:      Nose: Nose normal.      Mouth/Throat:      Mouth: Mucous membranes are moist.   Eyes:      Extraocular Movements: Extraocular movements intact.      Conjunctiva/sclera: Conjunctivae normal.      Pupils: Pupils are equal, round, and reactive to light.   Cardiovascular:      Rate and Rhythm: Normal rate and regular rhythm.       Pulses: Normal pulses.      Heart sounds: Normal heart sounds.   Pulmonary:      Effort: Pulmonary effort is normal.      Breath sounds: Normal breath sounds.   Abdominal:      Tenderness: There is no abdominal tenderness. There is no right CVA tenderness or left CVA tenderness.   Musculoskeletal:         General: Normal range of motion.      Cervical back: Normal range of motion and neck supple. No rigidity or tenderness.      Lumbar back: Tenderness (paraspinal muscles) present. No deformity or bony tenderness.      Comments: Straight leg raise positive at 45 degrees on the left side.    Skin:     General: Skin is warm and dry.      Capillary Refill: Capillary refill takes less than 2 seconds.   Neurological:      General: No focal deficit present.      Mental Status: He is alert and oriented to person, place, and time.      Motor: No weakness.      Gait: Gait normal.      Comments: Strength is normal, equal in the legs.  Normal deep tendon reflexes, no clonus.   Psychiatric:         Mood and Affect: Mood normal.         Behavior: Behavior normal.         ED Course          ED Course as of 01/30/24 1435   Tue Jan 30, 2024   0854 Pain is dramatically improved.  Patient was able to ambulate around the emergency department without significant difficulty.  He feels comfortable with discharge home, new prescription for prednisone, also refill of narcotic pain medication.     Procedures                No results found for this or any previous visit (from the past 24 hour(s)).    Medications   ketorolac (TORADOL) injection 30 mg (30 mg Intramuscular $Given 1/30/24 0807)   HYDROmorphone (DILAUDID) injection 1 mg (1 mg Intramuscular $Given 1/30/24 0810)       Assessments & Plan (with Medical Decision Making)     I have reviewed the nursing notes.    I have reviewed the findings, diagnosis, plan and need for follow up with the patient.    Medical Decision Making  63 year old male who presents with left low back pain onset  5-6 weeks ago that radiates to his left knee. Was seen in the ED on 1/20/24 with started on methylprednisolone 4 mg, flexeril, and oxycodone 5 mg with good management of his pain until yesterday morning when his pain flared back up. Had appointment with spine clinic yesterday and MRI is scheduled for 2/6/24. No recent falls, trauma, or strain. No fevers, chills, or sweats. No urinary or stool incontinence. No saddle anesthesia. No focal weakness. No diabetes, cancer, or IV drug use.     Vitals are stable, patient is afebrile. On exam, patient is supine laying still but able to move throughout exam. No bony tenderness to lumbar spine. Mild paraspinal muscular tenderness to palpation. Positive straight leg raise at 45 degrees on left side. No focal weakness.     Patient was given IM Dilaudid and IM Toradol with significant relief. Patient was able to ambulate around the emergency room without difficulty. Discussed with patient his symptoms are consistent with sciatica and that he does not have red flag symptoms for an emergent MRI at this time. Discussed his methylprenisolone can be increased to 20 mg and prescribed percocet q6h PRN to hopefully improve his pain management until his MRI scheduled for 2/6/24. Recommended he continue to use Tylenol, ibuprofen, and gabapentin and that he follow up with his PCP and spine specialist as soon as possible. Patient and wife in agreement with the plan. He is to return to the ED immediately if any new or worsening symptoms. Patient stable for discharge.       Discharge Medication List as of 1/30/2024  9:19 AM        START taking these medications    Details   oxyCODONE-acetaminophen (PERCOCET) 5-325 MG tablet Take 1 tablet by mouth every 6 hours as needed for pain, Disp-12 tablet, R-0, E-Prescribe      predniSONE (DELTASONE) 20 MG tablet Take two tablets (= 40mg) each day for 5 (five) days, Disp-10 tablet, R-0, E-Prescribe             Final diagnoses:   Acute bilateral low back  pain with left-sided sciatica       1/30/2024   [facility] EMERGENCY DEPT    Raquel Pedro, MS3    I was present with the medical student who participated in the service and in the documentation of the note. I have verified the history and personally performed the physical exam and medical decision making. I reviewed the note in detail and edited it appropriately. I agree with the assessment and plan of care as documented in the note.      Hemant Singh MD  01/30/24 [id number]

## 2024-01-30 NOTE — DISCHARGE INSTRUCTIONS
I do think your symptoms are still consistent with sciatica.  I am glad that you are feeling better after medications here in the emergency department.  New prescriptions for prednisone and a different pain medication were provided today.  Please fill and take the steroid as instructed until entirely gone.  Use the Percocet as needed.  I would continue to use Tylenol, ibuprofen, and gabapentin.  Follow-up with your primary care doctor and spine specialist soon as possible.  Return to the emergency department immediately in the meantime for any new or worsening symptoms.

## 2024-01-30 NOTE — Clinical Note
Melo Daniella was seen and treated in our emergency department on 1/30/2024.  He may return to work on 02/01/2024.       If you have any questions or concerns, please don't hesitate to call.      Hemant Singh MD

## 2024-02-02 ENCOUNTER — PATIENT OUTREACH (OUTPATIENT)
Dept: CARE COORDINATION | Facility: CLINIC | Age: 64
End: 2024-02-02
Payer: COMMERCIAL

## 2024-02-02 NOTE — PROGRESS NOTES
Clinic Care Coordination Contact  [facility]/Voicemail    Clinical Data: Care Coordinator Outreach    Outreach Documentation Number of Outreach Attempt   2/2/2024   2:26 PM 1       Left message on patient's voicemail with call back information and requested return call.    Plan: Care Coordinator will try to reach patient again in 1-2 business days.    Teresa Russell  Community Health Worker  [facility] Transitions Program  [facility]  [email or web address]  Office: [phone]

## 2024-02-05 ENCOUNTER — E-VISIT (OUTPATIENT)
Dept: FAMILY MEDICINE | Facility: CLINIC | Age: 64
End: 2024-02-05
Payer: COMMERCIAL

## 2024-02-05 DIAGNOSIS — M54.42 ACUTE MIDLINE LOW BACK PAIN WITH LEFT-SIDED SCIATICA: Primary | ICD-10-CM

## 2024-02-05 PROCEDURE — 99207 PR NON-BILLABLE SERV PER CHARTING: CPT | Performed by: INTERNAL MEDICINE

## 2024-02-05 NOTE — PATIENT INSTRUCTIONS
Thank you for choosing us for your care. I think an in-clinic visit would be best next steps based on your symptoms. Please schedule a clinic appointment; you won t be charged for this eVisit.      You can schedule an appointment right here in [facility], or call [phone]

## 2024-02-05 NOTE — TELEPHONE ENCOUNTER
"Please place the patient on my schedule in any available 20 minute time slot that is NOT listed as:   \"U N A V A I L A B L E\".    If a timely appointment is not available, please refer the patient to one of the many excellent [place] providers who might have an opening.    Thank you.    Kathleen"

## 2024-02-05 NOTE — TELEPHONE ENCOUNTER
Patient has an appointment with specialist, as well as Imaging scheduled, instructed patient to reach out to specialist if he is needing pain medication prior to his upcoming appointment.    Daniela Interiano XRO/

## 2024-02-06 ENCOUNTER — MYC MEDICAL ADVICE (OUTPATIENT)
Dept: PHYSICAL MEDICINE AND REHAB | Facility: CLINIC | Age: 64
End: 2024-02-06

## 2024-02-06 ENCOUNTER — HOSPITAL ENCOUNTER (OUTPATIENT)
Dept: GENERAL RADIOLOGY | Facility: CLINIC | Age: 64
Discharge: HOME | End: 2024-02-06
Attending: STUDENT IN AN ORGANIZED HEALTH CARE EDUCATION/TRAINING PROGRAM
Payer: COMMERCIAL

## 2024-02-06 ENCOUNTER — OFFICE VISIT (OUTPATIENT)
Dept: FAMILY MEDICINE | Facility: CLINIC | Age: 64
End: 2024-02-06
Payer: COMMERCIAL

## 2024-02-06 ENCOUNTER — TELEPHONE (OUTPATIENT)
Dept: PHYSICAL MEDICINE AND REHAB | Facility: CLINIC | Age: 64
End: 2024-02-06

## 2024-02-06 ENCOUNTER — HOSPITAL ENCOUNTER (OUTPATIENT)
Dept: MRI IMAGING | Facility: CLINIC | Age: 64
Discharge: HOME | End: 2024-02-06
Attending: STUDENT IN AN ORGANIZED HEALTH CARE EDUCATION/TRAINING PROGRAM
Payer: COMMERCIAL

## 2024-02-06 VITALS
DIASTOLIC BLOOD PRESSURE: 62 MMHG | WEIGHT: 169.13 LBS | HEART RATE: 106 BPM | BODY MASS INDEX: 27.18 KG/M2 | HEIGHT: 66 IN | OXYGEN SATURATION: 100 % | SYSTOLIC BLOOD PRESSURE: 122 MMHG | RESPIRATION RATE: 18 BRPM | TEMPERATURE: 97.3 F

## 2024-02-06 DIAGNOSIS — M54.42 CHRONIC BILATERAL LOW BACK PAIN WITH LEFT-SIDED SCIATICA: Primary | ICD-10-CM

## 2024-02-06 DIAGNOSIS — M47.26 OSTEOARTHRITIS OF SPINE WITH RADICULOPATHY, LUMBAR REGION: ICD-10-CM

## 2024-02-06 DIAGNOSIS — M51.369 DEGENERATION OF LUMBAR INTERVERTEBRAL DISC: ICD-10-CM

## 2024-02-06 DIAGNOSIS — M54.16 LUMBAR RADICULOPATHY: ICD-10-CM

## 2024-02-06 DIAGNOSIS — G89.29 CHRONIC PAIN OF RIGHT KNEE: ICD-10-CM

## 2024-02-06 DIAGNOSIS — M25.561 CHRONIC PAIN OF RIGHT KNEE: ICD-10-CM

## 2024-02-06 DIAGNOSIS — G89.29 CHRONIC BILATERAL LOW BACK PAIN WITH LEFT-SIDED SCIATICA: Primary | ICD-10-CM

## 2024-02-06 PROCEDURE — 99214 OFFICE O/P EST MOD 30 MIN: CPT

## 2024-02-06 PROCEDURE — 72148 MRI LUMBAR SPINE W/O DYE: CPT

## 2024-02-06 PROCEDURE — 72114 X-RAY EXAM L-S SPINE BENDING: CPT

## 2024-02-06 PROCEDURE — 73562 X-RAY EXAM OF KNEE 3: CPT | Mod: 50

## 2024-02-06 RX ORDER — GABAPENTIN 100 MG/1
100 CAPSULE ORAL 3 TIMES DAILY PRN
Qty: 30 CAPSULE | Refills: 0 | Status: SHIPPED | OUTPATIENT
Start: 2024-02-06 | End: 2024-02-13

## 2024-02-06 RX ORDER — CYCLOBENZAPRINE HCL 10 MG
10 TABLET ORAL 3 TIMES DAILY PRN
Qty: 30 TABLET | Refills: 0 | Status: SHIPPED | OUTPATIENT
Start: 2024-02-06 | End: 2024-02-13

## 2024-02-06 RX ORDER — CELECOXIB 200 MG/1
200 CAPSULE ORAL DAILY
Qty: 10 CAPSULE | Refills: 0 | Status: SHIPPED | OUTPATIENT
Start: 2024-02-06 | End: 2024-02-13

## 2024-02-06 ASSESSMENT — PATIENT HEALTH QUESTIONNAIRE - PHQ9
SUM OF ALL RESPONSES TO PHQ QUESTIONS 1-9: 2
SUM OF ALL RESPONSES TO PHQ QUESTIONS 1-9: 2

## 2024-02-06 ASSESSMENT — PAIN SCALES - GENERAL: PAINLEVEL: MODERATE PAIN (4)

## 2024-02-06 ASSESSMENT — ENCOUNTER SYMPTOMS: BACK PAIN: 1

## 2024-02-06 NOTE — TELEPHONE ENCOUNTER
Please re-advise patient that our practice does not do opioid medication management so we would not be able to provide that medication.

## 2024-02-06 NOTE — TELEPHONE ENCOUNTER
Who's calling: Mary [employer]  (Patient/ family calling on behalf of the patient)  On C2C: n/a  (Family/friends calling are they on patient Consent to Communicate form)   Reason for call: Mary is calling in request to patient's office notes to be faxed to [phone] for his disability claim.  Per agent claims that information wasn't attached to the first form that was faxed from clinic. Please advise   (Keep it simple but as detailed as to why the patient/family is calling)  Preferred Pharmacy: n/a  Call back #: [phone] EXT: [phone]  Providers name: Dr. Lopez

## 2024-02-06 NOTE — PROGRESS NOTES
Clinic Care Coordination Contact  [facility]/Voicemail    Clinical Data: Care Coordinator Outreach    Outreach Documentation Number of Outreach Attempt   2/2/2024   2:26 PM 1   2/6/2024   1:52 PM 2       Left message on patient's voicemail with call back information and requested return call.    Plan: Care Coordinator will send disenrollment letter with care coordinator contact information via mail. Care Coordinator will do no further outreaches at this time.    Teresa Russell  Community Health Worker  [facility] Transitions Program  [facility]  [email or web address]  Office: [phone]

## 2024-02-06 NOTE — TELEPHONE ENCOUNTER
PSP:  Dr. Lopez  Last clinic visit:  1/29/24 Consult  Reason for call: Medication Management  Clinical information:  Call received from pt. Pt completed MR lumbar spine, XR lumbar spine and XR knees bilateral as recommended by Dr. Lopez (exam ended but final results not back yet). He has follow-up appointment on Friday 2/9 to review findings but he inquires about medication management in the meantime.   Pt states he has been the ED 2 times now due to severe pain. Pt was given a medrol dose christelle that he has completed and reports no relief with this. He takes gabapentin 300 mg at bedtime with no reported side effects. He has tried Oxycodone which was not effective. He takes Percocet which he states is effective. No OTC meds.   Advice given to patient: Informed pt that provider would be updated with his status. Pharmacy verified as [facility] [place], [place] on file should any medication be prescribed.   Provider to address: Please advise if any recommendations prior to appt on Friday 2/9

## 2024-02-06 NOTE — TELEPHONE ENCOUNTER
"Per Dr. Lopez,   \"Please advise patient that due to potential interactions with his existing medications, there are no medications we can prescribe safely without evaluating him first. We do not manage opioid medications here so we cannot manage oxycodone or percocet. Our recommendation would be to come in as scheduled as that is our earliest available appointment, or if the pain is severe and uncontrollable then he should go to an ER for evaluation.\"     Call placed to pt with provider's response. Pt stated understanding. He is aware we cannot manage opioid medications for him. He will plan to keep his follow-up appt as scheduled this Friday.   "

## 2024-02-06 NOTE — PATIENT INSTRUCTIONS
"Back pain/spondylosis:  Follow-up with your spine specialist as scheduled  Follow-up with your primary care provider    Patient has sciatica; Reviewed the condition with patient.  Discussed symptomatic treatments including use of ice and heat.  Antiinflammatories as needed. Patient had no further questions/concerns and is encouraged to follow up sooner if symptoms are not improved, are worsening, or new symptoms develop.  Recommend going to the ER if pain is unmanageable or if symptoms worsen.    Change you to gabapentin.  May do a trial of taking gabapentin 100 mg 3 times daily as needed for sciatica pain.  This medication will make you tired, do not drive or operate machinery on this medication.    Follow up with physical therapy     Lidocaine patches for 12 hours on, 12 hours off     Tylenol as needed     Stop taking ibuprofen.    You may take Celebrex once daily in the morning as needed for pain.  Take this medication with food.  Avoid excessive use of this medication.  This medication can cause stomach irritation and kidney damage.  Stay well-hydrated and drink more than 70 ounces water daily.     Diclofenac gel 4 times a day scheduled     Flexeril (10 mg) muscle relaxer, as needed at bedtime for muscle spasms. This medication will cause sedation, do not drive while taking this medication     Heat pack for 10 minutes followed by exercises 4-5 times a day     Follow up with any new, worsening, or persistent symptoms     Go to the ER in the case of an emergency     Sciatica: Care Instructions  Overview     Sciatica (say \"yqv-IO-nu-kuh\") is an irritation of one of the sciatic nerves, which come from the spinal cord in the lower back. The sciatic nerves and their branches extend down through the buttock to the foot. Sciatica can develop when an injured disc or arthritis in the back irritates or presses against a spinal nerve root. Its main symptom is pain, numbness, or weakness that is often worse in the leg or " foot than in the back.  Sciatica often will improve and go away with time. Early treatment usually includes medicines and exercises to relieve pain.  Follow-up care is a key part of your treatment and safety. Be sure to make and go to all appointments, and call your doctor if you are having problems. It's also a good idea to know your test results and keep a list of the medicines you take.  How can you care for yourself at home?  Take pain medicines exactly as directed.  If the doctor gave you a prescription medicine for pain, take it as prescribed.  If you are not taking a prescription pain medicine, ask your doctor if you can take an over-the-counter medicine.  Use heat or ice to relieve pain.  To apply heat, put a warm water bottle, heating pad set on low, or warm cloth on your back. Do not go to sleep with a heating pad on your skin.  To use ice, put ice or a cold pack on the area for 10 to 20 minutes at a time. Put a thin cloth between the ice and your skin.  Avoid sitting if possible, unless it feels better than standing.  Alternate lying down with short walks. Increase your walking distance as you are able to without making your symptoms worse.  Do not do anything that makes your symptoms worse.  When should you call for help?   Call 911 anytime you think you may need emergency care. For example, call if:    You are unable to move a leg at all.   Call your doctor now or seek immediate medical care if:    You have new or worse symptoms in your legs or buttocks. Symptoms may include:  Numbness or tingling.  Weakness.  Pain.     You lose bladder or bowel control.   Watch closely for changes in your health, and be sure to contact your doctor if:    You are not getting better as expected.      Sciatica: Exercises  Introduction  Here are some examples of typical rehabilitation exercises for your condition. Start each exercise slowly. Ease off the exercise if you start to have pain.  Your doctor or physical  therapist will tell you when you can start these exercises and which ones will work best for you.  When you are not being active, find a comfortable position for rest. Some people are comfortable on the floor or a medium-firm bed with a small pillow under their head and another under their knees. Some people prefer to lie on their side with a pillow between their knees. Don't stay in one position for too long.  Take short walks (10 to 20 minutes) every 2 to 3 hours. Avoid slopes, hills, and stairs until you feel better. Walk only distances you can manage without pain, especially leg pain.  How to do the exercises  Cat-cow    Get on your hands and knees. Your shoulders should be directly above your wrists, and your hips should be above your knees. Your back should be flat, and your neck should extend out straight from your spine. Your gaze should be toward the floor below.  Relax your head and allow it to droop. Round your back up toward the ceiling until you feel a nice stretch in your upper, middle, and lower back. Hold this stretch for as long as it feels comfortable, or about 15 to 30 seconds.  Then let your back curve down by pressing your stomach toward the floor. Lift your buttocks toward the ceiling. If it doesn't bother your neck, you can raise your head as you allow your back to sway. Hold this position for 15 to 30 seconds.  Go back and forth smoothly 2 to 4 times between the rounded back and swayed back positions.  Follow-up care is a key part of your treatment and safety. Be sure to make and go to all appointments, and call your doctor if you are having problems. It's also a good idea to know your test results and keep a list of the medicines you take.     Patient understood and verbally consented to the treatment plan. Discussed symptoms that would warrant an urgent or emergent visit. All of the patients' questions were answered. Patient was instructed to contact the clinic if questions or concerns  arise. Recommend follow up appointments if symptoms worsen or fail to improve. Recommend follow up as needed. Recommend ER in the case of an emergency.     Clementine Knapp PA-C     Please note: Voice recognition software may have been used in preparing this note, unintended word substitutions may be present.    Spondylolysis and Spondylolisthesis: Exercises  Introduction  Here are some examples of exercises for you to try. The exercises may be suggested for a condition or for rehabilitation. Start each exercise slowly. Ease off the exercises if you start to have pain.  You will be told when to start these exercises and which ones will work best for you.  How to do the exercises  Single knee-to-chest    Lie on your back with your knees bent and your feet flat on the floor. You can put a small pillow under your head and neck if it is more comfortable.  Bring one knee to your chest, keeping the other foot flat on the floor.  Keep your lower back pressed to the floor. Hold for 15 to 30 seconds.  Relax, and lower the knee to the starting position.  Repeat with the other leg. Repeat 2 to 4 times with each leg.  To get more stretch, put your other leg flat on the floor while pulling your knee to your chest.  Double knee-to-chest stretch    Lie on your back with your knees bent and your feet flat on the floor. You can put a small pillow under your head and neck if it is more comfortable.  Bring your knees to your chest and hold them with your hands. Or you can hold at the fronts of your shins or behind your thighs.  Keep your lower back pressed to the floor. Hold the stretch for 15 to 30 seconds.  Relax, and lower your knees to the starting position.  Repeat 2 to 4 times.  Alternate arm and leg (bird dog) exercise    Do this exercise slowly. Try to keep your body straight at all times.  Start on the floor, on your hands and knees.  Tighten your belly muscles by pulling your belly button in toward your spine. Be sure you  continue to breathe normally and do not hold your breath.  Raise one arm off the floor, and hold it straight out in front of you. Be careful not to let your shoulder drop down, because that will twist your trunk.  Hold for about 6 seconds, then lower your arm and switch to your other arm.  Repeat 8 to 12 times on each arm.  When you can do this exercise with ease and no pain, repeat steps 1 through 5. But this time do it with one leg raised off the floor, holding your leg straight out behind you. Be careful not to let your hip drop down, because that will twist your trunk.  When holding your leg straight out becomes easier, try raising your opposite arm at the same time, and repeat steps 1 through 5.  Bridging (feet flat)    Lie on your back with both knees bent and your feet flat on the floor. Your knees should be bent about 90 degrees.  Tighten your belly muscles by pulling your belly button in toward your spine.  Push your feet into the floor, squeeze your buttocks, and lift your hips off the floor until your shoulders, hips, and knees are all in a straight line. Keep your hips level.  Hold about 6 seconds as you continue to breathe normally.  Slowly lower your hips back to the floor.  Repeat 8 to 12 times.  Curl-ups    Lie on the floor on your back with your knees bent at a 90-degree angle. Your feet should be flat on the floor, about 12 inches from your buttocks.  Cross your arms over your chest. If this bothers your neck, try putting your hands behind your neck (not your head), with your elbows spread apart.  Slowly tighten your belly muscles and raise your shoulder blades off the floor.  Keep your head in line with your body, and do not press your chin to your chest.  Hold this position for 1 or 2 seconds, then slowly lower yourself back down to the floor.  Repeat 8 to 12 times.  Plank    Do this exercise slowly. Try to keep your body straight at all times, and do not let one hip drop lower than the  other.  Lie on your stomach, resting your upper body on your forearms.  Tighten your belly muscles by pulling your belly button in toward your spine.  Keeping your knees on the floor, press down with your forearms to lift your upper body off the floor.  Hold for about 6 seconds, then lower your body to the floor. Rest for up to 10 seconds.  Repeat 8 to 12 times.  Over time, work up to holding for 15 to 30 seconds each time.  If this exercise is easy to do with your knees on the floor, try doing this exercise with your knees and legs straight, supported by your toes on the floor.  Follow-up care is a key part of your treatment and safety. Be sure to make and go to all appointments, and call your doctor if you are having problems. It's also a good idea to know your test results and keep a list of the medicines you take.  Current as of: July 18, 2023               Content Version: 13.8    9514-6588 [email or web address]   Care instructions adapted under license by your healthcare professional. If you have questions about a medical condition or this instruction, always ask your healthcare professional. [email or web address] disclaims any warranty or liability for your use of this information.    MRI lumbar spine results:  IMPRESSION: Multilevel lumbar spondylosis, as above. Notable findings  are as follows:  1.  Suspect a large sequestered disc fragment lying within the left  lateral and posterior epidural space at the level of the L3 superior  endplate/pedicle causing potentially clinically relevant impingement  of the traversing nerve roots, especially the left L3 nerve root.  2.  High-grade L5-S1 neural foraminal stenosis bilaterally potentially  causing clinically relevant L5 nerve root irritation/impingement.    Patient understood and verbally consented to the treatment plan. Discussed symptoms that would warrant an urgent or emergent visit. All of the patients' questions were answered. Patient was  instructed to contact the clinic if questions or concerns arise. Recommend follow up appointments if symptoms worsen or fail to improve. Recommend follow up as needed. Recommend ER in the case of an emergency.    Clementine Knapp PA-C    Please note: Voice recognition software may have been used in preparing this note, unintended word substitutions may be present.

## 2024-02-06 NOTE — PROGRESS NOTES
"  Assessment & Plan     Chronic bilateral low back pain with left-sided sciatica  - Physical Therapy Referral; Future  - celecoxib (CELEBREX) 200 MG capsule; Take 1 capsule (200 mg) by mouth daily  - diclofenac (VOLTAREN) 1 % topical gel; Apply 2 g topically 4 times daily as needed for moderate pain  - cyclobenzaprine (FLEXERIL) 10 MG tablet; Take 1 tablet (10 mg) by mouth 3 times daily as needed for muscle spasms  - gabapentin (NEURONTIN) 100 MG capsule; Take 1 capsule (100 mg) by mouth 3 times daily as needed for neuropathic pain    Osteoarthritis of spine with radiculopathy, lumbar region    See patient instructions    Prescription drug management  I spent a total of 33 minutes on the day of the visit.   Time spent by me doing chart review, history and exam, documentation and further activities per the note      BMI  Estimated body mass index is 27.02 kg/m  as calculated from the following:    Height as of this encounter: 1.685 m (5' 6.34\").    Weight as of this encounter: 76.7 kg (169 lb 2 oz).         See Patient Instructions  Patient Instructions   Back pain/spondylosis:  Follow-up with your spine specialist as scheduled  Follow-up with your primary care provider    Patient has sciatica; Reviewed the condition with patient.  Discussed symptomatic treatments including use of ice and heat.  Antiinflammatories as needed. Patient had no further questions/concerns and is encouraged to follow up sooner if symptoms are not improved, are worsening, or new symptoms develop.  Recommend going to the ER if pain is unmanageable or if symptoms worsen.    Change you to gabapentin.  May do a trial of taking gabapentin 100 mg 3 times daily as needed for sciatica pain.  This medication will make you tired, do not drive or operate machinery on this medication.    Follow up with physical therapy     Lidocaine patches for 12 hours on, 12 hours off     Tylenol as needed     Stop taking ibuprofen.    You may take Celebrex once daily " "in the morning as needed for pain.  Take this medication with food.  Avoid excessive use of this medication.  This medication can cause stomach irritation and kidney damage.  Stay well-hydrated and drink more than 70 ounces water daily.     Diclofenac gel 4 times a day scheduled     Flexeril (10 mg) muscle relaxer, as needed at bedtime for muscle spasms. This medication will cause sedation, do not drive while taking this medication     Heat pack for 10 minutes followed by exercises 4-5 times a day     Follow up with any new, worsening, or persistent symptoms     Go to the ER in the case of an emergency     Sciatica: Care Instructions  Overview     Sciatica (say \"ulf-MJ-eb-kuh\") is an irritation of one of the sciatic nerves, which come from the spinal cord in the lower back. The sciatic nerves and their branches extend down through the buttock to the foot. Sciatica can develop when an injured disc or arthritis in the back irritates or presses against a spinal nerve root. Its main symptom is pain, numbness, or weakness that is often worse in the leg or foot than in the back.  Sciatica often will improve and go away with time. Early treatment usually includes medicines and exercises to relieve pain.  Follow-up care is a key part of your treatment and safety. Be sure to make and go to all appointments, and call your doctor if you are having problems. It's also a good idea to know your test results and keep a list of the medicines you take.  How can you care for yourself at home?  Take pain medicines exactly as directed.  If the doctor gave you a prescription medicine for pain, take it as prescribed.  If you are not taking a prescription pain medicine, ask your doctor if you can take an over-the-counter medicine.  Use heat or ice to relieve pain.  To apply heat, put a warm water bottle, heating pad set on low, or warm cloth on your back. Do not go to sleep with a heating pad on your skin.  To use ice, put ice or a cold " pack on the area for 10 to 20 minutes at a time. Put a thin cloth between the ice and your skin.  Avoid sitting if possible, unless it feels better than standing.  Alternate lying down with short walks. Increase your walking distance as you are able to without making your symptoms worse.  Do not do anything that makes your symptoms worse.  When should you call for help?   Call 911 anytime you think you may need emergency care. For example, call if:    You are unable to move a leg at all.   Call your doctor now or seek immediate medical care if:    You have new or worse symptoms in your legs or buttocks. Symptoms may include:  Numbness or tingling.  Weakness.  Pain.     You lose bladder or bowel control.   Watch closely for changes in your health, and be sure to contact your doctor if:    You are not getting better as expected.      Sciatica: Exercises  Introduction  Here are some examples of typical rehabilitation exercises for your condition. Start each exercise slowly. Ease off the exercise if you start to have pain.  Your doctor or physical therapist will tell you when you can start these exercises and which ones will work best for you.  When you are not being active, find a comfortable position for rest. Some people are comfortable on the floor or a medium-firm bed with a small pillow under their head and another under their knees. Some people prefer to lie on their side with a pillow between their knees. Don't stay in one position for too long.  Take short walks (10 to 20 minutes) every 2 to 3 hours. Avoid slopes, hills, and stairs until you feel better. Walk only distances you can manage without pain, especially leg pain.  How to do the exercises  Cat-cow    Get on your hands and knees. Your shoulders should be directly above your wrists, and your hips should be above your knees. Your back should be flat, and your neck should extend out straight from your spine. Your gaze should be toward the floor  below.  Relax your head and allow it to droop. Round your back up toward the ceiling until you feel a nice stretch in your upper, middle, and lower back. Hold this stretch for as long as it feels comfortable, or about 15 to 30 seconds.  Then let your back curve down by pressing your stomach toward the floor. Lift your buttocks toward the ceiling. If it doesn't bother your neck, you can raise your head as you allow your back to sway. Hold this position for 15 to 30 seconds.  Go back and forth smoothly 2 to 4 times between the rounded back and swayed back positions.  Follow-up care is a key part of your treatment and safety. Be sure to make and go to all appointments, and call your doctor if you are having problems. It's also a good idea to know your test results and keep a list of the medicines you take.     Patient understood and verbally consented to the treatment plan. Discussed symptoms that would warrant an urgent or emergent visit. All of the patients' questions were answered. Patient was instructed to contact the clinic if questions or concerns arise. Recommend follow up appointments if symptoms worsen or fail to improve. Recommend follow up as needed. Recommend ER in the case of an emergency.     Clementine Knapp PA-C     Please note: Voice recognition software may have been used in preparing this note, unintended word substitutions may be present.    Spondylolysis and Spondylolisthesis: Exercises  Introduction  Here are some examples of exercises for you to try. The exercises may be suggested for a condition or for rehabilitation. Start each exercise slowly. Ease off the exercises if you start to have pain.  You will be told when to start these exercises and which ones will work best for you.  How to do the exercises  Single knee-to-chest    Lie on your back with your knees bent and your feet flat on the floor. You can put a small pillow under your head and neck if it is more comfortable.  Bring one knee to  your chest, keeping the other foot flat on the floor.  Keep your lower back pressed to the floor. Hold for 15 to 30 seconds.  Relax, and lower the knee to the starting position.  Repeat with the other leg. Repeat 2 to 4 times with each leg.  To get more stretch, put your other leg flat on the floor while pulling your knee to your chest.  Double knee-to-chest stretch    Lie on your back with your knees bent and your feet flat on the floor. You can put a small pillow under your head and neck if it is more comfortable.  Bring your knees to your chest and hold them with your hands. Or you can hold at the fronts of your shins or behind your thighs.  Keep your lower back pressed to the floor. Hold the stretch for 15 to 30 seconds.  Relax, and lower your knees to the starting position.  Repeat 2 to 4 times.  Alternate arm and leg (bird dog) exercise    Do this exercise slowly. Try to keep your body straight at all times.  Start on the floor, on your hands and knees.  Tighten your belly muscles by pulling your belly button in toward your spine. Be sure you continue to breathe normally and do not hold your breath.  Raise one arm off the floor, and hold it straight out in front of you. Be careful not to let your shoulder drop down, because that will twist your trunk.  Hold for about 6 seconds, then lower your arm and switch to your other arm.  Repeat 8 to 12 times on each arm.  When you can do this exercise with ease and no pain, repeat steps 1 through 5. But this time do it with one leg raised off the floor, holding your leg straight out behind you. Be careful not to let your hip drop down, because that will twist your trunk.  When holding your leg straight out becomes easier, try raising your opposite arm at the same time, and repeat steps 1 through 5.  Bridging (feet flat)    Lie on your back with both knees bent and your feet flat on the floor. Your knees should be bent about 90 degrees.  Tighten your belly muscles by  pulling your belly button in toward your spine.  Push your feet into the floor, squeeze your buttocks, and lift your hips off the floor until your shoulders, hips, and knees are all in a straight line. Keep your hips level.  Hold about 6 seconds as you continue to breathe normally.  Slowly lower your hips back to the floor.  Repeat 8 to 12 times.  Curl-ups    Lie on the floor on your back with your knees bent at a 90-degree angle. Your feet should be flat on the floor, about 12 inches from your buttocks.  Cross your arms over your chest. If this bothers your neck, try putting your hands behind your neck (not your head), with your elbows spread apart.  Slowly tighten your belly muscles and raise your shoulder blades off the floor.  Keep your head in line with your body, and do not press your chin to your chest.  Hold this position for 1 or 2 seconds, then slowly lower yourself back down to the floor.  Repeat 8 to 12 times.  Plank    Do this exercise slowly. Try to keep your body straight at all times, and do not let one hip drop lower than the other.  Lie on your stomach, resting your upper body on your forearms.  Tighten your belly muscles by pulling your belly button in toward your spine.  Keeping your knees on the floor, press down with your forearms to lift your upper body off the floor.  Hold for about 6 seconds, then lower your body to the floor. Rest for up to 10 seconds.  Repeat 8 to 12 times.  Over time, work up to holding for 15 to 30 seconds each time.  If this exercise is easy to do with your knees on the floor, try doing this exercise with your knees and legs straight, supported by your toes on the floor.  Follow-up care is a key part of your treatment and safety. Be sure to make and go to all appointments, and call your doctor if you are having problems. It's also a good idea to know your test results and keep a list of the medicines you take.  Current as of: July 18, 2023               Content Version:  13.8    [id number] [employer].   Care instructions adapted under license by your healthcare professional. If you have questions about a medical condition or this instruction, always ask your healthcare professional. [employer] disclaims any warranty or liability for your use of this information.    MRI lumbar spine results:  IMPRESSION: Multilevel lumbar spondylosis, as above. Notable findings  are as follows:  1.  Suspect a large sequestered disc fragment lying within the left  lateral and posterior epidural space at the level of the L3 superior  endplate/pedicle causing potentially clinically relevant impingement  of the traversing nerve roots, especially the left L3 nerve root.  2.  High-grade L5-S1 neural foraminal stenosis bilaterally potentially  causing clinically relevant L5 nerve root irritation/impingement.    Patient understood and verbally consented to the treatment plan. Discussed symptoms that would warrant an urgent or emergent visit. All of the patients' questions were answered. Patient was instructed to contact the clinic if questions or concerns arise. Recommend follow up appointments if symptoms worsen or fail to improve. Recommend follow up as needed. Recommend ER in the case of an emergency.    Clementine Knapp PA-C    Please note: Voice recognition software may have been used in preparing this note, unintended word substitutions may be present.      Iftikhar Alejo is a 63 year old, presenting for the following health issues:  Back Pain        2/6/2024     4:29 PM   Additional Questions   Roomed by Maria A     History of Present Illness       Back Pain:  He presents for follow up of back pain. Patient's back pain is a recurring problem.  Location of back pain:  Left lower back  Description of back pain: shooting  Back pain spreads: nowhere    Since patient first noticed back pain, pain is: rapidly worsening  Does back pain interfere with his job:  Yes       He  eats 0-1 servings of fruits and vegetables daily.He consumes 0 sweetened beverage(s) daily.He exercises with enough effort to increase his heart rate 10 to 19 minutes per day.  He exercises with enough effort to increase his heart rate 3 or less days per week.   He is taking medications regularly.     Chart review:    Patient was seen in the ER on 1/30/2024 for acute bilateral low back pain with left-sided sciatica.  MDM from ER:  Medical Decision Making  63 year old male who presents with left low back pain onset 5-6 weeks ago that radiates to his left knee. Was seen in the ED on 1/20/24 with started on methylprednisolone 4 mg, flexeril, and oxycodone 5 mg with good management of his pain until yesterday morning when his pain flared back up. Had appointment with spine clinic yesterday and MRI is scheduled for 2/6/24. No recent falls, trauma, or strain. No fevers, chills, or sweats. No urinary or stool incontinence. No saddle anesthesia. No focal weakness. No diabetes, cancer, or IV drug use.      Vitals are stable, patient is afebrile. On exam, patient is supine laying still but able to move throughout exam. No bony tenderness to lumbar spine. Mild paraspinal muscular tenderness to palpation. Positive straight leg raise at 45 degrees on left side. No focal weakness.      Patient was given IM Dilaudid and IM Toradol with significant relief. Patient was able to ambulate around the emergency room without difficulty. Discussed with patient his symptoms are consistent with sciatica and that he does not have red flag symptoms for an emergent MRI at this time. Discussed his methylprenisolone can be increased to 20 mg and prescribed percocet q6h PRN to hopefully improve his pain management until his MRI scheduled for 2/6/24. Recommended he continue to use Tylenol, ibuprofen, and gabapentin and that he follow up with his PCP and spine specialist as soon as possible. Patient and wife in agreement with the plan. He is to  return to the ED immediately if any new or worsening symptoms. Patient stable for discharge.     E-visit 2/5/2024:  Question 2/5/2024 10:54 AM CST - Filed by Patient   Where are you having pain Lower Back   Does the pain extend into your legs? Yes, into my left leg   How bad is the pain? The pain is severe   Did you have an injury that caused the pain? No, I cannot remember an injury   How long has the pain been present? More than 1 week but less then 4 weeks   Have you had back pain in the past? I have had back pain before, but this is markedly different   Please list any medications you have previously taken for back pain. Percocet   Do you have a fever? No, I do not have a fever   Do you have any of the following? None of the above   What makes the pain worse? Any movement    Bending over    Sitting down    Strenuous activity   What makes the pain better? Pain medicine   Have you ever been diagnosed with cancer? No   Have you ever been diagnosed with arthritis? No   Have you ever been diagnosed with osteoporosis or any other bone weakness? No   Have you ever had surgery on your back or spine? Yes   What is your usual health status? I am active and can move normally   Wrap up    Anything else you would like to add? I have been seen twice in [place] emergency room and received two injections. The first time they gave me oxycodone, and it really did not do anything I returned, and they gave me Percocet which worked very well. I have had my appointment with the spine clinic in [place] and I am awaiting a MRI Tomorrow at [place] and then I return to the spine clinic on Friday I am wondering could you please renew my Percocet until they give me a diagnosis on what they are going to be doing with my back I have used Tylenol and used ibuprofen without any help, but the Percocet has helped me tremendously  Please let me know your thoughts. Thank you very much I appreciate it.     Imaging completed today x-ray of the  lumbar spine, x-ray of the knees bilaterally, and MRI of the lower spine.    Telephone encounter 2/6/2024:  PSP:  Dr. Lopez  Last clinic visit:  1/29/24 Consult  Reason for call: Medication Management  Clinical information:  Call received from pt. Pt completed MR lumbar spine, XR lumbar spine and XR knees bilateral as recommended by Dr. Lopez (exam ended but final results not back yet). He has follow-up appointment on Friday 2/9 to review findings but he inquires about medication management in the meantime.   Pt states he has been the ED 2 times now due to severe pain. Pt was given a medrol dose christelle that he has completed and reports no relief with this. He takes gabapentin 300 mg at bedtime with no reported side effects. He has tried Oxycodone which was not effective. He takes Percocet which he states is effective. No OTC meds.   Advice given to patient: Informed pt that provider would be updated with his status. Pharmacy verified as [facility] [place], [place] on file should any medication be prescribed.   Provider to address: Please advise if any recommendations prior to appt on Friday 2/9      Please advise patient that due to potential interactions with his existing medications, there are no medications we can prescribe safely without evaluating him first. We do not manage opioid medications here so we cannot manage oxycodone or percocet. Our recommendation would be to come in as scheduled as that is our earliest available appointment, or if the pain is severe and uncontrollable then he should go to an ER for evaluation.     [employer] message 2/6/2024:  Can the Doctor please refill my pain medication until we meet on Friday?  I have been taking Percocet and it has been working very well for me    Anil Alejo,     As we had discuss this morning, we do not manage opioid medications here at our clinic so we would not be able to prescribe this for you.      If the pain is severe and uncontrollable then you would  need to go to the ER for evaluation. You could also check in with your primary care provider to see if they have any other recommendations for you.      Thank you,   Mignon, RN  [facility] Nurse Navigation team    Mignon Miller this is Melo s wife   I did not know he had asked  So timothy for the inconvenience   Thank you  Christelle    MRI lumbar spine results:  IMPRESSION: Multilevel lumbar spondylosis, as above. Notable findings  are as follows:  1.  Suspect a large sequestered disc fragment lying within the left  lateral and posterior epidural space at the level of the L3 superior  endplate/pedicle causing potentially clinically relevant impingement  of the traversing nerve roots, especially the left L3 nerve root.  2.  High-grade L5-S1 neural foraminal stenosis bilaterally potentially  causing clinically relevant L5 nerve root irritation/impingement.        Today in office 2/6/2024:  Discussed patient's prior back pain, visit to the ER, and back pain history as noted above.  He has been to the ER twice.  He reports that he received injections for pain.  He was provided with Percocet he is asking for refills and states this works well for him.  He is asking for enough medication to get him through to his appointment on Friday where he will be seeing a spine specialist.  He reports left-sided lower back pain and left-sided sciatica.  Reports pain is constant.  Pain at the moment rated 4 out of 10.  He has had back pain since 2009 and notes that he started to have disc problems at that time.  He did physical therapy years ago, no recent physical therapy or exercises.  He is able to ambulate without difficulty.  He has not had any foot drop, falls, trauma to the back, bowel incontinence, bladder incontinence, fever, chills, cancer history or other red flag type symptoms.  In the ER he was provided with muscle relaxers and gabapentin.  He said gabapentin 3 mg at bedtime is unsure if it is helpful.  He has not tried taking  "gabapentin during the day.  Patient is asking for refills on his Percocet.  He is also asking about his MRI result from today.  We discussed his MRI results.              Review of Systems  Constitutional, HEENT, cardiovascular, pulmonary, GI, , musculoskeletal, neuro, skin, endocrine and psych systems are negative, except as otherwise noted.      Objective    /62   Pulse 106   Temp 97.3  F (36.3  C) (Temporal)   Resp 18   Ht 1.685 m (5' 6.34\")   Wt 76.7 kg (169 lb 2 oz)   SpO2 100%   BMI 27.02 kg/m    Body mass index is 27.02 kg/m .  Physical Exam   GENERAL: alert and no distress  EYES: Eyes grossly normal to inspection, PERRL and conjunctivae and sclerae normal  RESP: lungs clear to auscultation - no rales, rhonchi or wheezes  CV: regular rate and rhythm, normal S1 S2, no S3 or S4, no murmur, click or rub,   MS: no gross musculoskeletal defects noted, no edema.  Spinous processes are negative for tenderness palpation.  Positive for palpable muscle spasm and tenderness palpation over the left lower back.  Otherwise negative for paraspinal muscle tenderness.  SKIN: no suspicious lesions or rashes  NEURO: Normal strength and tone, mentation intact and speech normal.  Walking without difficulty.  No limp noted.  PSYCH: mentation appears normal, affect normal/bright    Office Visit on 03/23/2022   Component Date Value Ref Range Status    Prostate Specific Antigen Screen 03/23/2022 0.92  0.00 - 4.00 ug/L Final    Cholesterol 03/23/2022 139  <200 mg/dL Final    Triglycerides 03/23/2022 55  <150 mg/dL Final    Direct Measure HDL 03/23/2022 73  >=40 mg/dL Final    LDL Cholesterol Calculated 03/23/2022 55  <=100 mg/dL Final    Non HDL Cholesterol 03/23/2022 66  <130 mg/dL Final    Patient Fasting > 8hrs? 03/23/2022 Yes   Final    Color Urine 03/23/2022 Yellow  Colorless, Straw, Light Yellow, Yellow Final    Appearance Urine 03/23/2022 Clear  Clear Final    Glucose Urine 03/23/2022 Negative  Negative mg/dL " Final    Bilirubin Urine 03/23/2022 Negative  Negative Final    Ketones Urine 03/23/2022 Negative  Negative mg/dL Final    Specific Gravity Urine 03/23/2022 1.025  1.003 - 1.035 Final    Blood Urine 03/23/2022 Negative  Negative Final    pH Urine 03/23/2022 6.0  5.0 - 7.0 Final    Protein Albumin Urine 03/23/2022 Negative  Negative mg/dL Final    Urobilinogen Urine 03/23/2022 Normal  Normal, 2.0 mg/dL Final    Nitrite Urine 03/23/2022 Negative  Negative Final    Leukocyte Esterase Urine 03/23/2022 Negative  Negative Final    Sodium 03/23/2022 139  133 - 144 mmol/L Final    Potassium 03/23/2022 4.3  3.4 - 5.3 mmol/L Final    Chloride 03/23/2022 107  94 - 109 mmol/L Final    Carbon Dioxide (CO2) 03/23/2022 29  20 - 32 mmol/L Final    Anion Gap 03/23/2022 3  3 - 14 mmol/L Final    Urea Nitrogen 03/23/2022 18  7 - 30 mg/dL Final    Creatinine 03/23/2022 0.90  0.66 - 1.25 mg/dL Final    Calcium 03/23/2022 8.9  8.5 - 10.1 mg/dL Final    Glucose 03/23/2022 100 (H)  70 - 99 mg/dL Final    Alkaline Phosphatase 03/23/2022 55  40 - 150 U/L Final    AST 03/23/2022 19  0 - 45 U/L Final    ALT 03/23/2022 41  0 - 70 U/L Final    Protein Total 03/23/2022 6.8  6.8 - 8.8 g/dL Final    Albumin 03/23/2022 3.8  3.4 - 5.0 g/dL Final    Bilirubin Total 03/23/2022 0.7  0.2 - 1.3 mg/dL Final    GFR Estimate 03/23/2022 >90  >60 mL/min/1.73m2 Final    Effective December 21, 2021 eGFRcr in adults is calculated using the 2021 CKD-EPI creatinine equation which includes age and gender (Kwasi et al., NEJM, DOI: 10.1056/PMTQom4671013)    HIV Antigen Antibody Combo 03/23/2022 Nonreactive  Nonreactive Final    HIV-1 p24 Ag & HIV-1/HIV-2 Ab Not Detected    Hepatitis C Antibody 03/23/2022 Nonreactive  Nonreactive Final    WBC Count 03/23/2022 5.7  4.0 - 11.0 10e3/uL Final    RBC Count 03/23/2022 4.49  4.40 - 5.90 10e6/uL Final    Hemoglobin 03/23/2022 14.4  13.3 - 17.7 g/dL Final    Hematocrit 03/23/2022 43.0  40.0 - 53.0 % Final    MCV 03/23/2022 96   78 - 100 fL Final    MCH 03/23/2022 32.1  26.5 - 33.0 pg Final    MCHC 03/23/2022 33.5  31.5 - 36.5 g/dL Final    RDW 03/23/2022 12.9  10.0 - 15.0 % Final    Platelet Count 03/23/2022 275  150 - 450 10e3/uL Final    % Neutrophils 03/23/2022 55  % Final    % Lymphocytes 03/23/2022 27  % Final    % Monocytes 03/23/2022 9  % Final    % Eosinophils 03/23/2022 8  % Final    % Basophils 03/23/2022 1  % Final    % Immature Granulocytes 03/23/2022 0  % Final    NRBCs per 100 WBC 03/23/2022 0  <1 /100 Final    Absolute Neutrophils 03/23/2022 3.1  1.6 - 8.3 10e3/uL Final    Absolute Lymphocytes 03/23/2022 1.6  0.8 - 5.3 10e3/uL Final    Absolute Monocytes 03/23/2022 0.5  0.0 - 1.3 10e3/uL Final    Absolute Eosinophils 03/23/2022 0.4  0.0 - 0.7 10e3/uL Final    Absolute Basophils 03/23/2022 0.1  0.0 - 0.2 10e3/uL Final    Absolute Immature Granulocytes 03/23/2022 0.0  <=0.4 10e3/uL Final    Absolute NRBCs 03/23/2022 0.0  10e3/uL Final     Results for orders placed or performed during the hospital encounter of 02/06/24   XR Lumbar Spine w Bending Comp G/E 6 Views     Status: None    Narrative    LUMBAR SPINE WITH BENDING COMPLETE SIX OR MORE VIEWS  2/6/2024 7:59 AM      HISTORY: Lumbar radiculopathy.    COMPARISON: Same-day lumbar spine MRI.       Impression    IMPRESSION: Five lumbar type vertebral body numbering convention.  Relative straightening of the vertebral body alignment. Stepwise  L1-L2, L2-L3, L3-L4, L4-L5 and L5-S1 degenerative trace  retrolisthesis. No significant change in vertebral body alignment in  flexion and/or extension positioning. Otherwise preserved vertebral  alignment. Preserved vertebral body heights. Mild to moderate  degenerative disc and opposing endplate changes most pronounced at  L4-L5 and L5-S1. No acute finding in the visualized extraspinal  structures.    CAREN SOUTH DO         SYSTEM ID:  [id number]   Results for orders placed or performed during the hospital encounter of 02/06/24   XR  Knee Bilateral 3 Views     Status: None    Narrative    KNEE THREE VIEWS BILATERAL  2/6/2024 7:59 AM     HISTORY: Chronic pain of right knee  COMPARISON: None.      Impression    IMPRESSION: No acute fracture or malalignment. There is normal joint  spacing bilaterally. No knee joint effusion.    JOHN RUBIO MD         SYSTEM ID:  [id number]   Results for orders placed or performed during the hospital encounter of 02/06/24   MR Lumbar Spine w/o Contrast     Status: None    Narrative    MR LUMBAR SPINE WITHOUT CONTRAST  2/6/2024 8:41 AM     HISTORY: Lumbar radiculopathy. Degeneration of lumbar intervertebral  disc.       COMPARISON: 4/3/2009    TECHNIQUE: Multiplanar, multisequence MR images of the lumbar spine  were obtained without intravenous contrast.    CONTRAST: None.    FINDINGS:  There are five lumbar type vertebrae, used for the purposes  of this dictation. Straightening of the vertebral body alignment. No  signal changes suggesting acute fracture or traumatic subluxation. No  suspicious marrow lesion. Conus tip at approximately L1. Bilateral  parapelvic benign-appearing renal cysts Nonfocal extraspinal  structures.     On a level by level basis, the findings are as follows:    L1-L2: Preserved disc height and signal for patient age. Symmetric  disc bulge. Normal facets for patient age. No significant spinal canal  or neural foraminal stenosis.    L2-L3: Mild loss of disc height, intradiscal T2 signal. Symmetric disc  bulge. Bilateral facet arthropathy. L3 superior endplate/pedicle level  T2 intermediate to high signal heterogeneous mass lying within the  left lateral and left posterior epidural space measuring 7 x 12 x 16  mm causing anterior and rightward effacement of the thecal sac and  rightward deviation of the cauda equina nerve roots, as well as  compression of the traversing left L3 nerve root against the posterior  left vertebral body cortical margin. No high-grade neural  foraminal  stenosis.    L3-L4: Mild loss of disc height, intradiscal T2 signal. Posterior disc  osteophyte complex and bilateral facet arthropathy, left greater than  right, causing moderate left and mild right neural foraminal stenosis.  No high-grade spinal canal stenosis.    L4-L5: Moderate loss of disc height, intradiscal T2 signal. Posterior  disc osteophyte complex and bilateral facet arthropathy cause moderate  bilateral neural foraminal stenosis. No high-grade spinal canal  stenosis.    L5-S1: Moderate loss of disc height, intradiscal T2 signal. Posterior  disc osteophyte complex and bilateral facet arthropathy cause severe  bilateral neural foraminal stenosis. No high-grade spinal canal  stenosis.      Impression    IMPRESSION: Multilevel lumbar spondylosis, as above. Notable findings  are as follows:  1.  Suspect a large sequestered disc fragment lying within the left  lateral and posterior epidural space at the level of the L3 superior  endplate/pedicle causing potentially clinically relevant impingement  of the traversing nerve roots, especially the left L3 nerve root.  2.  High-grade L5-S1 neural foraminal stenosis bilaterally potentially  causing clinically relevant L5 nerve root irritation/impingement.    CAREN SOUTH DO         SYSTEM ID:  [id number]     MR Lumbar Spine w/o Contrast    Result Date: 2/6/2024  MR LUMBAR SPINE WITHOUT CONTRAST  2/6/2024 8:41 AM HISTORY: Lumbar radiculopathy. Degeneration of lumbar intervertebral disc.   COMPARISON: 4/3/2009 TECHNIQUE: Multiplanar, multisequence MR images of the lumbar spine were obtained without intravenous contrast. CONTRAST: None. FINDINGS:  There are five lumbar type vertebrae, used for the purposes of this dictation. Straightening of the vertebral body alignment. No signal changes suggesting acute fracture or traumatic subluxation. No suspicious marrow lesion. Conus tip at approximately L1. Bilateral parapelvic benign-appearing renal cysts Nonfocal  extraspinal structures. On a level by level basis, the findings are as follows: L1-L2: Preserved disc height and signal for patient age. Symmetric disc bulge. Normal facets for patient age. No significant spinal canal or neural foraminal stenosis. L2-L3: Mild loss of disc height, intradiscal T2 signal. Symmetric disc bulge. Bilateral facet arthropathy. L3 superior endplate/pedicle level T2 intermediate to high signal heterogeneous mass lying within the left lateral and left posterior epidural space measuring 7 x 12 x 16 mm causing anterior and rightward effacement of the thecal sac and rightward deviation of the cauda equina nerve roots, as well as compression of the traversing left L3 nerve root against the posterior left vertebral body cortical margin. No high-grade neural foraminal stenosis. L3-L4: Mild loss of disc height, intradiscal T2 signal. Posterior disc osteophyte complex and bilateral facet arthropathy, left greater than right, causing moderate left and mild right neural foraminal stenosis. No high-grade spinal canal stenosis. L4-L5: Moderate loss of disc height, intradiscal T2 signal. Posterior disc osteophyte complex and bilateral facet arthropathy cause moderate bilateral neural foraminal stenosis. No high-grade spinal canal stenosis. L5-S1: Moderate loss of disc height, intradiscal T2 signal. Posterior disc osteophyte complex and bilateral facet arthropathy cause severe bilateral neural foraminal stenosis. No high-grade spinal canal stenosis.     IMPRESSION: Multilevel lumbar spondylosis, as above. Notable findings are as follows: 1.  Suspect a large sequestered disc fragment lying within the left lateral and posterior epidural space at the level of the L3 superior endplate/pedicle causing potentially clinically relevant impingement of the traversing nerve roots, especially the left L3 nerve root. 2.  High-grade L5-S1 neural foraminal stenosis bilaterally potentially causing clinically relevant L5  nerve root irritation/impingement. CAREN SOUTH DO   SYSTEM ID:  [id number]    XR Knee Bilateral 3 Views    Result Date: 2/6/2024  KNEE THREE VIEWS BILATERAL  2/6/2024 7:59 AM HISTORY: Chronic pain of right knee COMPARISON: None.     IMPRESSION: No acute fracture or malalignment. There is normal joint spacing bilaterally. No knee joint effusion. JOHN RUBIO MD   SYSTEM ID:  [id number]    XR Lumbar Spine w Bending Comp G/E 6 Views    Result Date: 2/6/2024  LUMBAR SPINE WITH BENDING COMPLETE SIX OR MORE VIEWS  2/6/2024 7:59 AM HISTORY: Lumbar radiculopathy. COMPARISON: Same-day lumbar spine MRI.     IMPRESSION: Five lumbar type vertebral body numbering convention. Relative straightening of the vertebral body alignment. Stepwise L1-L2, L2-L3, L3-L4, L4-L5 and L5-S1 degenerative trace retrolisthesis. No significant change in vertebral body alignment in flexion and/or extension positioning. Otherwise preserved vertebral alignment. Preserved vertebral body heights. Mild to moderate degenerative disc and opposing endplate changes most pronounced at L4-L5 and L5-S1. No acute finding in the visualized extraspinal structures. CAREN SOUTH DO   SYSTEM ID:  [id number]         Signed Electronically by: Clementine Kanpp PA-C

## 2024-02-09 ENCOUNTER — OFFICE VISIT (OUTPATIENT)
Dept: PHYSICAL MEDICINE AND REHAB | Facility: CLINIC | Age: 64
End: 2024-02-09
Payer: COMMERCIAL

## 2024-02-09 VITALS
BODY MASS INDEX: 27.16 KG/M2 | HEIGHT: 66 IN | DIASTOLIC BLOOD PRESSURE: 82 MMHG | WEIGHT: 169 LBS | SYSTOLIC BLOOD PRESSURE: 126 MMHG

## 2024-02-09 DIAGNOSIS — M54.16 LUMBAR RADICULOPATHY: Primary | ICD-10-CM

## 2024-02-09 PROCEDURE — 99214 OFFICE O/P EST MOD 30 MIN: CPT | Performed by: STUDENT IN AN ORGANIZED HEALTH CARE EDUCATION/TRAINING PROGRAM

## 2024-02-09 ASSESSMENT — PAIN SCALES - GENERAL: PAINLEVEL: MILD PAIN (3)

## 2024-02-09 NOTE — LETTER
2/9/2024         RE: Melo Brewer  [address]  [place] [address]        Dear Colleague,    Thank you for referring your patient, Melo Brewer, to the [facility]. Please see a copy of my visit note below.    ASSESSMENT:  Melo Brewer is a 63 year old male presents for consultation at the request of Vteo Scanlon who presents today for new patient evaluation of :         Diagnoses and all orders for this visit:  Lumbar radiculopathy  -     PAIN Transforaminal ANNALEE Inj Lumbosacral One Level Left; Future       Patient is neurologically intact on exam. No myelopathic or red flag symptoms.    Patient presents for follow-up and imaging review related to his acute on chronic low back pain and left lumbar radiculopathy in approximately the L4 distribution.  His symptoms are unchanged, and MRI shows a disc osteophyte complex with stenosis of the L4-L5 level, which could correlate to his area of symptoms.  Patient and I reviewed his MRI together, and we discussed potential interventional treatment options versus surgical consultation.  Patient and his wife would prefer to exhaust conservative options before proceeding to surgery if that is required, and to that end we discussed proceeding with a TFESI as both a diagnostic and therapeutic modality.  Patient is in agreement with this plan    PLAN:  Reviewed spine anatomy and disease process. Discussed diagnosis and treatment options with the patient today. A shared decision making model was used. The patient's values and choices were respected. The following represents what was discussed and decided upon by the provider and the patient.    1. DIAGNOSTIC TESTS  No new imaging orders at this time.    2. PHYSICAL THERAPY  Patient is not a candidate for PT right now due to inability to tolerate therapy due to pain level.  Plan to follow injection with therapy  Discussed the importance of core strengthening, ROM, stretching  exercises with the patient and how each of these entities is important in decreasing pain.  Explained to the patient that the purpose of physical therapy is to teach the patient a home exercise program.  These exercises need to be performed every day in order to decrease pain and prevent future occurrences of pain.  Likened it to brushing one's teeth.      3. MEDICATIONS:  Discussed multiple medication options today with patient. Discussed risks, side effects, and proper use of medications. Patient verbalized understanding.  Patient is doing well with current gabapentin dose of 100mg TID, continue at current dose.  Patient advised to call our clinic's nurse navigation line (number provided) if they experience any side effects or intolerances with prescribed medication.    4. INTERVENTIONS:  Left L4-L5 Transforaminal Epidural Steroid Injection  Benefits of epidural steroid injection were reviewed including potential improvements in pain, function, and sleep. Potential risks also reviewed, including but not limited to bleeding, bruising, infection, increased or non-improvement of pain, injury to local structures, spinal infection or bleeding, nerve or spinal cord injury which could be temporary or permanent. Risks of steroid use can include increased blood sugar or blood pressure, hormonal disruption, increased fracture risk. After reviewing the risks, benefits, and alternatives, the patient was given an opportunity to ask questions. All questions were addressed, and the patient wishes to move forward with the selected injection.    5. OTHER REFERRALS:  No other referrals at this time.    6. FOLLOW-UP  In approximately 2-4 weeks to assess response to injection.  Patient advised to contact our office for earlier appointment if symptoms worsening or not improving, or any side effects are noticed.  Follow up once imaging is completed    Advised patient to call the Spine Center if symptoms worsen or you have problems  controlling bladder and bowel function.   ______________________________________________________________________    SUBJECTIVE:   Melo Brewer  is a 63 year old male who presents today for follow-up evaluation.    Patient reports no change in his pain.  Continues to be across the low back but worse on the left side, and radiates down the lateral left leg to the knee.  Associated with paresthesias and tingling in the same area, no symptoms below the knee.  No similar symptoms on the right side. No maggi numbness, weakness, or bladder/bowel incontinence. No prior injections or surgeries.    -Treatment to Date: Medication management, activity modification      Oswestry (GALINA) Questionnaire         No data to display                Neck Disability Index:       No data to display                       -Medications:    Current Outpatient Medications   Medication     acetaminophen (TYLENOL) 650 MG CR tablet     atorvastatin (LIPITOR) 80 MG tablet     celecoxib (CELEBREX) 200 MG capsule     cyclobenzaprine (FLEXERIL) 10 MG tablet     diclofenac (VOLTAREN) 1 % topical gel     escitalopram (LEXAPRO) 10 MG tablet     gabapentin (NEURONTIN) 100 MG capsule     ibuprofen (ADVIL/MOTRIN) 200 MG tablet     lisinopril (ZESTRIL) 10 MG tablet     methylPREDNISolone (MEDROL DOSEPAK) 4 MG tablet therapy pack     metoprolol succinate ER (TOPROL XL) 25 MG 24 hr tablet     oxyCODONE (ROXICODONE) 5 MG tablet     predniSONE (DELTASONE) 20 MG tablet     SM ASPIRIN LOW DOSE 81 MG EC tablet     No current facility-administered medications for this visit.       Allergies   Allergen Reactions     Apple Juice Anaphylaxis     Bees Anaphylaxis       No past medical history on file.     Patient Active Problem List   Diagnosis     CARDIOVASCULAR SCREENING; LDL GOAL LESS THAN 130     Carotid artery stenosis       Past Surgical History:   Procedure Laterality Date     BACK SURGERY       COLONOSCOPY N/A 5/19/2017    Procedure: COLONOSCOPY;  Colonoscopy;   "Surgeon: Robert Elmore MD;  Location: [facility]     ENDARTERECTOMY CAROTID Right 2/26/2019    Procedure: RIGHT CAROTID ENDARTERECTOMY WITH EEG AND INTRAOPERATIVE ULTRASOUND;  Surgeon: Sae Ramos MD;  Location: [facility]     HC EXCIS PRIMARY GANGLION WRIST  12/23/2004    Left wrist     HC INJ EPIDURAL LUMBAR/SACRAL W/WO CONTRAST  2009     HC REMOVAL OF TONSILS,<12 Y/O  1967    Tonsils <13y.o.     SURGICAL HISTORY OF -   4/24/2009    Left C6-C7 Microdiskectomy.       Family History   Problem Relation Age of Onset     Hypertension Father         on medication HTN     Hyperlipidemia Father      Hypertension Brother        Reviewed past medical, surgical, and family history with patient found on new patient intake packet located in EMR Media tab.     SOCIAL HX: Reviewed    ROS: Specifically negative for bowel/bladder dysfunction, balance changes, headache, dizziness, foot drop, fevers, chills, appetite changes, nausea/vomiting, unexplained weight loss. Otherwise 13 systems reviewed are negative. Please see the patient's intake questionnaire from today for details.    OBJECTIVE:  /82 (BP Location: Right arm, Patient Position: Sitting, Cuff Size: Adult Regular)   Ht 5' 6\" (1.676 m)   Wt 169 lb (76.7 kg)   BMI 27.28 kg/m      PHYSICAL EXAMINATION:  --CONSTITUTIONAL: Vital signs as above. No acute distress. The patient is well nourished and well groomed.  --PSYCHIATRIC: The patient is awake, alert, oriented to person, place, time and answering questions appropriately with clear speech. Appropriate mood and affect   --RESPIRATORY: Normal rhythm and effort. No abnormal accessory muscle breathing patterns noted.   --GROSS MOTOR: Easily arises from a seated position.  --LUMBAR SPINE: Inspection reveals no evidence of deformity. Range of motion is not limited in flexion, extension, lateral rotation. Mild tenderness to palpation of lumbar paraspinals, no tenderness in spinous processes.  Facet loading (Mustafa test) " positive on left, seated SLR positive on left  --HIPS: Full range of motion bilaterally. Negative KEMI test.  --LOWER EXTREMITY MOTOR TESTING:  Hip flexion left 5/5, right 5/5  Knee extension left 5/5, right 5/5  Ankle dorsiflexors left 5/5, right 5/5  Ankle plantarflexors left 5/5, right 5/5   Great toe extension left 5/5, right 5/5   Knee flexion left 5/5, right 5/5  --NEUROLOGIC: Sensation to lower extremities is intact bilaterally in L2-S1 dermatomes.  Negative Hunt's bilaterally. Reflexes intact in BLE, no clonus.  --VASCULAR: Warm upper limbs bilaterally. Capillary refill in the upper extremities is less than 1 second.    RESULTS: Available medical records from [facility] and any other outside records were reviewed today.     Imaging:  Available relevant imaging was personally reviewed and interpreted today. The images were shown to the patient and the findings were explained using a spine model.    MRI Lumbar Spine 2/6/24:  IMPRESSION: Multilevel lumbar spondylosis, as above. Notable findings  are as follows:  1.  Suspect a large sequestered disc fragment lying within the left  lateral and posterior epidural space at the level of the L3 superior  endplate/pedicle causing potentially clinically relevant impingement  of the traversing nerve roots, especially the left L3 nerve root.  2.  High-grade L5-S1 neural foraminal stenosis bilaterally potentially  causing clinically relevant L5 nerve root irritation/impingement.       Again, thank you for allowing me to participate in the care of your patient.        Sincerely,        Andrew Lopez MD

## 2024-02-09 NOTE — PATIENT INSTRUCTIONS
An injection has been ordered today to potentially help with your pain symptoms. These injections do not fix what is going on in your back, therefore they typically do not take away the pain completely, however they can many times help improve symptoms. Injections should always be completed along with other modalities such as physical therapy for the best long term outcomes. If injections alone are done, then pain will likely return.     [facility] Injection Requirements    A  is required for all fluoroscopically-guided injections.  Injection appointments may be cancelled if there are signs/symptoms of an active infection or if the patient is being actively treated with antibiotics for a diagnosed infection.  Patients may have their steroid injection cancelled if they have had another steroid injection within 2 weeks.  Diabetic patients will have their blood glucose levels checked the day of their injection and the appointment will be rescheduled if the blood glucose level is 300 or higher.  Patients with allergies to cortisone, local anesthetics, iodine, or contrast dye should contact the Sp[facility] Center to further discuss these considerations.  Patients scheduled for medial branch block diagnostic injections should refrain from taking pain medication the day of the procedure.  The medial branch block injection appointment will be rescheduled if the patient's pain rating is not 5/10 or greater at the time of the procedure.  Patients taking warfarin/Coumadin will have their INR checked the day of the procedure and the procedure may be rescheduled if the INR is greater than 3.0.  Please contact the Sp[facility] Center (#[phone]) if you are taking any prescription blood-thinning medications (warfarin, Plavix, Lovenox, Eliquis, Brilinta, Effient, etc.) as special dosing adjustments may need to be made depending on the type of injection you are scheduled to receive.  It is recommended that  you delay having your steroid injection if you have received a flu shot or shingles vaccine within 2 weeks.

## 2024-02-09 NOTE — PROGRESS NOTES
ASSESSMENT:  Melo Brewer is a 63 year old male presents for consultation at the request of Veto Scanlon who presents today for new patient evaluation of :         Diagnoses and all orders for this visit:  Lumbar radiculopathy  -     PAIN Transforaminal ANNALEE Inj Lumbosacral One Level Left; Future       Patient is neurologically intact on exam. No myelopathic or red flag symptoms.    Patient presents for follow-up and imaging review related to his acute on chronic low back pain and left lumbar radiculopathy in approximately the L4 distribution.  His symptoms are unchanged, and MRI shows a disc osteophyte complex with stenosis of the L4-L5 level, which could correlate to his area of symptoms.  Patient and I reviewed his MRI together, and we discussed potential interventional treatment options versus surgical consultation.  Patient and his wife would prefer to exhaust conservative options before proceeding to surgery if that is required, and to that end we discussed proceeding with a TFESI as both a diagnostic and therapeutic modality.  Patient is in agreement with this plan    PLAN:  Reviewed spine anatomy and disease process. Discussed diagnosis and treatment options with the patient today. A shared decision making model was used. The patient's values and choices were respected. The following represents what was discussed and decided upon by the provider and the patient.    1. DIAGNOSTIC TESTS  No new imaging orders at this time.    2. PHYSICAL THERAPY  Patient is not a candidate for PT right now due to inability to tolerate therapy due to pain level.  Plan to follow injection with therapy  Discussed the importance of core strengthening, ROM, stretching exercises with the patient and how each of these entities is important in decreasing pain.  Explained to the patient that the purpose of physical therapy is to teach the patient a home exercise program.  These exercises need to be performed every day in order  to decrease pain and prevent future occurrences of pain.  Likened it to brushing one's teeth.      3. MEDICATIONS:  Discussed multiple medication options today with patient. Discussed risks, side effects, and proper use of medications. Patient verbalized understanding.  Patient is doing well with current gabapentin dose of 100mg TID, continue at current dose.  Patient advised to call our clinic's nurse navigation line (number provided) if they experience any side effects or intolerances with prescribed medication.    4. INTERVENTIONS:  Left L4-L5 Transforaminal Epidural Steroid Injection  Benefits of epidural steroid injection were reviewed including potential improvements in pain, function, and sleep. Potential risks also reviewed, including but not limited to bleeding, bruising, infection, increased or non-improvement of pain, injury to local structures, spinal infection or bleeding, nerve or spinal cord injury which could be temporary or permanent. Risks of steroid use can include increased blood sugar or blood pressure, hormonal disruption, increased fracture risk. After reviewing the risks, benefits, and alternatives, the patient was given an opportunity to ask questions. All questions were addressed, and the patient wishes to move forward with the selected injection.    5. OTHER REFERRALS:  No other referrals at this time.    6. FOLLOW-UP  In approximately 2-4 weeks to assess response to injection.  Patient advised to contact our office for earlier appointment if symptoms worsening or not improving, or any side effects are noticed.  Follow up once imaging is completed    Advised patient to call the Spine Center if symptoms worsen or you have problems controlling bladder and bowel function.   ______________________________________________________________________    SUBJECTIVE:   Melo DE SANTIAGO Jooricardo  is a 63 year old male who presents today for follow-up evaluation.    Patient reports no change in his pain.   Continues to be across the low back but worse on the left side, and radiates down the lateral left leg to the knee.  Associated with paresthesias and tingling in the same area, no symptoms below the knee.  No similar symptoms on the right side. No maggi numbness, weakness, or bladder/bowel incontinence. No prior injections or surgeries.    -Treatment to Date: Medication management, activity modification      Oswestry (GALINA) Questionnaire         No data to display                Neck Disability Index:       No data to display                       -Medications:    Current Outpatient Medications   Medication    acetaminophen (TYLENOL) 650 MG CR tablet    atorvastatin (LIPITOR) 80 MG tablet    celecoxib (CELEBREX) 200 MG capsule    cyclobenzaprine (FLEXERIL) 10 MG tablet    diclofenac (VOLTAREN) 1 % topical gel    escitalopram (LEXAPRO) 10 MG tablet    gabapentin (NEURONTIN) 100 MG capsule    ibuprofen (ADVIL/MOTRIN) 200 MG tablet    lisinopril (ZESTRIL) 10 MG tablet    methylPREDNISolone (MEDROL DOSEPAK) 4 MG tablet therapy pack    metoprolol succinate ER (TOPROL XL) 25 MG 24 hr tablet    oxyCODONE (ROXICODONE) 5 MG tablet    predniSONE (DELTASONE) 20 MG tablet    SM ASPIRIN LOW DOSE 81 MG EC tablet     No current facility-administered medications for this visit.       Allergies   Allergen Reactions    Apple Juice Anaphylaxis    Bees Anaphylaxis       No past medical history on file.     Patient Active Problem List   Diagnosis    CARDIOVASCULAR SCREENING; LDL GOAL LESS THAN 130    Carotid artery stenosis       Past Surgical History:   Procedure Laterality Date    BACK SURGERY      COLONOSCOPY N/A 5/19/2017    Procedure: COLONOSCOPY;  Colonoscopy;  Surgeon: Robert Elmore MD;  Location: [facility]    ENDARTERECTOMY CAROTID Right 2/26/2019    Procedure: RIGHT CAROTID ENDARTERECTOMY WITH EEG AND INTRAOPERATIVE ULTRASOUND;  Surgeon: Sae Ramos MD;  Location: [facility]    HC EXCIS PRIMARY GANGLION WRIST  12/23/2004     "Left wrist    HC INJ EPIDURAL LUMBAR/SACRAL W/WO CONTRAST  2009    HC REMOVAL OF TONSILS,<12 Y/O  1967    Tonsils <13y.o.    SURGICAL HISTORY OF -   4/24/2009    Left C6-C7 Microdiskectomy.       Family History   Problem Relation Age of Onset    Hypertension Father         on medication HTN    Hyperlipidemia Father     Hypertension Brother        Reviewed past medical, surgical, and family history with patient found on new patient intake packet located in EMR Media tab.     SOCIAL HX: Reviewed    ROS: Specifically negative for bowel/bladder dysfunction, balance changes, headache, dizziness, foot drop, fevers, chills, appetite changes, nausea/vomiting, unexplained weight loss. Otherwise 13 systems reviewed are negative. Please see the patient's intake questionnaire from today for details.    OBJECTIVE:  /82 (BP Location: Right arm, Patient Position: Sitting, Cuff Size: Adult Regular)   Ht 5' 6\" (1.676 m)   Wt 169 lb (76.7 kg)   BMI 27.28 kg/m      PHYSICAL EXAMINATION:  --CONSTITUTIONAL: Vital signs as above. No acute distress. The patient is well nourished and well groomed.  --PSYCHIATRIC: The patient is awake, alert, oriented to person, place, time and answering questions appropriately with clear speech. Appropriate mood and affect   --RESPIRATORY: Normal rhythm and effort. No abnormal accessory muscle breathing patterns noted.   --GROSS MOTOR: Easily arises from a seated position.  --LUMBAR SPINE: Inspection reveals no evidence of deformity. Range of motion is not limited in flexion, extension, lateral rotation. Mild tenderness to palpation of lumbar paraspinals, no tenderness in spinous processes.  Facet loading (Mustafa test) positive on left, seated SLR positive on left  --HIPS: Full range of motion bilaterally. Negative KEMI test.  --LOWER EXTREMITY MOTOR TESTING:  Hip flexion left 5/5, right 5/5  Knee extension left 5/5, right 5/5  Ankle dorsiflexors left 5/5, right 5/5  Ankle plantarflexors left 5/5, " right 5/5   Great toe extension left 5/5, right 5/5   Knee flexion left 5/5, right 5/5  --NEUROLOGIC: Sensation to lower extremities is intact bilaterally in L2-S1 dermatomes.  Negative Hunt's bilaterally. Reflexes intact in BLE, no clonus.  --VASCULAR: Warm upper limbs bilaterally. Capillary refill in the upper extremities is less than 1 second.    RESULTS: Available medical records from [facility] and any other outside records were reviewed today.     Imaging:  Available relevant imaging was personally reviewed and interpreted today. The images were shown to the patient and the findings were explained using a spine model.    MRI Lumbar Spine 2/6/24:  IMPRESSION: Multilevel lumbar spondylosis, as above. Notable findings  are as follows:  1.  Suspect a large sequestered disc fragment lying within the left  lateral and posterior epidural space at the level of the L3 superior  endplate/pedicle causing potentially clinically relevant impingement  of the traversing nerve roots, especially the left L3 nerve root.  2.  High-grade L5-S1 neural foraminal stenosis bilaterally potentially  causing clinically relevant L5 nerve root irritation/impingement.

## 2024-02-13 ENCOUNTER — MYC REFILL (OUTPATIENT)
Dept: FAMILY MEDICINE | Facility: CLINIC | Age: 64
End: 2024-02-13
Payer: COMMERCIAL

## 2024-02-13 DIAGNOSIS — M54.42 CHRONIC BILATERAL LOW BACK PAIN WITH LEFT-SIDED SCIATICA: ICD-10-CM

## 2024-02-13 DIAGNOSIS — G89.29 CHRONIC BILATERAL LOW BACK PAIN WITH LEFT-SIDED SCIATICA: ICD-10-CM

## 2024-02-14 DIAGNOSIS — M54.42 CHRONIC BILATERAL LOW BACK PAIN WITH LEFT-SIDED SCIATICA: ICD-10-CM

## 2024-02-14 DIAGNOSIS — G89.29 CHRONIC BILATERAL LOW BACK PAIN WITH LEFT-SIDED SCIATICA: ICD-10-CM

## 2024-02-14 RX ORDER — GABAPENTIN 100 MG/1
100 CAPSULE ORAL 3 TIMES DAILY PRN
Qty: 30 CAPSULE | Refills: 0 | Status: SHIPPED | OUTPATIENT
Start: 2024-02-14

## 2024-02-14 RX ORDER — CELECOXIB 200 MG/1
200 CAPSULE ORAL DAILY
Qty: 10 CAPSULE | Refills: 0 | Status: SHIPPED | OUTPATIENT
Start: 2024-02-14

## 2024-02-14 RX ORDER — CYCLOBENZAPRINE HCL 10 MG
10 TABLET ORAL 3 TIMES DAILY PRN
Qty: 30 TABLET | Refills: 0 | Status: SHIPPED | OUTPATIENT
Start: 2024-02-14

## 2024-02-23 ENCOUNTER — RADIOLOGY INJECTION OFFICE VISIT (OUTPATIENT)
Dept: PHYSICAL MEDICINE AND REHAB | Facility: CLINIC | Age: 64
End: 2024-02-23
Attending: STUDENT IN AN ORGANIZED HEALTH CARE EDUCATION/TRAINING PROGRAM
Payer: COMMERCIAL

## 2024-02-23 VITALS
RESPIRATION RATE: 16 BRPM | TEMPERATURE: 97.3 F | HEART RATE: 90 BPM | OXYGEN SATURATION: 95 % | DIASTOLIC BLOOD PRESSURE: 82 MMHG | SYSTOLIC BLOOD PRESSURE: 134 MMHG

## 2024-02-23 DIAGNOSIS — M54.16 LUMBAR RADICULOPATHY: ICD-10-CM

## 2024-02-23 PROCEDURE — 64483 NJX AA&/STRD TFRM EPI L/S 1: CPT | Mod: LT | Performed by: PAIN MEDICINE

## 2024-02-23 RX ORDER — DEXAMETHASONE SODIUM PHOSPHATE 10 MG/ML
INJECTION, SOLUTION INTRAMUSCULAR; INTRAVENOUS
Status: COMPLETED | OUTPATIENT
Start: 2024-02-23 | End: 2024-02-23

## 2024-02-23 RX ORDER — LIDOCAINE HYDROCHLORIDE 10 MG/ML
INJECTION, SOLUTION EPIDURAL; INFILTRATION; INTRACAUDAL; PERINEURAL
Status: COMPLETED | OUTPATIENT
Start: 2024-02-23 | End: 2024-02-23

## 2024-02-23 RX ADMIN — LIDOCAINE HYDROCHLORIDE 2 ML: 10 INJECTION, SOLUTION EPIDURAL; INFILTRATION; INTRACAUDAL; PERINEURAL at 15:23

## 2024-02-23 RX ADMIN — DEXAMETHASONE SODIUM PHOSPHATE 10 MG: 10 INJECTION, SOLUTION INTRAMUSCULAR; INTRAVENOUS at 15:24

## 2024-02-23 ASSESSMENT — PAIN SCALES - GENERAL
PAINLEVEL: NO PAIN (0)
PAINLEVEL: MILD PAIN (2)

## 2024-02-23 NOTE — PATIENT INSTRUCTIONS
Follow-up visit with Dr. Lopez in 2-4 weeks to discuss injection outcome and determine care plan going forward.       DISCHARGE INSTRUCTIONS    During office hours (8:00 a.m.- 4:00 p.m.) questions or concerns may be answered  by calling Spine Center Navigation Nurses at  [phone].  Messages received after hours will be returned the following business day.      In the case of an emergency, please dial 911 or seek assistance at the nearest Emergency Room/Urgent Care facility.     All Patients:    You may experience an increase in your symptoms for the first 2 days (It may take anywhere between 2 days- 2 weeks for the steroid to have maximum effect).    You may use ice on the injection site, as frequently as 20 minutes each hour if needed.    You may take your pain medicine.    You may continue taking your regular medication after your injection. If you have had a Medial Branch Block you may resume pain medication once your pain diary is completed.    You may shower. No swimming, tub bath or hot tub for 48 hours.  You may remove your bandaid/bandage as soon as you are home.    You may resume light activities, as tolerated.    Resume your usual diet as tolerated.    It is strongly advised that you do not drive for 1-3 hours post injection.    If you have had oral sedation:  Do not drive for 8 hours post injection.      If you have had IV sedation:  Do not drive for 24 hours post injection.  Do not operate hazardous machinery or make important personal/business decisions for 24 hours.      POSSIBLE STEROID SIDE EFFECTS (If steroid/cortisone was used for your procedure)    -If you experience these symptoms, it should only last for a short period    Swelling of the legs              Skin redness (flushing)     Mouth (oral) irritation   Blood sugar (glucose) levels            Sweats                    Mood changes  Headache  Sleeplessness  Weakened immune system for up to 14 days, which could increase the risk of  flaco the COVID-19 virus and/or experiencing more severe symptoms of the disease, if exposed.  Decreased effectiveness of the flu vaccine if given within 2 weeks of the steroid.         POSSIBLE PROCEDURE SIDE EFFECTS  -Call the Spine Center if you are concerned  Increased Pain           Increased numbness/tingling      Nausea/Vomiting          Bruising/bleeding at site      Redness or swelling                                              Difficulty walking      Weakness           Fever greater than 100.5    *In the event of a severe headache after an epidural steroid injection that is relieved by lying down, please call the [facility] to speak with a clinical staff member*

## 2024-02-23 NOTE — LETTER
[facility] SPINE AND NEUROS[facility]  [address]  [place] [facility] [place] [facility] [address]  Phone: [phone]  Fax: [phone]    February 23, 2024        Melo Brewer  [address] [place] [id number]          To whom it may concern:    RE: Melo Brewer    Please excuse Melo from work on 2/23/24 on account of a medical appointment he had at the [facility].    Please contact me for questions or concerns.      Sincerely,               Dr. Crocker

## 2024-03-08 ENCOUNTER — OFFICE VISIT (OUTPATIENT)
Dept: PHYSICAL MEDICINE AND REHAB | Facility: CLINIC | Age: 64
End: 2024-03-08
Payer: COMMERCIAL

## 2024-03-08 VITALS
WEIGHT: 165 LBS | SYSTOLIC BLOOD PRESSURE: 138 MMHG | OXYGEN SATURATION: 98 % | DIASTOLIC BLOOD PRESSURE: 80 MMHG | HEART RATE: 93 BPM | BODY MASS INDEX: 25.9 KG/M2 | HEIGHT: 67 IN

## 2024-03-08 DIAGNOSIS — M54.16 LUMBAR RADICULOPATHY: Primary | ICD-10-CM

## 2024-03-08 DIAGNOSIS — M51.369 LUMBAR DEGENERATIVE DISC DISEASE: ICD-10-CM

## 2024-03-08 PROCEDURE — 99213 OFFICE O/P EST LOW 20 MIN: CPT | Performed by: STUDENT IN AN ORGANIZED HEALTH CARE EDUCATION/TRAINING PROGRAM

## 2024-03-08 ASSESSMENT — PAIN SCALES - GENERAL: PAINLEVEL: NO PAIN (1)

## 2024-03-08 NOTE — LETTER
3/8/2024         RE: Melo Brewer  [address]  [place] [facility] [address]        Dear Colleague,    Thank you for referring your patient, Melo Brewer, to the [facility]INE [facility]UR[facility]ERY. Please see a copy of my visit note below.    ASSESSMENT:  Melo Brewer is a 63 year old male presents for follow-up evaluation of :         Diagnoses and all orders for this visit:  Lumbar radiculopathy  Lumbar degenerative disc disease         Patient is neurologically intact on exam. No myelopathic or red flag symptoms.    Patient presents for follow-up of his low back pain and left-sided lumbar radiculopathy, following a left L4-L5 TFESI performed by my colleague Dr. Crocker.  Patient reports 90% improvement of pain with the injection.  He is very eager to return to work now that the pain is much better controlled.  We discussed that typically, our recommended progression is to follow the injection with physical therapy and possibly work hardening before returning to work to reduce the chance of recurrence.  Patient was advised that without the steps, there is an increased risk of recurrence.  Patient expressed understanding of this, but he declines therapy and will do exercises on his own at home.  He wishes to return to work with an understanding of what risks that entails, so a letter clearing for return to work was provided today.  Patient strongly advised to follow-up with us again if symptoms recur, and we may need to repeat this process with therapy before he can return in the future.    PLAN:  Reviewed spine anatomy and disease process. Discussed diagnosis and treatment options with the patient today. A shared decision making model was used. The patient's values and choices were respected. The following represents what was discussed and decided upon by the provider and the patient.    1. DIAGNOSTIC TESTS  No new imaging orders at this time.    2. PHYSICAL THERAPY  Patient was advised that it may  be in his best interest to do physical therapy prior to returning to work.  Patient acknowledges this and wishes to do home exercises on his own instead.  Discussed the importance of core strengthening, ROM, stretching exercises with the patient and how each of these entities is important in decreasing pain.  Explained to the patient that the purpose of physical therapy is to teach the patient a home exercise program.  These exercises need to be performed every day in order to decrease pain and prevent future occurrences of pain.  Likened it to brushing one's teeth.      3. MEDICATIONS:  Discussed multiple medication options today with patient. Discussed risks, side effects, and proper use of medications. Patient verbalized understanding.  No new medications ordered at this visit.    4. INTERVENTIONS:  Interventional treatments are not indicated for patient's presentation.  Patient doing very well after the last injection    5. OTHER REFERRALS:  No other referrals at this time.    6. FOLLOW-UP  As needed.  Patient advised to contact our office for earlier appointment if symptoms worsening or not improving, or any side effects are noticed.      Advised patient to call the Spine Center if symptoms worsen or you have problems controlling bladder and bowel function.   ______________________________________________________________________    SUBJECTIVE:   Melo Brewer  is a 63 year old male who presents today for follow-up evaluation.    Patient reports 90% relief of the back and left leg pain following left L4 and L5 TFESI performed on 2/23/2024 by my colleague Dr. Crocker.  Patient reports no new symptoms since the injection, and is very happy with the results.  He immediately expressed a desire to return to work as soon as Monday.  We discussed at length that my recommendation would be to follow this up with physical therapy and work conditioning since without this, there would be an increased chance of  recurrence.  Patient expressed understanding of this and of the attendant risks of not strengthening and conditioning his spine before return to work, but he feels he is ready and able to manage on his own with home exercises. No maggi numbness, weakness, or bladder/bowel incontinence. No prior injections or surgeries.    -Treatment to Date: Medication management, activity modification    2/23/24 - Obi - Left L4-L5 TFESI - 90% relief of low back and leg pain      Oswestry (GALINA) Questionnaire         No data to display                Neck Disability Index:       No data to display                       -Medications:    Current Outpatient Medications   Medication     acetaminophen (TYLENOL) 650 MG CR tablet     atorvastatin (LIPITOR) 80 MG tablet     celecoxib (CELEBREX) 200 MG capsule     celecoxib (CELEBREX) 200 MG capsule     cyclobenzaprine (FLEXERIL) 10 MG tablet     cyclobenzaprine (FLEXERIL) 10 MG tablet     diclofenac (VOLTAREN) 1 % topical gel     escitalopram (LEXAPRO) 10 MG tablet     gabapentin (NEURONTIN) 100 MG capsule     gabapentin (NEURONTIN) 100 MG capsule     ibuprofen (ADVIL/MOTRIN) 200 MG tablet     lisinopril (ZESTRIL) 10 MG tablet     metoprolol succinate ER (TOPROL XL) 25 MG 24 hr tablet     oxyCODONE (ROXICODONE) 5 MG tablet     SM ASPIRIN LOW DOSE 81 MG EC tablet     No current facility-administered medications for this visit.       Allergies   Allergen Reactions     Apple Juice Anaphylaxis     Bees Anaphylaxis       No past medical history on file.     Patient Active Problem List   Diagnosis     CARDIOVASCULAR SCREENING; LDL GOAL LESS THAN 130     Carotid artery stenosis       Past Surgical History:   Procedure Laterality Date     BACK SURGERY       COLONOSCOPY N/A 5/19/2017    Procedure: COLONOSCOPY;  Colonoscopy;  Surgeon: Robert Elmore MD;  Location: [facility]     ENDARTERECTOMY CAROTID Right 2/26/2019    Procedure: RIGHT CAROTID ENDARTERECTOMY WITH EEG AND INTRAOPERATIVE  "ULTRASOUND;  Surgeon: Sae Ramos MD;  Location: [facility]     HC EXCIS PRIMARY GANGLION WRIST  12/23/2004    Left wrist     HC INJ EPIDURAL LUMBAR/SACRAL W/WO CONTRAST  2009     HC REMOVAL OF TONSILS,<14 Y/O  1967    Tonsils <13y.o.     SURGICAL HISTORY OF -   4/24/2009    Left C6-C7 Microdiskectomy.       Family History   Problem Relation Age of Onset     Hypertension Father         on medication HTN     Hyperlipidemia Father      Hypertension Brother        Reviewed past medical, surgical, and family history with patient found on new patient intake packet located in EMR Media tab.     SOCIAL HX: Reviewed    ROS: Specifically negative for bowel/bladder dysfunction, balance changes, headache, dizziness, foot drop, fevers, chills, appetite changes, nausea/vomiting, unexplained weight loss. Otherwise 13 systems reviewed are negative. Please see the patient's intake questionnaire from today for details.    OBJECTIVE:  /80 (BP Location: Right arm, Patient Position: Sitting, Cuff Size: Adult Regular)   Pulse 93   Ht 5' 6.75\" (1.695 m)   Wt 165 lb (74.8 kg)   SpO2 98%   BMI 26.04 kg/m      PHYSICAL EXAMINATION:  --CONSTITUTIONAL: Vital signs as above. No acute distress. The patient is well nourished and well groomed.  --PSYCHIATRIC: The patient is awake, alert, oriented to person, place, time and answering questions appropriately with clear speech. Appropriate mood and affect   --RESPIRATORY: Normal rhythm and effort. No abnormal accessory muscle breathing patterns noted.   --GROSS MOTOR: Easily arises from a seated position.  --LUMBAR SPINE: Inspection reveals no evidence of deformity. Range of motion is not limited in flexion, extension, lateral rotation. Mild tenderness to palpation of lumbar paraspinals, no tenderness in spinous processes.  Facet loading (Mustafa test) negative on left, seated SLR negative on left  --HIPS: Full range of motion bilaterally. Negative KEMI test.  --LOWER EXTREMITY MOTOR " TESTING:  Hip flexion left 5/5, right 5/5  Knee extension left 5/5, right 5/5  Ankle dorsiflexors left 5/5, right 5/5  Ankle plantarflexors left 5/5, right 5/5   Great toe extension left 5/5, right 5/5   Knee flexion left 5/5, right 5/5  --NEUROLOGIC: Sensation to lower extremities is intact bilaterally in L2-S1 dermatomes.  Negative Hunt's bilaterally. Reflexes intact in BLE, no clonus.  --VASCULAR: Warm upper limbs bilaterally. Capillary refill in the upper extremities is less than 1 second.    RESULTS: Available medical records from [facility] and any other outside records were reviewed today.     Imaging:  Available relevant imaging was personally reviewed and interpreted today. The images were shown to the patient and the findings were explained using a spine model.    MRI Lumbar Spine 2/6/24:  IMPRESSION: Multilevel lumbar spondylosis, as above. Notable findings  are as follows:  1.  Suspect a large sequestered disc fragment lying within the left  lateral and posterior epidural space at the level of the L3 superior  endplate/pedicle causing potentially clinically relevant impingement  of the traversing nerve roots, especially the left L3 nerve root.  2.  High-grade L5-S1 neural foraminal stenosis bilaterally potentially  causing clinically relevant L5 nerve root irritation/impingement.       Again, thank you for allowing me to participate in the care of your patient.        Sincerely,        Andrew Lopez MD

## 2024-03-08 NOTE — PATIENT INSTRUCTIONS
~Spine Center Scheduling #[phone].  ~Please call our [facility] Spine Nurse Navigation #[phone] with any questions or concerns about your treatment plan, if symptoms worsen and you would like to be seen urgently, or if you have problems controlling bladder and bowel function.  ~For any future flareups or new symptoms, recommend follow-up in clinic or contact the nurse navigator line.  ~Please note that any My Chart messages may take multiple days for a response due to the high volume of patients seen in clinic.  Anything sent Friday night or after will be answered the following week when able.

## 2024-03-08 NOTE — PROGRESS NOTES
ASSESSMENT:  Melo Brewer is a 63 year old male presents for follow-up evaluation of :         Diagnoses and all orders for this visit:  Lumbar radiculopathy  Lumbar degenerative disc disease         Patient is neurologically intact on exam. No myelopathic or red flag symptoms.    Patient presents for follow-up of his low back pain and left-sided lumbar radiculopathy, following a left L4-L5 TFESI performed by my colleague Dr. Crocker.  Patient reports 90% improvement of pain with the injection.  He is very eager to return to work now that the pain is much better controlled.  We discussed that typically, our recommended progression is to follow the injection with physical therapy and possibly work hardening before returning to work to reduce the chance of recurrence.  Patient was advised that without the steps, there is an increased risk of recurrence.  Patient expressed understanding of this, but he declines therapy and will do exercises on his own at home.  He wishes to return to work with an understanding of what risks that entails, so a letter clearing for return to work was provided today.  Patient strongly advised to follow-up with us again if symptoms recur, and we may need to repeat this process with therapy before he can return in the future.    PLAN:  Reviewed spine anatomy and disease process. Discussed diagnosis and treatment options with the patient today. A shared decision making model was used. The patient's values and choices were respected. The following represents what was discussed and decided upon by the provider and the patient.    1. DIAGNOSTIC TESTS  No new imaging orders at this time.    2. PHYSICAL THERAPY  Patient was advised that it may be in his best interest to do physical therapy prior to returning to work.  Patient acknowledges this and wishes to do home exercises on his own instead.  Discussed the importance of core strengthening, ROM, stretching exercises with the patient and how  each of these entities is important in decreasing pain.  Explained to the patient that the purpose of physical therapy is to teach the patient a home exercise program.  These exercises need to be performed every day in order to decrease pain and prevent future occurrences of pain.  Likened it to brushing one's teeth.      3. MEDICATIONS:  Discussed multiple medication options today with patient. Discussed risks, side effects, and proper use of medications. Patient verbalized understanding.  No new medications ordered at this visit.    4. INTERVENTIONS:  Interventional treatments are not indicated for patient's presentation.  Patient doing very well after the last injection    5. OTHER REFERRALS:  No other referrals at this time.    6. FOLLOW-UP  As needed.  Patient advised to contact our office for earlier appointment if symptoms worsening or not improving, or any side effects are noticed.      Advised patient to call the [facility] if symptoms worsen or you have problems controlling bladder and bowel function.   ______________________________________________________________________    SUBJECTIVE:   Melo Brewer  is a 63 year old male who presents today for follow-up evaluation.    Patient reports 90% relief of the back and left leg pain following left L4 and L5 TFESI performed on 2/23/2024 by my colleague Dr. Crocker.  Patient reports no new symptoms since the injection, and is very happy with the results.  He immediately expressed a desire to return to work as soon as Monday.  We discussed at length that my recommendation would be to follow this up with physical therapy and work conditioning since without this, there would be an increased chance of recurrence.  Patient expressed understanding of this and of the attendant risks of not strengthening and conditioning his spine before return to work, but he feels he is ready and able to manage on his own with home exercises. No maggi numbness, weakness, or  bladder/bowel incontinence. No prior injections or surgeries.    -Treatment to Date: Medication management, activity modification    2/23/24 - Obi - Left L4-L5 TFESI - 90% relief of low back and leg pain      Oswestry (GALINA) Questionnaire         No data to display                Neck Disability Index:       No data to display                       -Medications:    Current Outpatient Medications   Medication    acetaminophen (TYLENOL) 650 MG CR tablet    atorvastatin (LIPITOR) 80 MG tablet    celecoxib (CELEBREX) 200 MG capsule    celecoxib (CELEBREX) 200 MG capsule    cyclobenzaprine (FLEXERIL) 10 MG tablet    cyclobenzaprine (FLEXERIL) 10 MG tablet    diclofenac (VOLTAREN) 1 % topical gel    escitalopram (LEXAPRO) 10 MG tablet    gabapentin (NEURONTIN) 100 MG capsule    gabapentin (NEURONTIN) 100 MG capsule    ibuprofen (ADVIL/MOTRIN) 200 MG tablet    lisinopril (ZESTRIL) 10 MG tablet    metoprolol succinate ER (TOPROL XL) 25 MG 24 hr tablet    oxyCODONE (ROXICODONE) 5 MG tablet    SM ASPIRIN LOW DOSE 81 MG EC tablet     No current facility-administered medications for this visit.       Allergies   Allergen Reactions    Apple Juice Anaphylaxis    Bees Anaphylaxis       No past medical history on file.     Patient Active Problem List   Diagnosis    CARDIOVASCULAR SCREENING; LDL GOAL LESS THAN 130    Carotid artery stenosis       Past Surgical History:   Procedure Laterality Date    BACK SURGERY      COLONOSCOPY N/A 5/19/2017    Procedure: COLONOSCOPY;  Colonoscopy;  Surgeon: Robert Elmore MD;  Location: [facility]    ENDARTERECTOMY CAROTID Right 2/26/2019    Procedure: RIGHT CAROTID ENDARTERECTOMY WITH EEG AND INTRAOPERATIVE ULTRASOUND;  Surgeon: Sae Ramos MD;  Location: [facility]    HC EXCIS PRIMARY GANGLION WRIST  12/23/2004    Left wrist    HC INJ EPIDURAL LUMBAR/SACRAL W/WO CONTRAST  2009    HC REMOVAL OF TONSILS,<13 Y/O  1967    Tonsils <13y.o.    SURGICAL HISTORY OF -   4/24/2009    Left C6-C7  "Microdiskectomy.       Family History   Problem Relation Age of Onset    Hypertension Father         on medication HTN    Hyperlipidemia Father     Hypertension Brother        Reviewed past medical, surgical, and family history with patient found on new patient intake packet located in EMR Media tab.     SOCIAL HX: Reviewed    ROS: Specifically negative for bowel/bladder dysfunction, balance changes, headache, dizziness, foot drop, fevers, chills, appetite changes, nausea/vomiting, unexplained weight loss. Otherwise 13 systems reviewed are negative. Please see the patient's intake questionnaire from today for details.    OBJECTIVE:  /80 (BP Location: Right arm, Patient Position: Sitting, Cuff Size: Adult Regular)   Pulse 93   Ht 5' 6.75\" (1.695 m)   Wt 165 lb (74.8 kg)   SpO2 98%   BMI 26.04 kg/m      PHYSICAL EXAMINATION:  --CONSTITUTIONAL: Vital signs as above. No acute distress. The patient is well nourished and well groomed.  --PSYCHIATRIC: The patient is awake, alert, oriented to person, place, time and answering questions appropriately with clear speech. Appropriate mood and affect   --RESPIRATORY: Normal rhythm and effort. No abnormal accessory muscle breathing patterns noted.   --GROSS MOTOR: Easily arises from a seated position.  --LUMBAR SPINE: Inspection reveals no evidence of deformity. Range of motion is not limited in flexion, extension, lateral rotation. Mild tenderness to palpation of lumbar paraspinals, no tenderness in spinous processes.  Facet loading (Mustafa test) negative on left, seated SLR negative on left  --HIPS: Full range of motion bilaterally. Negative KEMI test.  --LOWER EXTREMITY MOTOR TESTING:  Hip flexion left 5/5, right 5/5  Knee extension left 5/5, right 5/5  Ankle dorsiflexors left 5/5, right 5/5  Ankle plantarflexors left 5/5, right 5/5   Great toe extension left 5/5, right 5/5   Knee flexion left 5/5, right 5/5  --NEUROLOGIC: Sensation to lower extremities is intact " bilaterally in L2-S1 dermatomes.  Negative Hunt's bilaterally. Reflexes intact in BLE, no clonus.  --VASCULAR: Warm upper limbs bilaterally. Capillary refill in the upper extremities is less than 1 second.    RESULTS: Available medical records from [facility] and any other outside records were reviewed today.     Imaging:  Available relevant imaging was personally reviewed and interpreted today. The images were shown to the patient and the findings were explained using a spine model.    MRI Lumbar Spine 2/6/24:  IMPRESSION: Multilevel lumbar spondylosis, as above. Notable findings  are as follows:  1.  Suspect a large sequestered disc fragment lying within the left  lateral and posterior epidural space at the level of the L3 superior  endplate/pedicle causing potentially clinically relevant impingement  of the traversing nerve roots, especially the left L3 nerve root.  2.  High-grade L5-S1 neural foraminal stenosis bilaterally potentially  causing clinically relevant L5 nerve root irritation/impingement.

## 2024-03-08 NOTE — LETTER
March 8, 2024      Melo Brewer  [address] [facility] [address]        To Whom It May Concern:    Melo Brewer was seen in our clinic. He may return to work without restrictions. Patient has been advised if he has recurrence of symptoms he is to follow up with us.      Sincerely,        Andrew Lopez MD

## 2024-04-22 DIAGNOSIS — E78.5 HYPERLIPIDEMIA LDL GOAL <100: ICD-10-CM

## 2024-04-22 RX ORDER — ATORVASTATIN CALCIUM 80 MG/1
TABLET, FILM COATED ORAL
Qty: 90 TABLET | Refills: 0 | Status: SHIPPED | OUTPATIENT
Start: 2024-04-22 | End: 2024-07-10

## 2024-05-16 DIAGNOSIS — I65.21 STENOSIS OF RIGHT CAROTID ARTERY: ICD-10-CM

## 2024-05-16 DIAGNOSIS — I10 BENIGN ESSENTIAL HYPERTENSION: ICD-10-CM

## 2024-05-16 RX ORDER — METOPROLOL SUCCINATE 25 MG/1
TABLET, EXTENDED RELEASE ORAL
Qty: 90 TABLET | Refills: 1 | Status: SHIPPED | OUTPATIENT
Start: 2024-05-16

## 2024-05-16 RX ORDER — ASPIRIN 81 MG/1
81 TABLET, COATED ORAL DAILY
Qty: 90 TABLET | Refills: 3 | Status: SHIPPED | OUTPATIENT
Start: 2024-05-16

## 2024-05-25 ENCOUNTER — HEALTH MAINTENANCE LETTER (OUTPATIENT)
Age: 64
End: 2024-05-25

## 2024-06-17 DIAGNOSIS — F33.1 MAJOR DEPRESSIVE DISORDER, RECURRENT EPISODE, MODERATE (H): ICD-10-CM

## 2024-06-17 RX ORDER — ESCITALOPRAM OXALATE 10 MG/1
10 TABLET ORAL DAILY
Qty: 30 TABLET | Refills: 3 | Status: SHIPPED | OUTPATIENT
Start: 2024-06-17 | End: 2024-10-07

## 2024-07-09 DIAGNOSIS — E78.5 HYPERLIPIDEMIA LDL GOAL <100: ICD-10-CM

## 2024-07-10 RX ORDER — ATORVASTATIN CALCIUM 80 MG/1
TABLET, FILM COATED ORAL
Qty: 90 TABLET | Refills: 0 | Status: SHIPPED | OUTPATIENT
Start: 2024-07-10 | End: 2024-10-07

## 2024-09-07 DIAGNOSIS — I10 BENIGN ESSENTIAL HYPERTENSION: ICD-10-CM

## 2024-09-09 RX ORDER — LISINOPRIL 10 MG/1
TABLET ORAL
Qty: 90 TABLET | Refills: 2 | Status: SHIPPED | OUTPATIENT
Start: 2024-09-09

## 2024-10-07 DIAGNOSIS — F33.1 MAJOR DEPRESSIVE DISORDER, RECURRENT EPISODE, MODERATE (H): ICD-10-CM

## 2024-10-07 DIAGNOSIS — E78.5 HYPERLIPIDEMIA LDL GOAL <100: ICD-10-CM

## 2024-10-07 RX ORDER — ESCITALOPRAM OXALATE 10 MG/1
10 TABLET ORAL DAILY
Qty: 30 TABLET | Refills: 3 | Status: SHIPPED | OUTPATIENT
Start: 2024-10-07

## 2024-10-07 RX ORDER — ATORVASTATIN CALCIUM 80 MG/1
TABLET, FILM COATED ORAL
Qty: 90 TABLET | Refills: 0 | Status: SHIPPED | OUTPATIENT
Start: 2024-10-07

## 2024-11-11 DIAGNOSIS — I10 BENIGN ESSENTIAL HYPERTENSION: ICD-10-CM

## 2024-11-11 RX ORDER — METOPROLOL SUCCINATE 25 MG/1
TABLET, EXTENDED RELEASE ORAL
Qty: 90 TABLET | Refills: 1 | Status: SHIPPED | OUTPATIENT
Start: 2024-11-11

## 2024-12-25 ENCOUNTER — HOSPITAL ENCOUNTER (EMERGENCY)
Facility: CLINIC | Age: 64
Discharge: HOME | End: 2024-12-25
Attending: EMERGENCY MEDICINE
Payer: COMMERCIAL

## 2024-12-25 ENCOUNTER — APPOINTMENT (OUTPATIENT)
Dept: GENERAL RADIOLOGY | Facility: CLINIC | Age: 64
End: 2024-12-25
Attending: EMERGENCY MEDICINE
Payer: COMMERCIAL

## 2024-12-25 VITALS
DIASTOLIC BLOOD PRESSURE: 91 MMHG | SYSTOLIC BLOOD PRESSURE: 158 MMHG | TEMPERATURE: 98 F | HEART RATE: 67 BPM | OXYGEN SATURATION: 97 % | RESPIRATION RATE: 18 BRPM

## 2024-12-25 DIAGNOSIS — M25.561 ACUTE PAIN OF RIGHT KNEE: ICD-10-CM

## 2024-12-25 DIAGNOSIS — M25.461 EFFUSION OF RIGHT KNEE: ICD-10-CM

## 2024-12-25 PROCEDURE — 73562 X-RAY EXAM OF KNEE 3: CPT | Mod: RT

## 2024-12-25 PROCEDURE — 99283 EMERGENCY DEPT VISIT LOW MDM: CPT | Performed by: EMERGENCY MEDICINE

## 2024-12-25 PROCEDURE — 99284 EMERGENCY DEPT VISIT MOD MDM: CPT | Mod: 25 | Performed by: EMERGENCY MEDICINE

## 2024-12-25 RX ORDER — HYDROCODONE BITARTRATE AND ACETAMINOPHEN 5; 325 MG/1; MG/1
1 TABLET ORAL EVERY 6 HOURS PRN
Qty: 6 TABLET | Refills: 0 | Status: SHIPPED | OUTPATIENT
Start: 2024-12-25

## 2024-12-25 ASSESSMENT — ACTIVITIES OF DAILY LIVING (ADL)
ADLS_ACUITY_SCORE: 41
ADLS_ACUITY_SCORE: 41

## 2024-12-25 ASSESSMENT — COLUMBIA-SUICIDE SEVERITY RATING SCALE - C-SSRS
6. HAVE YOU EVER DONE ANYTHING, STARTED TO DO ANYTHING, OR PREPARED TO DO ANYTHING TO END YOUR LIFE?: NO
2. HAVE YOU ACTUALLY HAD ANY THOUGHTS OF KILLING YOURSELF IN THE PAST MONTH?: NO
1. IN THE PAST MONTH, HAVE YOU WISHED YOU WERE DEAD OR WISHED YOU COULD GO TO SLEEP AND NOT WAKE UP?: NO

## 2024-12-25 NOTE — ED TRIAGE NOTES
"Pt presents with right knee pain after he heard something pop when he got in his truck today. Pt reports it has been aching for a while, then it \"popped\" today. Pain with weight bearing        "

## 2024-12-25 NOTE — LETTER
December 25, 2024      To Whom It May Concern:      Melo DE SANTIAGO Jooricardo was seen in our Emergency Department today, 12/25/24.  Please excuse him from work tomorrow, 12/26/2024.  If he is unable to get this day off, please allow him to have a seated position while working.    Sincerely,        Bita Lo MD

## 2024-12-26 NOTE — ED PROVIDER NOTES
"  History     Chief Complaint   Patient presents with    Knee Pain     HPI  History per patient, medical records    This is a 64-year-old male, history as below, presenting with right knee pain.  Patient states he has had a sore knee for about 2 to 3 weeks.  He has been wearing a sleeve brace while he has been at work.  Today he was stepping up into his truck when he felt a \"pop\" and pain in the right knee.  He was able to drive but when he got out and tried to bear weight he had increased pain.  He denies any other trauma.  No history of issues with the knee in the past.  He has never had any injections or surgeries.  No numbness or tingling.  No hip, ankle, foot pain.  He took a couple of ibuprofen prior to coming to the ED.    Allergies:  Allergies   Allergen Reactions    Apple Juice Anaphylaxis    Bees Anaphylaxis       Problem List:    Patient Active Problem List    Diagnosis Date Noted    Carotid artery stenosis 02/26/2019     Priority: Medium    CARDIOVASCULAR SCREENING; LDL GOAL LESS THAN 130 10/31/2010     Priority: Medium        Past Medical History:    No past medical history on file.    Past Surgical History:    Past Surgical History:   Procedure Laterality Date    BACK SURGERY      COLONOSCOPY N/A 5/19/2017    Procedure: COLONOSCOPY;  Colonoscopy;  Surgeon: Robert Elmore MD;  Location: [facility]    ENDARTERECTOMY CAROTID Right 2/26/2019    Procedure: RIGHT CAROTID ENDARTERECTOMY WITH EEG AND INTRAOPERATIVE ULTRASOUND;  Surgeon: Sae Ramos MD;  Location: [facility]    HC EXCIS PRIMARY GANGLION WRIST  12/23/2004    Left wrist    HC INJ EPIDURAL LUMBAR/SACRAL W/WO CONTRAST  2009    HC REMOVAL OF TONSILS,<14 Y/O  1967    Tonsils <13y.o.    SURGICAL HISTORY OF -   4/24/2009    Left C6-C7 Microdiskectomy.       Family History:    Family History   Problem Relation Age of Onset    Hypertension Father         on medication HTN    Hyperlipidemia Father     Hypertension Brother        Social " History:  Marital Status:   [2]  Social History     Tobacco Use    Smoking status: Former     Current packs/day: 0.50     Average packs/day: 0.5 packs/day for 15.0 years (7.5 ttl pk-yrs)     Types: Cigarettes    Smokeless tobacco: Never   Substance Use Topics    Alcohol use: Yes     Comment: 1-2 daily    Drug use: No        Medications:    HYDROcodone-acetaminophen (NORCO) 5-325 MG tablet  acetaminophen (TYLENOL) 650 MG CR tablet  ASPIRIN LOW DOSE 81 MG EC tablet  atorvastatin (LIPITOR) 80 MG tablet  celecoxib (CELEBREX) 200 MG capsule  celecoxib (CELEBREX) 200 MG capsule  cyclobenzaprine (FLEXERIL) 10 MG tablet  cyclobenzaprine (FLEXERIL) 10 MG tablet  diclofenac (VOLTAREN) 1 % topical gel  escitalopram (LEXAPRO) 10 MG tablet  gabapentin (NEURONTIN) 100 MG capsule  gabapentin (NEURONTIN) 100 MG capsule  ibuprofen (ADVIL/MOTRIN) 200 MG tablet  lisinopril (ZESTRIL) 10 MG tablet  metoprolol succinate ER (TOPROL XL) 25 MG 24 hr tablet  oxyCODONE (ROXICODONE) 5 MG tablet          Review of Systems  All other ROS reviewed and are negative or non-contributory except as stated in HPI.     Physical Exam   BP: (!) 158/91  Pulse: 67  Temp: 98  F (36.7  C)  Resp: 18  SpO2: 97 %      Physical Exam  Vitals and nursing note reviewed.   Constitutional:       Appearance: Normal appearance.      Comments: Pleasant, healthy-appearing gentleman sitting in the bed   HENT:      Head: Normocephalic.   Eyes:      Extraocular Movements: Extraocular movements intact.   Cardiovascular:      Rate and Rhythm: Normal rate and regular rhythm.      Pulses: Normal pulses.   Pulmonary:      Effort: Pulmonary effort is normal.   Musculoskeletal:         General: Normal range of motion.      Cervical back: Normal range of motion.      Comments: Right knee with small amount of swelling/effusion.  There is no erythema, no warmth, no bruising.  No tenderness over the patella.  It no obvious Baker's cyst.  Small amount of tenderness over the joint  line medially and laterally.  No obvious laxity.  Negative anterior/posterior drawer.  Patient can bend to greater than 90 degrees actively and has full extension.  Some increased discomfort with extension.  Greatest discomfort with weightbearing.  Normal CMS at the ankle and toes.  No hip pain.   Skin:     General: Skin is warm and dry.      Findings: No bruising, erythema or rash.   Neurological:      General: No focal deficit present.      Mental Status: He is alert and oriented to person, place, and time.   Psychiatric:         Mood and Affect: Mood normal.         Behavior: Behavior normal.         ED Course (with Medical Decision Making)    Pt seen and examined by me.  RN and EPIC notes reviewed.       Patient with right knee pain.  No significant injury noted.  No evidence for infection with no redness and warmth.  Possible mild effusion on exam.  No obvious laxity.  He may have a ligamentous injury, meniscal injury.  I doubt fracture.    X-ray was done.  Effusion noted on x-ray, joint space maintained.    Results discussed with the patient.  Recommend rest, ice, elevation.  He was placed in a knee immobilizer.  He declines any crutches.  He was given a work note.  I sent a referral for orthopedic follow-up.  He may need cortisone injection or MRI.  He can weight-bear as tolerated.  Try over-the-counter anti-inflammatory medications.  Could also try topical Voltaren.  He was given an Rx for small amount of Norco to use if needed for severe pain.  Return for significant worsening, changes or concerns.        Procedures  Results for orders placed or performed during the hospital encounter of 12/25/24 (from the past 24 hours)   XR Knee Right 3 Views    Narrative    EXAM: XR KNEE RIGHT 3 VIEWS  LOCATION: [facility]  DATE: 12/25/2024    INDICATION: Pain.  COMPARISON: None.      Impression    IMPRESSION:  Large knee joint effusion. Joint spaces are maintained. No evidence of an acute  fracture.       Medications - No data to display    Assessments & Plan      I have reviewed the findings, diagnosis, plan and need for follow up with the patient.  Discharge Medication List as of 12/25/2024  6:04 PM        START taking these medications    Details   HYDROcodone-acetaminophen (NORCO) 5-325 MG tablet Take 1 tablet by mouth every 6 hours as needed for severe pain., Disp-6 tablet, R-0, [facility]             Final diagnoses:   Effusion of right knee   Acute pain of right knee     Disposition: Patient discharged home in stable condition.  Plan as above.  Return for concerns.     Note: Chart documentation done in part with Dragon Voice Recognition software. Although reviewed after completion, some word and grammatical errors may remain.   12/25/2024   [facility] EMERGENCY DEPT       Bita Lo MD  12/25/24 2002

## 2024-12-26 NOTE — DISCHARGE INSTRUCTIONS
Rest, ice, elevate leg to decrease swelling and pain.    Continue ibuprofen or Aleve for inflammation and pain.    Okay to add Tylenol as needed.    Norco if needed for severe pain.  No driving while on this medication.  It can cause constipation so take stool softeners if needed.    Referral sent for orthopedic follow-up.    Use the knee immobilizer for stabilization and comfort.    If you have any significant worsening, changes or concerns return at any time.    I hope that you heal quickly and have a wonderful new year!!

## 2024-12-27 ENCOUNTER — OFFICE VISIT (OUTPATIENT)
Dept: ORTHOPEDICS | Facility: CLINIC | Age: 64
End: 2024-12-27
Attending: EMERGENCY MEDICINE
Payer: COMMERCIAL

## 2024-12-27 VITALS
DIASTOLIC BLOOD PRESSURE: 71 MMHG | TEMPERATURE: 97.5 F | HEART RATE: 78 BPM | SYSTOLIC BLOOD PRESSURE: 119 MMHG | BODY MASS INDEX: 27 KG/M2 | WEIGHT: 172 LBS | HEIGHT: 67 IN

## 2024-12-27 DIAGNOSIS — M25.561 ACUTE PAIN OF RIGHT KNEE: ICD-10-CM

## 2024-12-27 DIAGNOSIS — M25.461 EFFUSION OF RIGHT KNEE: ICD-10-CM

## 2024-12-27 LAB — CRYSTALS SNV MICRO: NORMAL

## 2024-12-27 PROCEDURE — 20610 DRAIN/INJ JOINT/BURSA W/O US: CPT | Mod: RT | Performed by: ORTHOPAEDIC SURGERY

## 2024-12-27 PROCEDURE — 89060 EXAM SYNOVIAL FLUID CRYSTALS: CPT | Performed by: ORTHOPAEDIC SURGERY

## 2024-12-27 PROCEDURE — 99203 OFFICE O/P NEW LOW 30 MIN: CPT | Mod: 25 | Performed by: ORTHOPAEDIC SURGERY

## 2024-12-27 RX ADMIN — TRIAMCINOLONE ACETONIDE 40 MG: 40 INJECTION, SUSPENSION INTRA-ARTICULAR; INTRAMUSCULAR at 11:18

## 2024-12-27 RX ADMIN — LIDOCAINE HYDROCHLORIDE 3 ML: 10 INJECTION, SOLUTION INFILTRATION; PERINEURAL at 11:18

## 2024-12-27 RX ADMIN — BUPIVACAINE HYDROCHLORIDE 3 ML: 5 INJECTION, SOLUTION PERINEURAL at 11:18

## 2024-12-27 ASSESSMENT — PAIN SCALES - GENERAL: PAINLEVEL_OUTOF10: MODERATE PAIN (5)

## 2024-12-27 NOTE — LETTER
"12/27/2024      Melo Brewer  [address]  [facility] [address]      Dear Colleague,    Thank you for referring your patient, Melo Brewer, to the [facility]. Please see a copy of my visit note below.    S:Father hx gout.  Patient stepping out of truck twisted knee.  Presents with knee immobilizer and crutches.    Evaluated in ED:  \"This is a 64-year-old male, history as below, presenting with right knee pain. Patient states he has had a sore knee for about 2 to 3 weeks. He has been wearing a sleeve brace while he has been at work. Today he was stepping up into his truck when he felt a \"pop\" and pain in the right knee. He was able to drive but when he got out and tried to bear weight he had increased pain. He denies any other trauma. No history of issues with the knee in the past. He has never had any injections or surgeries. No numbness or tingling. No hip, ankle, foot pain. He took a couple of ibuprofen prior to coming to the ED. \"       Patient Active Problem List   Diagnosis     CARDIOVASCULAR SCREENING; LDL GOAL LESS THAN 130     Carotid artery stenosis          No past medical history on file.         Past Surgical History:   Procedure Laterality Date     BACK SURGERY       COLONOSCOPY N/A 5/19/2017    Procedure: COLONOSCOPY;  Colonoscopy;  Surgeon: Robert Elmore MD;  Location: [facility]     ENDARTERECTOMY CAROTID Right 2/26/2019    Procedure: RIGHT CAROTID ENDARTERECTOMY WITH EEG AND INTRAOPERATIVE ULTRASOUND;  Surgeon: Sae Ramos MD;  Location: [facility]     HC EXCIS PRIMARY GANGLION WRIST  12/23/2004    Left wrist     HC INJ EPIDURAL LUMBAR/SACRAL W/WO CONTRAST  2009     HC REMOVAL OF TONSILS,<13 Y/O  1967    Tonsils <13y.o.     SURGICAL HISTORY OF -   4/24/2009    Left C6-C7 Microdiskectomy.            Social History     Tobacco Use     Smoking status: Former     Current packs/day: 0.50     Average packs/day: 0.5 packs/day for 15.0 years (7.5 ttl pk-yrs)     Types: " Cigarettes     Smokeless tobacco: Never   Substance Use Topics     Alcohol use: Yes     Comment: 1-2 daily            Family History   Problem Relation Age of Onset     Hypertension Father         on medication HTN     Hyperlipidemia Father      Hypertension Brother                Allergies   Allergen Reactions     Apple Juice Anaphylaxis     Bees Anaphylaxis            Current Outpatient Medications   Medication Sig Dispense Refill     acetaminophen (TYLENOL) 650 MG CR tablet Take 1,300 mg by mouth 2 times daily       ASPIRIN LOW DOSE 81 MG EC tablet TAKE 1 TABLET (81 MG) BY MOUTH DAILY 90 tablet 3     atorvastatin (LIPITOR) 80 MG tablet TAKE 1 TABLET BY MOUTH EVERY DAY 90 tablet 0     celecoxib (CELEBREX) 200 MG capsule Take 1 capsule (200 mg) by mouth daily 10 capsule 0     diclofenac (VOLTAREN) 1 % topical gel Apply 2 g topically 4 times daily as needed for moderate pain 150 g 0     HYDROcodone-acetaminophen (NORCO) 5-325 MG tablet Take 1 tablet by mouth every 6 hours as needed for severe pain. 6 tablet 0     ibuprofen (ADVIL/MOTRIN) 200 MG tablet Take 4 tablets (800 mg) by mouth every 8 hours as needed for pain (with food)       lisinopril (ZESTRIL) 10 MG tablet TAKE 1 TABLET BY MOUTH EVERY DAY 90 tablet 2     metoprolol succinate ER (TOPROL XL) 25 MG 24 hr tablet TAKE 1 TABLET BY MOUTH EVERY DAY 90 tablet 1     celecoxib (CELEBREX) 200 MG capsule TAKE 1 CAPSULE (200 MG) BY MOUTH DAILY 10 capsule 0     cyclobenzaprine (FLEXERIL) 10 MG tablet Take 1 tablet (10 mg) by mouth 3 times daily as needed for muscle spasms (Patient not taking: Reported on 12/27/2024) 30 tablet 0     cyclobenzaprine (FLEXERIL) 10 MG tablet TAKE 1 TABLET (10 MG) BY MOUTH 3 TIMES DAILY AS NEEDED FOR MUSCLE SPASMS (Patient not taking: Reported on 12/27/2024) 30 tablet 0     escitalopram (LEXAPRO) 10 MG tablet TAKE 1 TABLET (10 MG) BY MOUTH DAILY (Patient not taking: Reported on 12/27/2024) 30 tablet 3     gabapentin (NEURONTIN) 100 MG  capsule Take 1 capsule (100 mg) by mouth 3 times daily as needed for neuropathic pain (Patient not taking: Reported on 12/27/2024) 30 capsule 0     gabapentin (NEURONTIN) 100 MG capsule TAKE 1 CAPSULE (100 MG) BY MOUTH 3 TIMES DAILY AS NEEDED FOR NEUROPATHIC PAIN (Patient not taking: Reported on 12/27/2024) 30 capsule 0     oxyCODONE (ROXICODONE) 5 MG tablet Take 1 tablet (5 mg) by mouth every 6 hours as needed (Patient not taking: Reported on 12/27/2024) 12 tablet 0          Review Of Systems  Skin: negative  Eyes: negative  Ears/Nose/Throat: negative  Respiratory: No shortness of breath, dyspnea on exertion, cough, or hemoptysis    O: Physical Exam:  Parapatellar pain to palpation, joint line pain to palpation R knee.  Adequate knee flexion and extension. CMS intact.  Effusion.    Lab:update inflammatory markers and arthritic profile    Images:     EXAM: XR KNEE RIGHT 3 VIEWS  LOCATION: [facility]  DATE: 12/25/2024     INDICATION: Pain.  COMPARISON: None.                                                                      IMPRESSION:  Large knee joint effusion. Joint spaces are maintained. No evidence of an acute fracture.    A:  right knee inflammatory arthritis with effusion      P:  Aspirate and inject R knee joint after verbal and written consent/ ethyl chloride and chloraprep  Send for crystalline analysis  Update inflammatory markers and arthritic profile  Notify if exacerbation symptoms  See back 6 weeks             In addition to the above assessment and plan each active problem on Melo's problem list was evaluated today. This included the questioning of Melo for any medication problems. We will continue the current treatment plan for these active problems except as noted.        Large Joint Injection/Arthocentesis: R knee joint    Date/Time: 12/27/2024 11:18 AM    Performed by: Gama Beard MD  Authorized by: Gama Beard MD    Indications:  Pain and  osteoarthritis  Needle Size:  22 G  Guidance: landmark guided    Location:  Knee      Medications:  3 mL lidocaine 1 %; 3 mL BUPivacaine 0.5 %; 40 mg triamcinolone 40 MG/ML  Outcome:  Tolerated well, no immediate complications  Procedure discussed: discussed risks, benefits, and alternatives    Consent Given by:  Patient  Timeout: timeout called immediately prior to procedure    Prep: patient was prepped and draped in usual sterile fashion    Aspiration: R knee joint    Date/Time: 12/27/2024 11:19 AM    Performed by: Gama Beard MD  Authorized by: Gama Beard MD    Indications:  Pain and osteoarthritis  Needle Size:  22 G  Guidance: landmark guided    Location:  Knee      Aspirate amount (mL):  40  Aspirate:  Blood-tinged  Aspirate analysis: sent for lab analysis    Outcome:  Tolerated well, no immediate complications  Procedure discussed: discussed risks, benefits, and alternatives    Consent Given by:  Patient  Timeout: timeout called immediately prior to procedure    Prep: patient was prepped and draped in usual sterile fashion          Again, thank you for allowing me to participate in the care of your patient.        Sincerely,        Gama Beard MD    Electronically signed

## 2024-12-27 NOTE — PATIENT INSTRUCTIONS
Thank you for choosing [facility]!      FOLLOW-UP  Dr. Beard would like you to follow-up in 2-3 months. Please stop by the  on your way out or call [phone] to schedule.       LAB WORK   Dr. Beard would like you to have some lab work completed. You can go to the Specialty Lab on the upper level to have this completed following your appointment, or you can call [phone] to schedule a future appointment if you do not have time to complete this today.

## 2024-12-27 NOTE — PROGRESS NOTES
Large Joint Injection/Arthocentesis: R knee joint    Date/Time: 12/27/2024 11:18 AM    Performed by: Gama Beard MD  Authorized by: Gama Beard MD    Indications:  Pain and osteoarthritis  Needle Size:  22 G  Guidance: landmark guided    Location:  Knee      Medications:  3 mL lidocaine 1 %; 3 mL BUPivacaine 0.5 %; 40 mg triamcinolone 40 MG/ML  Outcome:  Tolerated well, no immediate complications  Procedure discussed: discussed risks, benefits, and alternatives    Consent Given by:  Patient  Timeout: timeout called immediately prior to procedure    Prep: patient was prepped and draped in usual sterile fashion    Aspiration: R knee joint    Date/Time: 12/27/2024 11:19 AM    Performed by: Gama Beard MD  Authorized by: Gama Beard MD    Indications:  Pain and osteoarthritis  Needle Size:  22 G  Guidance: landmark guided    Location:  Knee      Aspirate amount (mL):  40  Aspirate:  Blood-tinged  Aspirate analysis: sent for lab analysis    Outcome:  Tolerated well, no immediate complications  Procedure discussed: discussed risks, benefits, and alternatives    Consent Given by:  Patient  Timeout: timeout called immediately prior to procedure    Prep: patient was prepped and draped in usual sterile fashion

## 2024-12-27 NOTE — PROGRESS NOTES
"S:Father hx gout.  Patient stepping out of truck twisted knee.  Presents with knee immobilizer and crutches.    Evaluated in ED:  \"This is a 64-year-old male, history as below, presenting with right knee pain. Patient states he has had a sore knee for about 2 to 3 weeks. He has been wearing a sleeve brace while he has been at work. Today he was stepping up into his truck when he felt a \"pop\" and pain in the right knee. He was able to drive but when he got out and tried to bear weight he had increased pain. He denies any other trauma. No history of issues with the knee in the past. He has never had any injections or surgeries. No numbness or tingling. No hip, ankle, foot pain. He took a couple of ibuprofen prior to coming to the ED. \"       Patient Active Problem List   Diagnosis    CARDIOVASCULAR SCREENING; LDL GOAL LESS THAN 130    Carotid artery stenosis          No past medical history on file.         Past Surgical History:   Procedure Laterality Date    BACK SURGERY      COLONOSCOPY N/A 5/19/2017    Procedure: COLONOSCOPY;  Colonoscopy;  Surgeon: Robert Elmore MD;  Location: [facility]    ENDARTERECTOMY CAROTID Right 2/26/2019    Procedure: RIGHT CAROTID ENDARTERECTOMY WITH EEG AND INTRAOPERATIVE ULTRASOUND;  Surgeon: Sae Ramos MD;  Location: [facility]    HC EXCIS PRIMARY GANGLION WRIST  12/23/2004    Left wrist    HC INJ EPIDURAL LUMBAR/SACRAL W/WO CONTRAST  2009    HC REMOVAL OF TONSILS,<13 Y/O  1967    Tonsils <13y.o.    SURGICAL HISTORY OF -   4/24/2009    Left C6-C7 Microdiskectomy.            Social History     Tobacco Use    Smoking status: Former     Current packs/day: 0.50     Average packs/day: 0.5 packs/day for 15.0 years (7.5 ttl pk-yrs)     Types: Cigarettes    Smokeless tobacco: Never   Substance Use Topics    Alcohol use: Yes     Comment: 1-2 daily            Family History   Problem Relation Age of Onset    Hypertension Father         on medication HTN    Hyperlipidemia Father     " Hypertension Brother                Allergies   Allergen Reactions    Apple Juice Anaphylaxis    Bees Anaphylaxis            Current Outpatient Medications   Medication Sig Dispense Refill    acetaminophen (TYLENOL) 650 MG CR tablet Take 1,300 mg by mouth 2 times daily      ASPIRIN LOW DOSE 81 MG EC tablet TAKE 1 TABLET (81 MG) BY MOUTH DAILY 90 tablet 3    atorvastatin (LIPITOR) 80 MG tablet TAKE 1 TABLET BY MOUTH EVERY DAY 90 tablet 0    celecoxib (CELEBREX) 200 MG capsule Take 1 capsule (200 mg) by mouth daily 10 capsule 0    diclofenac (VOLTAREN) 1 % topical gel Apply 2 g topically 4 times daily as needed for moderate pain 150 g 0    HYDROcodone-acetaminophen (NORCO) 5-325 MG tablet Take 1 tablet by mouth every 6 hours as needed for severe pain. 6 tablet 0    ibuprofen (ADVIL/MOTRIN) 200 MG tablet Take 4 tablets (800 mg) by mouth every 8 hours as needed for pain (with food)      lisinopril (ZESTRIL) 10 MG tablet TAKE 1 TABLET BY MOUTH EVERY DAY 90 tablet 2    metoprolol succinate ER (TOPROL XL) 25 MG 24 hr tablet TAKE 1 TABLET BY MOUTH EVERY DAY 90 tablet 1    celecoxib (CELEBREX) 200 MG capsule TAKE 1 CAPSULE (200 MG) BY MOUTH DAILY 10 capsule 0    cyclobenzaprine (FLEXERIL) 10 MG tablet Take 1 tablet (10 mg) by mouth 3 times daily as needed for muscle spasms (Patient not taking: Reported on 12/27/2024) 30 tablet 0    cyclobenzaprine (FLEXERIL) 10 MG tablet TAKE 1 TABLET (10 MG) BY MOUTH 3 TIMES DAILY AS NEEDED FOR MUSCLE SPASMS (Patient not taking: Reported on 12/27/2024) 30 tablet 0    escitalopram (LEXAPRO) 10 MG tablet TAKE 1 TABLET (10 MG) BY MOUTH DAILY (Patient not taking: Reported on 12/27/2024) 30 tablet 3    gabapentin (NEURONTIN) 100 MG capsule Take 1 capsule (100 mg) by mouth 3 times daily as needed for neuropathic pain (Patient not taking: Reported on 12/27/2024) 30 capsule 0    gabapentin (NEURONTIN) 100 MG capsule TAKE 1 CAPSULE (100 MG) BY MOUTH 3 TIMES DAILY AS NEEDED FOR NEUROPATHIC PAIN  (Patient not taking: Reported on 12/27/2024) 30 capsule 0    oxyCODONE (ROXICODONE) 5 MG tablet Take 1 tablet (5 mg) by mouth every 6 hours as needed (Patient not taking: Reported on 12/27/2024) 12 tablet 0          Review Of Systems  Skin: negative  Eyes: negative  Ears/Nose/Throat: negative  Respiratory: No shortness of breath, dyspnea on exertion, cough, or hemoptysis    O: Physical Exam:  Parapatellar pain to palpation, joint line pain to palpation R knee.  Adequate knee flexion and extension. CMS intact.  Effusion.    Lab:update inflammatory markers and arthritic profile    Images:     EXAM: XR KNEE RIGHT 3 VIEWS  LOCATION: [facility]  DATE: 12/25/2024     INDICATION: Pain.  COMPARISON: None.                                                                      IMPRESSION:  Large knee joint effusion. Joint spaces are maintained. No evidence of an acute fracture.    A:  right knee inflammatory arthritis with effusion      P:  Aspirate and inject R knee joint after verbal and written consent/ ethyl chloride and chloraprep  Send for crystalline analysis  Update inflammatory markers and arthritic profile  Notify if exacerbation symptoms  See back 6 weeks             In addition to the above assessment and plan each active problem on Melo's problem list was evaluated today. This included the questioning of Melo for any medication problems. We will continue the current treatment plan for these active problems except as noted.

## 2024-12-28 RX ORDER — LIDOCAINE HYDROCHLORIDE 10 MG/ML
3 INJECTION, SOLUTION INFILTRATION; PERINEURAL
Status: COMPLETED | OUTPATIENT
Start: 2024-12-27 | End: 2024-12-27

## 2024-12-28 RX ORDER — TRIAMCINOLONE ACETONIDE 40 MG/ML
40 INJECTION, SUSPENSION INTRA-ARTICULAR; INTRAMUSCULAR
Status: COMPLETED | OUTPATIENT
Start: 2024-12-27 | End: 2024-12-27

## 2024-12-28 RX ORDER — BUPIVACAINE HYDROCHLORIDE 5 MG/ML
3 INJECTION, SOLUTION PERINEURAL
Status: COMPLETED | OUTPATIENT
Start: 2024-12-27 | End: 2024-12-27

## 2025-01-09 ENCOUNTER — OFFICE VISIT (OUTPATIENT)
Dept: ORTHOPEDICS | Facility: CLINIC | Age: 65
End: 2025-01-09
Payer: COMMERCIAL

## 2025-01-09 VITALS
RESPIRATION RATE: 18 BRPM | DIASTOLIC BLOOD PRESSURE: 69 MMHG | HEART RATE: 79 BPM | WEIGHT: 170 LBS | BODY MASS INDEX: 26.68 KG/M2 | SYSTOLIC BLOOD PRESSURE: 118 MMHG | HEIGHT: 67 IN

## 2025-01-09 NOTE — PROGRESS NOTES
Large Joint Injection/Arthocentesis: R knee joint    Date/Time: 1/9/2025 2:00 PM    Performed by: Wilbert Young MD  Authorized by: Wilbert Young MD    Indications:  Pain  Needle Size:  22 G  Guidance: landmark guided    Location:  Knee      Medications:  5 mL lidocaine 1 %; 80 mg methylPREDNISolone 80 MG/ML  Outcome:  Tolerated well, no immediate complications  Procedure discussed: discussed risks, benefits, and alternatives    Consent Given by:  Patient  Timeout: timeout called immediately prior to procedure    Prep: patient was prepped and draped in usual sterile fashion         Unresponsive - The patient is nonverbal and does not respond even when a painful stimulus is applied.

## 2025-01-09 NOTE — Clinical Note
1/9/2025      Melo Brewer  [address]  [facility] [address]      Dear Colleague,    Thank you for referring your patient, Melo Brewer, to the [facility]. Please see a copy of my visit note below.    Large Joint Injection/Arthocentesis: R knee joint    Date/Time: 1/9/2025 2:00 PM    Performed by: Wilbert Young MD  Authorized by: Wilbert Young MD    Indications:  Pain  Needle Size:  22 G  Guidance: landmark guided    Location:  Knee      Medications:  5 mL lidocaine 1 %; 80 mg methylPREDNISolone 80 MG/ML  Outcome:  Tolerated well, no immediate complications  Procedure discussed: discussed risks, benefits, and alternatives    Consent Given by:  Patient  Timeout: timeout called immediately prior to procedure    Prep: patient was prepped and draped in usual sterile fashion            Again, thank you for allowing me to participate in the care of your patient.        Sincerely,        Wilbert Young MD    Electronically signed

## 2025-01-09 NOTE — PATIENT INSTRUCTIONS
You have had a steroid injection today.  For the first 2 hours there will likely be some numbing in the joint from the lidocaine.  This is a good sign, indicating that the injection is in the right place.  In 2 hours the lidocaine will wear off, and the joint will hurt like you had a shot.  Each day the cortisone makes it feel better.  It reaches peak effect in 2 weeks.  We expect it to last for 3 months.  You may resume regular activity when you feel ready.  If you are diabetic, your glucoses will be quite high for several days.    If this fails, consider MRI.

## 2025-01-15 ENCOUNTER — TELEPHONE (OUTPATIENT)
Dept: ADMISSION | Facility: CLINIC | Age: 65
End: 2025-01-15
Payer: COMMERCIAL

## 2025-01-15 DIAGNOSIS — M23.91 INTERNAL DERANGEMENT OF RIGHT KNEE: Primary | ICD-10-CM

## 2025-01-15 NOTE — TELEPHONE ENCOUNTER
Reason for Call:  Other call back    Detailed comments: pt calling to update that injection did not work - at visit, they stated it was discussed that if injection did not work that order for MRI would be placed. He is requesting order for MRI be placed- Please call and let pt know when/if placed so that he can schedule    Phone Number Patient can be reached at: Other phone number:  [phone]     Best Time: any    Can we leave a detailed message on this number? YES    Call taken on 1/15/2025 at 3:59 PM by Shahana Barreto

## 2025-01-16 NOTE — TELEPHONE ENCOUNTER
"Per office visit note from 1/9/25:     \"Acute right knee pain after stepping down from his truck.  He has not responded so far to ibuprofen or a steroid injection.  I am suspicious that he may have a mechanical issue going on.  We discussed ordering MRI scan, but he would prefer to try a steroid injection again.  He  desires injection today of right knee(s).  Risks, benefits, potential complications and alternatives were discussed.   With the patient's consent, sterile prep was performed of right knee(s).  Right knee was injected with Depo Medrol 80 mg and lidocaine at anteromedial site.  Return to clinic as needed.\"    Ok for MRI order at this time? Or should patient allow another week to see if injection in effective prior to order being placed? Order pended, please review and advise.     Soledad Mata RN   [facility]  "

## 2025-01-24 ENCOUNTER — HOSPITAL ENCOUNTER (OUTPATIENT)
Dept: MRI IMAGING | Facility: CLINIC | Age: 65
Discharge: HOME | End: 2025-01-24
Attending: ORTHOPAEDIC SURGERY | Admitting: ORTHOPAEDIC SURGERY
Payer: COMMERCIAL

## 2025-01-24 DIAGNOSIS — M23.91 INTERNAL DERANGEMENT OF RIGHT KNEE: ICD-10-CM

## 2025-01-24 PROCEDURE — 73721 MRI JNT OF LWR EXTRE W/O DYE: CPT | Mod: 26 | Performed by: RADIOLOGY

## 2025-01-24 PROCEDURE — 73721 MRI JNT OF LWR EXTRE W/O DYE: CPT | Mod: RT

## 2025-01-28 ENCOUNTER — OFFICE VISIT (OUTPATIENT)
Dept: ORTHOPEDICS | Facility: CLINIC | Age: 65
End: 2025-01-28
Payer: COMMERCIAL

## 2025-01-28 ENCOUNTER — TELEPHONE (OUTPATIENT)
Dept: ORTHOPEDICS | Facility: CLINIC | Age: 65
End: 2025-01-28

## 2025-01-28 VITALS
SYSTOLIC BLOOD PRESSURE: 151 MMHG | WEIGHT: 170 LBS | RESPIRATION RATE: 18 BRPM | HEIGHT: 67 IN | HEART RATE: 95 BPM | BODY MASS INDEX: 26.68 KG/M2 | DIASTOLIC BLOOD PRESSURE: 73 MMHG

## 2025-01-28 DIAGNOSIS — M25.561 ACUTE PAIN OF RIGHT KNEE: Primary | ICD-10-CM

## 2025-01-28 PROCEDURE — 99213 OFFICE O/P EST LOW 20 MIN: CPT | Performed by: ORTHOPAEDIC SURGERY

## 2025-01-28 NOTE — PROGRESS NOTES
"ORTHOPEDIC CONSULT      Chief Complaint: Melo Brewer is a 64 year old male who is being seen for   Chief Complaint   Patient presents with    Right Knee - Pain     Knee gave out, DOI: 12/25/2024 - Referred by Dr. Young       History of Present Illness:   Presents with right knee pain.  He reports December 25 he was getting in his truck when he felt an acute onset of pain to his knee.  Prior to that he had 2 weeks of \"soreness\".  Located medial.  Nonradiating.  No falls associated with it.  He has been taken some ibuprofen.  He said 2 intra-articular corticosteroid injections.  The last 1 provided no relief.  He has been limping.  Does present with an MRI.    Epic reviewed.  Underwent right knee intra-articular corticosteroid injection on December 27, 2024 as well as January 9, 2025.  Patient's past medical, surgical, social and family histories reviewed.     No past medical history on file.    Past Surgical History:   Procedure Laterality Date    BACK SURGERY      COLONOSCOPY N/A 5/19/2017    Procedure: COLONOSCOPY;  Colonoscopy;  Surgeon: Robert Elmore MD;  Location: [facility]    ENDARTERECTOMY CAROTID Right 2/26/2019    Procedure: RIGHT CAROTID ENDARTERECTOMY WITH EEG AND INTRAOPERATIVE ULTRASOUND;  Surgeon: Sae Ramos MD;  Location: [facility]    HC EXCIS PRIMARY GANGLION WRIST  12/23/2004    Left wrist    HC INJ EPIDURAL LUMBAR/SACRAL W/WO CONTRAST  2009    HC REMOVAL OF TONSILS,<14 Y/O  1967    Tonsils <13y.o.    SURGICAL HISTORY OF -   4/24/2009    Left C6-C7 Microdiskectomy.       Medications:  Current Outpatient Medications   Medication Sig Dispense Refill    ASPIRIN LOW DOSE 81 MG EC tablet TAKE 1 TABLET (81 MG) BY MOUTH DAILY 90 tablet 3    atorvastatin (LIPITOR) 80 MG tablet TAKE 1 TABLET BY MOUTH EVERY DAY 90 tablet 0    escitalopram (LEXAPRO) 10 MG tablet TAKE 1 TABLET (10 MG) BY MOUTH DAILY 30 tablet 3    lisinopril (ZESTRIL) 10 MG tablet TAKE 1 TABLET BY MOUTH EVERY DAY 90 tablet 2    " "metoprolol succinate ER (TOPROL XL) 25 MG 24 hr tablet TAKE 1 TABLET BY MOUTH EVERY DAY 90 tablet 1    acetaminophen (TYLENOL) 650 MG CR tablet Take 1,300 mg by mouth 2 times daily      cyclobenzaprine (FLEXERIL) 10 MG tablet Take 1 tablet (10 mg) by mouth 3 times daily as needed for muscle spasms 30 tablet 0    cyclobenzaprine (FLEXERIL) 10 MG tablet TAKE 1 TABLET (10 MG) BY MOUTH 3 TIMES DAILY AS NEEDED FOR MUSCLE SPASMS 30 tablet 0    diclofenac (VOLTAREN) 1 % topical gel Apply 2 g topically 4 times daily as needed for moderate pain 150 g 0    ibuprofen (ADVIL/MOTRIN) 200 MG tablet Take 4 tablets (800 mg) by mouth every 8 hours as needed for pain (with food)       No current facility-administered medications for this visit.       Allergies   Allergen Reactions    Apple Juice Anaphylaxis    Bees Anaphylaxis       Social History     Occupational History    Not on file   Tobacco Use    Smoking status: Former     Current packs/day: 0.50     Average packs/day: 0.5 packs/day for 15.0 years (7.5 ttl pk-yrs)     Types: Cigarettes    Smokeless tobacco: Never   Substance and Sexual Activity    Alcohol use: Yes     Comment: 1-2 daily    Drug use: No    Sexual activity: Yes     Partners: Female       Family History   Problem Relation Age of Onset    Hypertension Father         on medication HTN    Hyperlipidemia Father     Hypertension Brother        REVIEW OF SYSTEMS  10 point review systems performed otherwise negative as noted as per history of present illness.    Physical Exam:  Vitals: BP (!) 156/89   Pulse 57   Temp 97.7  F (36.5  C)   Ht 1.695 m (5' 6.75\")   Wt 77.1 kg (170 lb)   BMI 26.83 kg/m    BMI= Body mass index is 26.83 kg/m .  Constitutional: healthy, alert and no acute distress   Psychiatric: mentation appears normal and affect normal/bright  NEURO: no focal deficits  RESP: Normal with easy respirations and no use of accessory muscles to breathe, no audible wheezing or retractions  CV: RLE: no edema      "    Regular rate and rhythm by palpation  SKIN: No erythema, rashes, excoriation, or breakdown. No evidence of infection.   JOINT/EXTREMITIES:right knee: No gross deformity.  No effusion.  No soft tissue or bursal swelling.  Full active range of motion although some hesitancy with motion.  Patella tracks midline.  No lateral sided pain.  Fairly significant tenderness over the medial tibial plateau.  Some more minor tenderness over the medial joint line.  No instability with varus and valgus testing.  Negative Lachman.  Equivocal Yuan     GAIT: antalgic    Diagnostic Modalities:    Right knee x-ray: Weightbearing x-rays taken today in the clinic shows no acute fractures or dislocations.  Mild medial compartment joint space narrowing.    MR right knee without contrast 1/24/2025 12:51 PM     Techniques: Multiplanar multisequence imaging of the right knee was  obtained without administration of intra-articular or intravenous  contrast using routing protocol.     History: Internal derangement of right knee     Comparison: 12/25/2024     Findings:     MENISCI:  Medial meniscus: Full-thickness radial tear posterior horn/root  ligament junction of medial meniscus with tear extension into the  posterior root ligament and 5 mm peripheral extrusion of meniscal  body. Additional intrasubstance signal posterior horn and body of the  medial meniscus with tibial articular surface abnormal signal  violation in the body, indicating additional horizontal tear.  Lateral meniscus: Intact.     LIGAMENTS  Cruciate ligaments: Intact.  Medial supporting structures: Low to moderate grade sprain tibial  collateral ligament, medial retinaculum and posterior oblique  ligament, indicated by patient ligamentous edema. Small  tibiocollateral ligament and trace pes anserine bursal effusion. These  changes are likely related to underlying meniscal pathology resulting  in altered biomechanics. Semimembranosus direct arm tendinosis.  Lateral  supporting structures: Posterolateral capsular edema/fluid,  consistent with capsular tear/injury. Otherwise fibular collateral  ligament, biceps femoris and conjoined tendons, popliteus tendon and  iliotibial band, popliteofibular ligament are intact.     EXTENSOR MECHANISM  Intact. Suprapatellar enthesopathy.     FLUID  No substantial joint effusion. No substantial Baker's cyst.     OSSEOUS and ARTICULAR STRUCTURES  Bones: Medial tibial plateau subchondral insufficiency fracture (image  22 series 5). Subcortical edema-like marrow signal intensity in the  intercondylar area at the distal anterior cruciate ligament  attachment.     Patellofemoral compartment: Moderate to high-grade chondral loss  medial patellar facet.     Medial compartment: Moderate chondral loss centrally within surface of  the medial femoral condyle.     Lateral compartment: No high-grade hyaline cartilage disease.     ANCILLARY FINDINGS  Subcutaneous edema especially anteriorly and medially. Mild  of  popliteus and mild edema and fatty infiltration of soleus muscle.  Focal fatty replacement semimembranosus muscle, likely sequela of  remote trauma.     Pericruciate fat pad edema.                                                                      Impression:  1. Full-thickness radial tear posterior horn/root ligament junction of  medial meniscus with tear extension into the posterior root ligament.  *  Additional horizontal tear of the meniscal body.  2. Medial tibial plateau subchondral insufficiency fracture.  3. Posterolateral capsular edema/fluid, consistent with capsular  tear/injury.   4. Grade 2/3 chondromalacia patellofemoral and medial compartments.  Independent visualization of the images was performed.      Impression: right knee complex posterior horn/root tear with horizontal meniscal body tear medial meniscus, medial tibial plateau subchondral insufficiency fracture, grade II/III chondromalacia medial compartment, patellofemoral  compartment    Plan:  All of the above pertinent physical exam and imaging modalities findings was reviewed with Melo and his sister.    We discussed his MRI findings.  Multiple etiologies in same location certainly could be causing overlapping pain.  We discussed this at this point recommend some conservative care including activity modification with rest.  We discussed his tibial plateau subchondral insufficiency fracture.  I would like to see if this would improve especially based on his point tenderness over the tibial plateau which seems to reproduce a significant portion of his pain.  We discussed a medial  brace.  Discussed crutches.  Work note provided with no work until follow-up in 4 weeks.    Plan to see how he progresses from there.  If improving would recommend further time for recovery.  If plateauing out could consider knee arthroscopy partial medial meniscectomy.  We also discussed meniscal repair.  However with the MRI findings of his tear as well as his grade 3 medial compartment chondromalacia would recommend the partial meniscectomy.    He is going to drop off some short term disability forms for work.  On these forms will anticipate up to 8 weeks for full recovery.        Return to clinic 3, 4, week(s), or sooner as needed for changes.  Re-x-ray on return: No    Darnell Zhu D.O.

## 2025-01-28 NOTE — TELEPHONE ENCOUNTER
Dr Salguero's office called.  When scheduled staff told them  does not see patient's over 60.  They verified with team and he will not be able to see him.  His wife Christelle called [facility] because his team notified her that Dr Zhu does see patients for Meniscus tears as well.    Mary Sanford

## 2025-01-28 NOTE — PROGRESS NOTES
"Melo Brewer is a 64 year old male who is seen as self referral for right knee pain.  This started in early December when he stepped out of his truck and felt a pop and pain in his right knee.  He was able to drive but had significant pain when he tried to walk.  He had no other trauma that he knows of.  He did take ibuprofen on a regular basis.  He saw Dr. Beard on 12/27/2024 with a Kenalog injection at that time.  This worked for a bit but is worn off and he is seen back now for evaluation.  He describes sharp pains rated 5-6 out of 10.  Most pain is with walking.  Better with rest.    X-ray of the knee shows very mild spurring at the lateral patella.  Otherwise the joint is very well-preserved.    I saw him on 1/9/25 and recommended an MRI.  He wanted to try another steroid injection.  This was performed with 80 mg Depomedrol.  Within a week, it was still very painful and he decided to proceed with MRI.    He now returns for his right knee MRI results.  MRI images were independently visualized with the patient.  These showed a medial meniscus tear, the medial meniscus tear consists of primarily a posterior root tear.  There is meniscus degeneration, but I do not see significant horizontal cleavage tear.  mild chondromalacia in the medial compartment, and mild chondromalacia in the patellofemoral compartment.  Stress reaction in medial tibia without definite fracture.      Exam:  Vitals: BP (!) 151/73   Pulse 95   Resp 18   Ht 1.695 m (5' 6.75\")   Wt 77.1 kg (170 lb)   BMI 26.83 kg/m    BMI= Body mass index is 26.83 kg/m .  Constitutional:  healthy, alert and no distress  Neuro: Alert and Oriented x 3, no focal defects.  Psych: Affect normal   Respiratory: Breathing not labored.  Cardiovascular: normal peripheral pulses  Lymph: no adenopathy  Skin: No rashes,worrisome lesions or skin problems  He has tenderness under the patella and especially along the medial joint line.  There is a small effusion.  He has " no ligamentous laxity of MCL, LCL, or cruciates.  He has medial pain with lateral Yuan, negative pain with medial Yuan.  Sensation, motor and circulation are intact.    Impression:  Right Knee: medial meniscus tear.  This is primarily a posterior root tear.  The MRI scan indicated horizontal cleavage tear, but I do not see significant tearing of this.  They also indicated possible insufficiency fracture but I think it is just a stress reaction medially.    Thus, our plan is referral to Dr. Salguero for possible posterior root tear repair.    Total time spent was 20 minutes; with 20 minutes spent face-to-face with patient discussing test results, treatment options, and estimated recovery time.

## 2025-01-28 NOTE — TELEPHONE ENCOUNTER
Other: Patient was referred by Wilbert Young MD to see the provider. Patient has an appointment on 2/6/25. Please let patient know if this does not work.      Could we send this information to you in [employer] or would you prefer to receive a phone call?:   Patient would prefer a phone call   Okay to leave a detailed message?: Yes at Cell number on file:    Telephone Information:   Mobile [phone]

## 2025-01-28 NOTE — LETTER
"1/28/2025      Melo Brewer  [address]  [facility] [address]      Dear Colleague,    Thank you for referring your patient, Melo Brewer, to the [facility]. Please see a copy of my visit note below.    Melo Brewer is a 64 year old male who is seen as self referral for right knee pain.  This started in early December when he stepped out of his truck and felt a pop and pain in his right knee.  He was able to drive but had significant pain when he tried to walk.  He had no other trauma that he knows of.  He did take ibuprofen on a regular basis.  He saw Dr. Beard on 12/27/2024 with a Kenalog injection at that time.  This worked for a bit but is worn off and he is seen back now for evaluation.  He describes sharp pains rated 5-6 out of 10.  Most pain is with walking.  Better with rest.    X-ray of the knee shows very mild spurring at the lateral patella.  Otherwise the joint is very well-preserved.    I saw him on 1/9/25 and recommended an MRI.  He wanted to try another steroid injection.  This was performed with 80 mg Depomedrol.  Within a week, it was still very painful and he decided to proceed with MRI.    He now returns for his right knee MRI results.  MRI images were independently visualized with the patient.  These showed a medial meniscus tear, the medial meniscus tear consists of primarily a posterior root tear.  There is meniscus degeneration, but I do not see significant horizontal cleavage tear.  mild chondromalacia in the medial compartment, and mild chondromalacia in the patellofemoral compartment.  Stress reaction in medial tibia without definite fracture.      Exam:  Vitals: BP (!) 151/73   Pulse 95   Resp 18   Ht 1.695 m (5' 6.75\")   Wt 77.1 kg (170 lb)   BMI 26.83 kg/m    BMI= Body mass index is 26.83 kg/m .  Constitutional:  healthy, alert and no distress  Neuro: Alert and Oriented x 3, no focal defects.  Psych: Affect normal   Respiratory: Breathing not " labored.  Cardiovascular: normal peripheral pulses  Lymph: no adenopathy  Skin: No rashes,worrisome lesions or skin problems  He has tenderness under the patella and especially along the medial joint line.  There is a small effusion.  He has no ligamentous laxity of MCL, LCL, or cruciates.  He has medial pain with lateral Yuan, negative pain with medial Yuan.  Sensation, motor and circulation are intact.    Impression:  Right Knee: medial meniscus tear.  This is primarily a posterior root tear.  The MRI scan indicated horizontal cleavage tear, but I do not see significant tearing of this.  They also indicated possible insufficiency fracture but I think it is just a stress reaction medially.    Thus, our plan is referral to Dr. Salguero for possible posterior root tear repair.    Total time spent was 20 minutes; with 20 minutes spent face-to-face with patient discussing test results, treatment options, and estimated recovery time.    Again, thank you for allowing me to participate in the care of your patient.        Sincerely,        Wilbert Young MD    Electronically signed

## 2025-01-29 ENCOUNTER — ANCILLARY PROCEDURE (OUTPATIENT)
Dept: GENERAL RADIOLOGY | Facility: CLINIC | Age: 65
End: 2025-01-29
Attending: NURSE PRACTITIONER
Payer: COMMERCIAL

## 2025-01-29 ENCOUNTER — TELEPHONE (OUTPATIENT)
Dept: ADMISSION | Facility: CLINIC | Age: 65
End: 2025-01-29

## 2025-01-29 ENCOUNTER — TELEPHONE (OUTPATIENT)
Dept: ORTHOPEDICS | Facility: CLINIC | Age: 65
End: 2025-01-29

## 2025-01-29 ENCOUNTER — OFFICE VISIT (OUTPATIENT)
Dept: ORTHOPEDICS | Facility: CLINIC | Age: 65
End: 2025-01-29
Attending: ORTHOPAEDIC SURGERY
Payer: COMMERCIAL

## 2025-01-29 VITALS
HEART RATE: 57 BPM | SYSTOLIC BLOOD PRESSURE: 156 MMHG | BODY MASS INDEX: 26.68 KG/M2 | WEIGHT: 170 LBS | DIASTOLIC BLOOD PRESSURE: 89 MMHG | HEIGHT: 67 IN | TEMPERATURE: 97.7 F

## 2025-01-29 DIAGNOSIS — S83.231A COMPLEX TEAR OF MEDIAL MENISCUS OF RIGHT KNEE AS CURRENT INJURY, INITIAL ENCOUNTER: ICD-10-CM

## 2025-01-29 DIAGNOSIS — M84.461A INSUFFICIENCY FRACTURE OF RIGHT TIBIA: ICD-10-CM

## 2025-01-29 DIAGNOSIS — M25.561 ACUTE PAIN OF RIGHT KNEE: ICD-10-CM

## 2025-01-29 DIAGNOSIS — M25.561 ACUTE PAIN OF RIGHT KNEE: Primary | ICD-10-CM

## 2025-01-29 DIAGNOSIS — M94.261 CHONDROMALACIA OF RIGHT KNEE: Primary | ICD-10-CM

## 2025-01-29 PROCEDURE — 99214 OFFICE O/P EST MOD 30 MIN: CPT | Performed by: ORTHOPAEDIC SURGERY

## 2025-01-29 PROCEDURE — 73564 X-RAY EXAM KNEE 4 OR MORE: CPT | Mod: TC | Performed by: RADIOLOGY

## 2025-01-29 ASSESSMENT — PAIN SCALES - GENERAL: PAINLEVEL_OUTOF10: SEVERE PAIN (7)

## 2025-01-29 NOTE — TELEPHONE ENCOUNTER
Reason for Call:  Other call back    Detailed comments: patients wife called, he was seen today 01/29/25 by  and was told he needs to get a leg brace asap. They called [facility] and they are booking until march. Please look into this and call them back at [phone]    Phone Number Patient can be reached at: Cell number on file:    Telephone Information:   Mobile [phone]       Best Time: any    Can we leave a detailed message on this number? YES    Call taken on 1/29/2025 at 3:05 PM by Raquel Robertson

## 2025-01-29 NOTE — LETTER
January 29, 2025      Melo DE SANTIAGO Daniella  [address] [facility] [address]        To Whom It May Concern:    Melo DE SANTIAGO Daniella was seen in our clinic. He will be out of work for 4 weeks. He will follow up in 3-4 weeks for further recommendation of workability.       Sincerely,        Darnell Zhu D.O.

## 2025-01-29 NOTE — TELEPHONE ENCOUNTER
Spoke with orthotics team and was advised that patient is not able to be worked in sooner than next available as [facility] will be out of clinic. There are sooner openings at other orthotics locations. Called and spoke with patient's wife, Christelle, who states that they would be willing to travel to another location. She was transferred to orthotics team for assistance in scheduling.     Soledad Mata RN   [facility]

## 2025-01-29 NOTE — LETTER
"1/29/2025      Melo Brewer  [address]  [facility] [address]      Dear Colleague,    Thank you for referring your patient, Melo Brewer, to the [facility]. Please see a copy of my visit note below.    ORTHOPEDIC CONSULT      Chief Complaint: Melo Brewer is a 64 year old male who is being seen for   Chief Complaint   Patient presents with     Right Knee - Pain     Knee gave out, DOI: 12/25/2024 - Referred by Dr. Young       History of Present Illness:   Presents with right knee pain.  He reports December 25 he was getting in his truck when he felt an acute onset of pain to his knee.  Prior to that he had 2 weeks of \"soreness\".  Located medial.  Nonradiating.  No falls associated with it.  He has been taken some ibuprofen.  He said 2 intra-articular corticosteroid injections.  The last 1 provided no relief.  He has been limping.  Does present with an MRI.    Epic reviewed.  Underwent right knee intra-articular corticosteroid injection on December 27, 2024 as well as January 9, 2025.  Patient's past medical, surgical, social and family histories reviewed.     No past medical history on file.    Past Surgical History:   Procedure Laterality Date     BACK SURGERY       COLONOSCOPY N/A 5/19/2017    Procedure: COLONOSCOPY;  Colonoscopy;  Surgeon: Robert Elmore MD;  Location: [facility]     ENDARTERECTOMY CAROTID Right 2/26/2019    Procedure: RIGHT CAROTID ENDARTERECTOMY WITH EEG AND INTRAOPERATIVE ULTRASOUND;  Surgeon: Sae Ramos MD;  Location: [facility]     HC EXCIS PRIMARY GANGLION WRIST  12/23/2004    Left wrist     HC INJ EPIDURAL LUMBAR/SACRAL W/WO CONTRAST  2009     HC REMOVAL OF TONSILS,<12 Y/O  1967    Tonsils <13y.o.     SURGICAL HISTORY OF -   4/24/2009    Left C6-C7 Microdiskectomy.       Medications:  Current Outpatient Medications   Medication Sig Dispense Refill     ASPIRIN LOW DOSE 81 MG EC tablet TAKE 1 TABLET (81 MG) BY MOUTH DAILY 90 tablet 3     atorvastatin " "(LIPITOR) 80 MG tablet TAKE 1 TABLET BY MOUTH EVERY DAY 90 tablet 0     escitalopram (LEXAPRO) 10 MG tablet TAKE 1 TABLET (10 MG) BY MOUTH DAILY 30 tablet 3     lisinopril (ZESTRIL) 10 MG tablet TAKE 1 TABLET BY MOUTH EVERY DAY 90 tablet 2     metoprolol succinate ER (TOPROL XL) 25 MG 24 hr tablet TAKE 1 TABLET BY MOUTH EVERY DAY 90 tablet 1     acetaminophen (TYLENOL) 650 MG CR tablet Take 1,300 mg by mouth 2 times daily       cyclobenzaprine (FLEXERIL) 10 MG tablet Take 1 tablet (10 mg) by mouth 3 times daily as needed for muscle spasms 30 tablet 0     cyclobenzaprine (FLEXERIL) 10 MG tablet TAKE 1 TABLET (10 MG) BY MOUTH 3 TIMES DAILY AS NEEDED FOR MUSCLE SPASMS 30 tablet 0     diclofenac (VOLTAREN) 1 % topical gel Apply 2 g topically 4 times daily as needed for moderate pain 150 g 0     ibuprofen (ADVIL/MOTRIN) 200 MG tablet Take 4 tablets (800 mg) by mouth every 8 hours as needed for pain (with food)       No current facility-administered medications for this visit.       Allergies   Allergen Reactions     Apple Juice Anaphylaxis     Bees Anaphylaxis       Social History     Occupational History     Not on file   Tobacco Use     Smoking status: Former     Current packs/day: 0.50     Average packs/day: 0.5 packs/day for 15.0 years (7.5 ttl pk-yrs)     Types: Cigarettes     Smokeless tobacco: Never   Substance and Sexual Activity     Alcohol use: Yes     Comment: 1-2 daily     Drug use: No     Sexual activity: Yes     Partners: Female       Family History   Problem Relation Age of Onset     Hypertension Father         on medication HTN     Hyperlipidemia Father      Hypertension Brother        REVIEW OF SYSTEMS  10 point review systems performed otherwise negative as noted as per history of present illness.    Physical Exam:  Vitals: BP (!) 156/89   Pulse 57   Temp 97.7  F (36.5  C)   Ht 1.695 m (5' 6.75\")   Wt 77.1 kg (170 lb)   BMI 26.83 kg/m    BMI= Body mass index is 26.83 kg/m .  Constitutional: " healthy, alert and no acute distress   Psychiatric: mentation appears normal and affect normal/bright  NEURO: no focal deficits  RESP: Normal with easy respirations and no use of accessory muscles to breathe, no audible wheezing or retractions  CV: RLE: no edema         Regular rate and rhythm by palpation  SKIN: No erythema, rashes, excoriation, or breakdown. No evidence of infection.   JOINT/EXTREMITIES:right knee: No gross deformity.  No effusion.  No soft tissue or bursal swelling.  Full active range of motion although some hesitancy with motion.  Patella tracks midline.  No lateral sided pain.  Fairly significant tenderness over the medial tibial plateau.  Some more minor tenderness over the medial joint line.  No instability with varus and valgus testing.  Negative Lachman.  Equivocal Yuan     GAIT: antalgic    Diagnostic Modalities:    Right knee x-ray: Weightbearing x-rays taken today in the clinic shows no acute fractures or dislocations.  Mild medial compartment joint space narrowing.    MR right knee without contrast 1/24/2025 12:51 PM     Techniques: Multiplanar multisequence imaging of the right knee was  obtained without administration of intra-articular or intravenous  contrast using routing protocol.     History: Internal derangement of right knee     Comparison: 12/25/2024     Findings:     MENISCI:  Medial meniscus: Full-thickness radial tear posterior horn/root  ligament junction of medial meniscus with tear extension into the  posterior root ligament and 5 mm peripheral extrusion of meniscal  body. Additional intrasubstance signal posterior horn and body of the  medial meniscus with tibial articular surface abnormal signal  violation in the body, indicating additional horizontal tear.  Lateral meniscus: Intact.     LIGAMENTS  Cruciate ligaments: Intact.  Medial supporting structures: Low to moderate grade sprain tibial  collateral ligament, medial retinaculum and posterior oblique  ligament,  indicated by patient ligamentous edema. Small  tibiocollateral ligament and trace pes anserine bursal effusion. These  changes are likely related to underlying meniscal pathology resulting  in altered biomechanics. Semimembranosus direct arm tendinosis.  Lateral supporting structures: Posterolateral capsular edema/fluid,  consistent with capsular tear/injury. Otherwise fibular collateral  ligament, biceps femoris and conjoined tendons, popliteus tendon and  iliotibial band, popliteofibular ligament are intact.     EXTENSOR MECHANISM  Intact. Suprapatellar enthesopathy.     FLUID  No substantial joint effusion. No substantial Baker's cyst.     OSSEOUS and ARTICULAR STRUCTURES  Bones: Medial tibial plateau subchondral insufficiency fracture (image  22 series 5). Subcortical edema-like marrow signal intensity in the  intercondylar area at the distal anterior cruciate ligament  attachment.     Patellofemoral compartment: Moderate to high-grade chondral loss  medial patellar facet.     Medial compartment: Moderate chondral loss centrally within surface of  the medial femoral condyle.     Lateral compartment: No high-grade hyaline cartilage disease.     ANCILLARY FINDINGS  Subcutaneous edema especially anteriorly and medially. Mild  of  popliteus and mild edema and fatty infiltration of soleus muscle.  Focal fatty replacement semimembranosus muscle, likely sequela of  remote trauma.     Pericruciate fat pad edema.                                                                      Impression:  1. Full-thickness radial tear posterior horn/root ligament junction of  medial meniscus with tear extension into the posterior root ligament.  *  Additional horizontal tear of the meniscal body.  2. Medial tibial plateau subchondral insufficiency fracture.  3. Posterolateral capsular edema/fluid, consistent with capsular  tear/injury.   4. Grade 2/3 chondromalacia patellofemoral and medial compartments.  Independent visualization  of the images was performed.      Impression: right knee complex posterior horn/root tear with horizontal meniscal body tear medial meniscus, medial tibial plateau subchondral insufficiency fracture, grade II/III chondromalacia medial compartment, patellofemoral compartment    Plan:  All of the above pertinent physical exam and imaging modalities findings was reviewed with Melo and his sister.    We discussed his MRI findings.  Multiple etiologies in same location certainly could be causing overlapping pain.  We discussed this at this point recommend some conservative care including activity modification with rest.  We discussed his tibial plateau subchondral insufficiency fracture.  I would like to see if this would improve especially based on his point tenderness over the tibial plateau which seems to reproduce a significant portion of his pain.  We discussed a medial  brace.  Discussed crutches.  Work note provided with no work until follow-up in 4 weeks.    Plan to see how he progresses from there.  If improving would recommend further time for recovery.  If plateauing out could consider knee arthroscopy partial medial meniscectomy.  We also discussed meniscal repair.  However with the MRI findings of his tear as well as his grade 3 medial compartment chondromalacia would recommend the partial meniscectomy.    He is going to drop off some short term disability forms for work.  On these forms will anticipate up to 8 weeks for full recovery.        Return to clinic 3, 4, week(s), or sooner as needed for changes.  Re-x-ray on return: No    Darnell Zhu D.O.      Again, thank you for allowing me to participate in the care of your patient.        Sincerely,        Shiv Zhu, DO    Electronically signed

## 2025-01-29 NOTE — TELEPHONE ENCOUNTER
Encounter created in error. Please disregard.     Soledad Mata RN   [facility]

## 2025-02-16 DIAGNOSIS — F33.1 MAJOR DEPRESSIVE DISORDER, RECURRENT EPISODE, MODERATE (H): ICD-10-CM

## 2025-02-17 RX ORDER — ESCITALOPRAM OXALATE 10 MG/1
10 TABLET ORAL DAILY
Qty: 30 TABLET | Refills: 3 | Status: SHIPPED | OUTPATIENT
Start: 2025-02-17

## 2025-02-17 NOTE — PROGRESS NOTES
"Office Visit-Follow up    Chief Complaint: Melo Brewer is a 64 year old male who is being seen for   Chief Complaint   Patient presents with    Right Knee - Pain, Follow Up       History of Present Illness:   Today's visit:  Returns with sister. Doing well. Reports no pain. Has been hesitant to place any weight on the leg. Using crutches. Has used the foot for some balance and transfers a few times recently with no pain.  Waiting for medial  brace to be ready.   No new injury. Reports much better than prior visit.  Looking at possibly return to work on Feb. 28th.     January 29, 2025 visit:  Presents with right knee pain.  He reports December 25 he was getting in his truck when he felt an acute onset of pain to his knee.  Prior to that he had 2 weeks of \"soreness\".  Located medial.  Nonradiating.  No falls associated with it.  He has been taken some ibuprofen.  He said 2 intra-articular corticosteroid injections.  The last 1 provided no relief.  He has been limping.  Does present with an MRI.     Epic reviewed.  Underwent right knee intra-articular corticosteroid injection on December 27, 2024 as well as January 9, 2025.      Patient's past medical, surgical, social and family histories reviewed.             Social History     Occupational History    Not on file   Tobacco Use    Smoking status: Former     Current packs/day: 0.50     Average packs/day: 0.5 packs/day for 15.0 years (7.5 ttl pk-yrs)     Types: Cigarettes    Smokeless tobacco: Never   Substance and Sexual Activity    Alcohol use: Yes     Comment: 1-2 daily    Drug use: No    Sexual activity: Yes     Partners: Female       REVIEW OF SYSTEMS  General: negative for, night sweats, dizziness, fatigue  Resp: No shortness of breath and no cough  CV: negative for chest pain, syncope or near-syncope  GI: negative for nausea, vomiting and diarrhea  : negative for dysuria and hematuria  Musculoskeletal: as above  Neurologic: negative for syncope "   Hematologic: negative for bleeding disorder    Physical Exam:    Constitutional: healthy, alert and no acute distress   Psychiatric: mentation appears normal and affect normal/bright  NEURO: no focal deficits  RESP: Normal with easy respirations and no use of accessory muscles to breathe, no audible wheezing or retractions  CV: No peripheral edema         Regular rate and rhythm by palpation  SKIN: No erythema, rashes, excoriation, or breakdown. No evidence of infection.   JOINT/EXTREMITIES:right knee: no effusion. No bruising. Non-tender along the medial tibial plateau and medial joint line. AROM 0-115. No pain with motion. No lateral sided tenderness. No other focal or bony tenderness.  No instability and tolerated valgus and varus testing without pain.  Extensor intact.  Negative leslie.  GAIT: not tested             Diagnostic Modalities:  None today.  Independent visualization of the images was performed.      Impression: right knee complex posterior horn/root tear with horizontal meniscal body tear medial meniscus, medial tibial plateau subchondral insufficiency fracture, grade II/III chondromalacia medial compartment, patellofemoral compartment     Plan:  All of the above pertinent physical exam and imaging modalities findings was reviewed with Melo.  Significant improvement though has not returned to weight bearing largely due to concern of pain. Discussed started to transition from nwb to weight and wean off crutches as tolerated. No longer tender along the medial tibial plateau.  Recommended therapy especially given his hesitation to returning to weight bearing. Is agreeable.   Discussed his work. He does a physical job. He would like to return end of next week and thinks he will be ready. Given he has not been weight bearing yet, I did express my concerns with going back to work too soon and also discussed restrictions.  He will contact us mid week next week if he does feel ready to proceed and with  or without restrictions and will provide letter at that time.  Recommended to discontinue immobilizer.        Return to clinic 4, week(s) PRN, or sooner as needed for changes.  Re-x-ray on return: No    Dr. Zhu was present in the office.  He personally discussed the care plan and reviewed all appropriate imaging.    SOCORRO Ramos, CNP  Orthopedic Surgery

## 2025-02-19 ENCOUNTER — OFFICE VISIT (OUTPATIENT)
Dept: ORTHOPEDICS | Facility: CLINIC | Age: 65
End: 2025-02-19
Payer: COMMERCIAL

## 2025-02-19 DIAGNOSIS — S83.231A COMPLEX TEAR OF MEDIAL MENISCUS OF RIGHT KNEE AS CURRENT INJURY, INITIAL ENCOUNTER: ICD-10-CM

## 2025-02-19 DIAGNOSIS — M94.261 CHONDROMALACIA OF RIGHT KNEE: Primary | ICD-10-CM

## 2025-02-19 DIAGNOSIS — M84.461A INSUFFICIENCY FRACTURE OF RIGHT TIBIA: ICD-10-CM

## 2025-02-19 PROCEDURE — 99213 OFFICE O/P EST LOW 20 MIN: CPT | Performed by: ORTHOPAEDIC SURGERY

## 2025-02-19 NOTE — Clinical Note
"2/19/2025      Melo Brewer  [address]  [facility] [address]      Dear Colleague,    Thank you for referring your patient, Melo Brewer, to the [facility]. Please see a copy of my visit note below.    Office Visit-Follow up    Chief Complaint: Melo Brewer is a 64 year old male who is being seen for   Chief Complaint   Patient presents with    Right Knee - Pain, Follow Up       History of Present Illness:   Today's visit:  ***    January 29, 2025 visit:  Presents with right knee pain.  He reports December 25 he was getting in his truck when he felt an acute onset of pain to his knee.  Prior to that he had 2 weeks of \"soreness\".  Located medial.  Nonradiating.  No falls associated with it.  He has been taken some ibuprofen.  He said 2 intra-articular corticosteroid injections.  The last 1 provided no relief.  He has been limping.  Does present with an MRI.     Epic reviewed.  Underwent right knee intra-articular corticosteroid injection on December 27, 2024 as well as January 9, 2025.      Patient's past medical, surgical, social and family histories reviewed.             Social History     Occupational History    Not on file   Tobacco Use    Smoking status: Former     Current packs/day: 0.50     Average packs/day: 0.5 packs/day for 15.0 years (7.5 ttl pk-yrs)     Types: Cigarettes    Smokeless tobacco: Never   Substance and Sexual Activity    Alcohol use: Yes     Comment: 1-2 daily    Drug use: No    Sexual activity: Yes     Partners: Female       REVIEW OF SYSTEMS  General: negative for, night sweats, dizziness, fatigue  Resp: No shortness of breath and no cough  CV: negative for chest pain, syncope or near-syncope  GI: negative for nausea, vomiting and diarrhea  : negative for dysuria and hematuria  Musculoskeletal: as above  Neurologic: negative for syncope   Hematologic: negative for bleeding disorder    Physical Exam:  Vitals: There were no vitals taken for this visit.  BMI= " There is no height or weight on file to calculate BMI.  Constitutional: healthy, alert and no acute distress   Psychiatric: mentation appears normal and affect normal/bright  NEURO: no focal deficits  RESP: {jclrespexam:972929}  CV: {jcledemalocation:193764}         {jclcardioexam:309453}  SKIN: {jclskinexam:796685}  JOINT/EXTREMITIES:{SIDE:5002} {ORTHO:719293}  GAIT: {GAIT:780063}            Diagnostic Modalities:  None today.  Independent visualization of the images was performed.      Impression: right knee complex posterior horn/root tear with horizontal meniscal body tear medial meniscus, medial tibial plateau subchondral insufficiency fracture, grade II/III chondromalacia medial compartment, patellofemoral compartment     Plan:  All of the above pertinent physical exam and imaging modalities findings was reviewed with Melo***.    {SCOOBY PLAN OPTIONS VERSION 2:462747}          Return to clinic {SCOOBY RTC WHEN:323454}, or sooner as needed for changes.  Re-x-ray on return: {SCOOBY YES NO NOT APPLICABLE:438097}    Darnell Zhu D.O.            Again, thank you for allowing me to participate in the care of your patient.        Sincerely,        Shiv Zhu, DO    Electronically signed Male

## 2025-02-19 NOTE — LETTER
February 19, 2025      Melo Brewer  [address] [facility] [address]        To Whom It May Concern:    Melo DE SANTIAGO Daniella  was seen on 2/19/25.  Recommending physical therapy and a potential return to work on Feb. 28.  Will provide updated letter week of February 24th.        Sincerely,            SOCORRO Ramos, CNP  Orthopedic Surgery

## 2025-02-24 ENCOUNTER — TELEPHONE (OUTPATIENT)
Dept: ADMISSION | Facility: CLINIC | Age: 65
End: 2025-02-24
Payer: COMMERCIAL

## 2025-02-24 NOTE — TELEPHONE ENCOUNTER
Reason for Call:  Other  Cancel Knee Brace Order    Detailed comments: Christelle is calling because they still have not received the knee brace and it's been a month.  So they do not want the knee brace anymore and end up paying for something he won't even need anymore.  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  He was seen at the [facility].    Consent to communicate on file to speak with Christelle.    Phone Number Patient can be reached at: Other phone number:  [facility]elle [phone] (Home)     Best Time: anytime    Can we leave a detailed message on this number? YES    Call taken on 2/24/2025 at 4:30 PM by Mary Sanford

## 2025-02-25 ENCOUNTER — TELEPHONE (OUTPATIENT)
Dept: ORTHOPEDICS | Facility: OTHER | Age: 65
End: 2025-02-25
Payer: COMMERCIAL

## 2025-02-25 NOTE — LETTER
February 25, 2025      Melo Brewer  [address] [facility] [address]        To Whom It May Concern:    Melo Brewer was seen in our clinic. He may return to work without restrictions on 3/3/25.      Sincerely,    '    SOCORRO Ramos, CNP  Orthopedic Surgery

## 2025-02-25 NOTE — TELEPHONE ENCOUNTER
Attempted to call Christelle, consent to communicate is on file, no answer. Left voicemail and requested she call the [facility] team at [phone] to discuss cancelling the order for the brace. Advised her to give us a call back at [phone] if there are any additional questions or concerns.       Soledad Mata RN   [facility]

## 2025-02-25 NOTE — TELEPHONE ENCOUNTER
Form or Letter   Type or form/letter needing completion: Pt is requesting a note to return to work on Monday March 3rd  Provider:   Date form needed: 2/25  Once completed: Patient will  at .    Could we send this information to you in Language Systems or would you prefer to receive a phone call?:   Patient would prefer a phone call   Okay to leave a detailed message?: Yes at Cell number on file:    Telephone Information:   Mobile [phone]

## 2025-02-26 NOTE — TELEPHONE ENCOUNTER
Letter printed. Brought to lower level speciality.  Please inform patient.      SOCORRO Ramos, CNP  Orthopedic Surgery

## 2025-02-26 NOTE — TELEPHONE ENCOUNTER
Attempted to call Lala, no answer. Left detailed voicemail outlining provider recommendations. Advised her to call back at [phone] if there are any further questions.     Soledad Mata RN   [facility]

## 2025-03-04 NOTE — TELEPHONE ENCOUNTER
"Requested Prescriptions   Pending Prescriptions Disp Refills     clopidogrel (PLAVIX) 75 MG tablet  Last Written Prescription Date:  2/27/19  Last Fill Quantity: 30,  # refills: 3   Last office visit: 5/20/2019 with prescribing provider:  Kathleen   Future Office Visit:   Next 5 appointments (look out 90 days)    Jul 01, 2019 11:45 AM CDT  (Arrive by 11:30 AM)  Return Visit with Sae Ramos MD  [facility] ([facility]) [address]  [place] [address]  [phone]          30 tablet 3     Sig: Take 1 tablet (75 mg) by mouth daily       Plavix Passed - 6/11/2019  9:05 AM        Passed - No active PPI on record unless is Protonix        Passed - Normal HGB on file in past 12 months     Recent Labs   Lab Test 02/27/19  0726   HGB 13.3               Passed - Normal Platelets on file in past 12 months     Recent Labs   Lab Test 02/27/19  0726                  Passed - Recent (12 mo) or future (30 days) visit within the authorizing provider's specialty     Patient had office visit in the last 12 months or has a visit in the next 30 days with authorizing provider or within the authorizing provider's specialty.  See \"Patient Info\" tab in inbasket, or \"Choose Columns\" in Meds & Orders section of the refill encounter.              Passed - Medication is active on med list        Passed - Patient is age 18 or older          "
Prescription approved per [facility] Refill Protocol.    SITA MohanN, RN  [facility]    
Vaccine status unknown

## 2025-04-21 DIAGNOSIS — E78.5 HYPERLIPIDEMIA LDL GOAL <100: ICD-10-CM

## 2025-04-21 RX ORDER — ATORVASTATIN CALCIUM 80 MG/1
80 TABLET, FILM COATED ORAL
Qty: 90 TABLET | Refills: 0 | Status: SHIPPED | OUTPATIENT
Start: 2025-04-21

## 2025-05-05 NOTE — PROGRESS NOTES
"Office Visit-Follow up    Chief Complaint: eMlo Brewer is a 64 year old male who is being seen for   Chief Complaint   Patient presents with    Right Knee - Follow Up       History of Present Illness:   Today's visit:  Returns with his knee pain.  Medial sided started having some occasional sharp but more of an achy discomfort.  No recent traumas.  He was never able to get the brace.    February 19, 2025 office visit:  Returns with sister. Doing well. Reports no pain. Has been hesitant to place any weight on the leg. Using crutches. Has used the foot for some balance and transfers a few times recently with no pain.  Waiting for medial  brace to be ready.   No new injury. Reports much better than prior visit.  Looking at possibly return to work on Feb. 28th.      January 29, 2025 visit:  Presents with right knee pain.  He reports December 25 he was getting in his truck when he felt an acute onset of pain to his knee.  Prior to that he had 2 weeks of \"soreness\".  Located medial.  Nonradiating.  No falls associated with it.  He has been taken some ibuprofen.  He said 2 intra-articular corticosteroid injections.  The last 1 provided no relief.  He has been limping.  Does present with an MRI.     Epic reviewed.  Underwent right knee intra-articular corticosteroid injection on December 27, 2024 as well as January 9, 2025.            Social History     Occupational History    Not on file   Tobacco Use    Smoking status: Former     Current packs/day: 0.50     Average packs/day: 0.5 packs/day for 15.0 years (7.5 ttl pk-yrs)     Types: Cigarettes    Smokeless tobacco: Never   Substance and Sexual Activity    Alcohol use: Yes     Comment: 1-2 daily    Drug use: No    Sexual activity: Yes     Partners: Female       REVIEW OF SYSTEMS  General: negative for, night sweats, dizziness, fatigue  Resp: No shortness of breath and no cough  CV: negative for chest pain, syncope or near-syncope  GI: negative for nausea, " vomiting and diarrhea  : negative for dysuria and hematuria  Musculoskeletal: as above  Neurologic: negative for syncope   Hematologic: negative for bleeding disorder    Physical Exam:  Vitals: There were no vitals taken for this visit.  BMI= There is no height or weight on file to calculate BMI.  Constitutional: healthy, alert and no acute distress   Psychiatric: mentation appears normal and affect normal/bright  NEURO: no focal deficits  RESP: Normal with easy respirations and no use of accessory muscles to breathe, no audible wheezing or retractions  CV: RLE: no edema           SKIN: No erythema, rashes, excoriation, or breakdown. No evidence of infection.   JOINT/EXTREMITIES:right knee: No deformity.  No effusion.  No soft tissue or bursal swelling.  No joint line pain.  Point tenderness over the medial tibial/pes area that reproduces pain.  No instability to the knee.  GAIT: not tested             Diagnostic Modalities:  None today.  Independent visualization of the images was performed.      Impression: right knee complex posterior horn/root tear with horizontal meniscal body tear medial meniscus, medial tibial plateau subchondral insufficiency fracture, grade II/III chondromalacia medial compartment, patellofemoral compartment     Symptomatic pes bursitis    Plan:  All of the above pertinent physical exam and imaging modalities findings was reviewed with Melo.                                          INJECTION PROCEDURE:  The patient was counseled about an  injection, including discussion of risks (including infection), contents of the injection, rationale for performing the injection, and expected benefits of the injection. The skin was prepped with alcohol and betadine and then utilizing sterile technique an injection of the right knee pes was performed. The injection consisted 1ml of Kenalog (40mg per 1 ml) mixed with 3ml of 0.5% Marcaine. The patient tolerated the injection well, and there were no  complications. The injection site was covered with a Band-Aid. The injection was performed by SOCORRO Mann, CNP, DNP    Majority is tenderness more along the pes bursa area.  Did have a subchondral insufficiency fracture along this region but given the timeframe should be healed.  Describes it as recent onset of some soreness mainly.  Will proceed with the injection as noted      Return to clinic 6, week(s), PRN, or sooner as needed for changes.  Re-x-ray on return: No    Darnell Zhu D.O.

## 2025-05-07 ENCOUNTER — OFFICE VISIT (OUTPATIENT)
Dept: ORTHOPEDICS | Facility: CLINIC | Age: 65
End: 2025-05-07
Payer: COMMERCIAL

## 2025-05-07 DIAGNOSIS — M94.261 CHONDROMALACIA OF RIGHT KNEE: Primary | ICD-10-CM

## 2025-05-07 DIAGNOSIS — M84.461A INSUFFICIENCY FRACTURE OF RIGHT TIBIA: ICD-10-CM

## 2025-05-07 DIAGNOSIS — M70.51 PES ANSERINUS BURSITIS OF RIGHT KNEE: ICD-10-CM

## 2025-05-07 DIAGNOSIS — S83.231A COMPLEX TEAR OF MEDIAL MENISCUS OF RIGHT KNEE AS CURRENT INJURY, INITIAL ENCOUNTER: ICD-10-CM

## 2025-05-07 PROCEDURE — 20610 DRAIN/INJ JOINT/BURSA W/O US: CPT | Mod: RT | Performed by: ORTHOPAEDIC SURGERY

## 2025-05-07 RX ORDER — BUPIVACAINE HYDROCHLORIDE 5 MG/ML
3 INJECTION, SOLUTION EPIDURAL; INTRACAUDAL; PERINEURAL
Status: COMPLETED | OUTPATIENT
Start: 2025-05-07 | End: 2025-05-07

## 2025-05-07 RX ORDER — TRIAMCINOLONE ACETONIDE 40 MG/ML
40 INJECTION, SUSPENSION INTRA-ARTICULAR; INTRAMUSCULAR
Status: COMPLETED | OUTPATIENT
Start: 2025-05-07 | End: 2025-05-07

## 2025-05-07 RX ADMIN — BUPIVACAINE HYDROCHLORIDE 3 ML: 5 INJECTION, SOLUTION EPIDURAL; INTRACAUDAL; PERINEURAL at 07:41

## 2025-05-07 RX ADMIN — TRIAMCINOLONE ACETONIDE 40 MG: 40 INJECTION, SUSPENSION INTRA-ARTICULAR; INTRAMUSCULAR at 07:41

## 2025-05-07 NOTE — PROGRESS NOTES
Large Joint Injection/Arthocentesis: R anserine bursa    Date/Time: 5/7/2025 7:41 AM    Performed by: Gabriel Frost APRN CNP  Authorized by: Shiv Zhu DO    Indications:  Pain  Needle Size:  25 G  Guidance: landmark guided    Approach:  Anterolateral  Location:  Knee      Medications:  40 mg triamcinolone 40 MG/ML; 3 mL BUPivacaine (PF) 0.5 %  Outcome:  Tolerated well, no immediate complications  Procedure discussed: discussed risks, benefits, and alternatives    Consent Given by:  Patient  Timeout: timeout called immediately prior to procedure    Prep: patient was prepped and draped in usual sterile fashion

## 2025-05-07 NOTE — LETTER
"5/7/2025      Melo Brewer  [address]  [place] [address]      Dear Colleague,    Thank you for referring your patient, Melo Brewer, to the [facility]. Please see a copy of my visit note below.    Office Visit-Follow up    Chief Complaint: Melo Brewer is a 64 year old male who is being seen for   Chief Complaint   Patient presents with     Right Knee - Follow Up       History of Present Illness:   Today's visit:  Returns with his knee pain.  Medial sided started having some occasional sharp but more of an achy discomfort.  No recent traumas.  He was never able to get the brace.    February 19, 2025 office visit:  Returns with sister. Doing well. Reports no pain. Has been hesitant to place any weight on the leg. Using crutches. Has used the foot for some balance and transfers a few times recently with no pain.  Waiting for medial  brace to be ready.   No new injury. Reports much better than prior visit.  Looking at possibly return to work on Feb. 28th.      January 29, 2025 visit:  Presents with right knee pain.  He reports December 25 he was getting in his truck when he felt an acute onset of pain to his knee.  Prior to that he had 2 weeks of \"soreness\".  Located medial.  Nonradiating.  No falls associated with it.  He has been taken some ibuprofen.  He said 2 intra-articular corticosteroid injections.  The last 1 provided no relief.  He has been limping.  Does present with an MRI.     Epic reviewed.  Underwent right knee intra-articular corticosteroid injection on December 27, 2024 as well as January 9, 2025.            Social History     Occupational History     Not on file   Tobacco Use     Smoking status: Former     Current packs/day: 0.50     Average packs/day: 0.5 packs/day for 15.0 years (7.5 ttl pk-yrs)     Types: Cigarettes     Smokeless tobacco: Never   Substance and Sexual Activity     Alcohol use: Yes     Comment: 1-2 daily     Drug use: No     Sexual " activity: Yes     Partners: Female       REVIEW OF SYSTEMS  General: negative for, night sweats, dizziness, fatigue  Resp: No shortness of breath and no cough  CV: negative for chest pain, syncope or near-syncope  GI: negative for nausea, vomiting and diarrhea  : negative for dysuria and hematuria  Musculoskeletal: as above  Neurologic: negative for syncope   Hematologic: negative for bleeding disorder    Physical Exam:  Vitals: There were no vitals taken for this visit.  BMI= There is no height or weight on file to calculate BMI.  Constitutional: healthy, alert and no acute distress   Psychiatric: mentation appears normal and affect normal/bright  NEURO: no focal deficits  RESP: Normal with easy respirations and no use of accessory muscles to breathe, no audible wheezing or retractions  CV: RLE: no edema           SKIN: No erythema, rashes, excoriation, or breakdown. No evidence of infection.   JOINT/EXTREMITIES:right knee: No deformity.  No effusion.  No soft tissue or bursal swelling.  No joint line pain.  Point tenderness over the medial tibial/pes area that reproduces pain.  No instability to the knee.  GAIT: not tested             Diagnostic Modalities:  None today.  Independent visualization of the images was performed.      Impression: right knee complex posterior horn/root tear with horizontal meniscal body tear medial meniscus, medial tibial plateau subchondral insufficiency fracture, grade II/III chondromalacia medial compartment, patellofemoral compartment     Symptomatic pes bursitis    Plan:  All of the above pertinent physical exam and imaging modalities findings was reviewed with Melo.                                          INJECTION PROCEDURE:  The patient was counseled about an  injection, including discussion of risks (including infection), contents of the injection, rationale for performing the injection, and expected benefits of the injection. The skin was prepped with alcohol and betadine  and then utilizing sterile technique an injection of the right knee pes was performed. The injection consisted 1ml of Kenalog (40mg per 1 ml) mixed with 3ml of 0.5% Marcaine. The patient tolerated the injection well, and there were no complications. The injection site was covered with a Band-Aid. The injection was performed by SOCORRO Mann, CNP, DNP    Majority is tenderness more along the pes bursa area.  Did have a subchondral insufficiency fracture along this region but given the timeframe should be healed.  Describes it as recent onset of some soreness mainly.  Will proceed with the injection as noted      Return to clinic 6, week(s), PRN, or sooner as needed for changes.  Re-x-ray on return: No    Darnell Zhu D.O.          Large Joint Injection/Arthocentesis: R anserine bursa    Date/Time: 5/7/2025 7:41 AM    Performed by: Gabriel Frost APRN CNP  Authorized by: Shiv Zhu DO    Indications:  Pain  Needle Size:  25 G  Guidance: landmark guided    Approach:  Anterolateral  Location:  Knee      Medications:  40 mg triamcinolone 40 MG/ML; 3 mL BUPivacaine (PF) 0.5 %  Outcome:  Tolerated well, no immediate complications  Procedure discussed: discussed risks, benefits, and alternatives    Consent Given by:  Patient  Timeout: timeout called immediately prior to procedure    Prep: patient was prepped and draped in usual sterile fashion          Again, thank you for allowing me to participate in the care of your patient.        Sincerely,        Shiv Zhu DO    Electronically signed

## 2025-05-21 DIAGNOSIS — I10 BENIGN ESSENTIAL HYPERTENSION: ICD-10-CM

## 2025-05-21 DIAGNOSIS — I65.21 STENOSIS OF RIGHT CAROTID ARTERY: ICD-10-CM

## 2025-05-21 RX ORDER — ASPIRIN 81 MG/1
81 TABLET, DELAYED RELEASE ORAL DAILY
Qty: 90 TABLET | Refills: 3 | Status: SHIPPED | OUTPATIENT
Start: 2025-05-21

## 2025-05-21 RX ORDER — METOPROLOL SUCCINATE 25 MG/1
25 TABLET, EXTENDED RELEASE ORAL
Qty: 90 TABLET | Refills: 1 | Status: SHIPPED | OUTPATIENT
Start: 2025-05-21

## 2025-06-03 ENCOUNTER — TELEPHONE (OUTPATIENT)
Dept: OTHER | Facility: CLINIC | Age: 65
End: 2025-06-03
Payer: COMMERCIAL

## 2025-06-03 DIAGNOSIS — I65.21 SYMPTOMATIC CAROTID ARTERY STENOSIS, RIGHT: Primary | ICD-10-CM

## 2025-06-03 NOTE — TELEPHONE ENCOUNTER
[facility]    Who is the name of the provider?:  JOSE ELIAS LYNCH   What is the location you see this provider at/preferred location?: [place]  Person calling / Facility: Melo Brewer  Phone number:  [phone] (home)   Nurse call back needed:  Route to scheduling      Reason for call:  Patient's wife calling to reschedule appointment that was cancelled 01/10/205 - please advise if any additional imaging needs to be scheduled / extend orders for pending US carotid, route back to scheduling     6/3/2025, 11:09 AM

## 2025-06-03 NOTE — TELEPHONE ENCOUNTER
Hx: right carotid endarterectomy;  follow up to 07/24/2023 office visit with Dr. Ramos      Routing to scheduling to coordinate the following:  US Carotid Bilateral  RETURN VASCULAR PATIENT consult with Dr. Ramos  Please schedule this at next available      Appt note:  Hx: right carotid endarterectomy;  follow up to 07/24/2023 office visit with Dr. Ramos

## 2025-06-14 ENCOUNTER — HEALTH MAINTENANCE LETTER (OUTPATIENT)
Age: 65
End: 2025-06-14

## 2025-07-07 ENCOUNTER — HOSPITAL ENCOUNTER (OUTPATIENT)
Dept: ULTRASOUND IMAGING | Facility: CLINIC | Age: 65
Discharge: HOME | End: 2025-07-07
Attending: SURGERY
Payer: COMMERCIAL

## 2025-07-07 ENCOUNTER — OFFICE VISIT (OUTPATIENT)
Dept: OTHER | Facility: CLINIC | Age: 65
End: 2025-07-07
Attending: SURGERY
Payer: COMMERCIAL

## 2025-07-07 VITALS — SYSTOLIC BLOOD PRESSURE: 152 MMHG | HEART RATE: 62 BPM | DIASTOLIC BLOOD PRESSURE: 91 MMHG

## 2025-07-07 DIAGNOSIS — I65.21 SYMPTOMATIC STENOSIS OF RIGHT CAROTID ARTERY: Primary | ICD-10-CM

## 2025-07-07 DIAGNOSIS — I65.21 SYMPTOMATIC CAROTID ARTERY STENOSIS, RIGHT: ICD-10-CM

## 2025-07-07 PROCEDURE — 3080F DIAST BP >= 90 MM HG: CPT | Performed by: SURGERY

## 2025-07-07 PROCEDURE — 99213 OFFICE O/P EST LOW 20 MIN: CPT | Performed by: SURGERY

## 2025-07-07 PROCEDURE — 3077F SYST BP >= 140 MM HG: CPT | Performed by: SURGERY

## 2025-07-07 PROCEDURE — 93880 EXTRACRANIAL BILAT STUDY: CPT

## 2025-07-08 NOTE — PROGRESS NOTES
It was a pleasure to see . Melo Brewer in the vascular surgery clinic today.  He is now 65 years old.  In February 2019 we did a right carotid endarterectomy 506 with Kiel.    He has not had any problems since that time.  He continues to live a fall and busy life.    Sonography shows widely patent right carotid endarterectomy site with mixed plaque and less than 50% stenosis of the left side.    I have plenty fully reassured him.    We will see him back in 1 year with bilateral duplex sonography for ongoing surveillance.  All question answered.

## 2025-07-20 DIAGNOSIS — E78.5 HYPERLIPIDEMIA LDL GOAL <100: ICD-10-CM

## 2025-07-21 RX ORDER — ATORVASTATIN CALCIUM 80 MG/1
80 TABLET, FILM COATED ORAL DAILY
Qty: 90 TABLET | Refills: 0 | Status: SHIPPED | OUTPATIENT
Start: 2025-07-21

## (undated) DEVICE — DECANTER VIAL 2006S

## (undated) DEVICE — ESU GROUND PAD UNIVERSAL W/O CORD

## (undated) DEVICE — IONM EEG MONITORING UP TO 7 HRS

## (undated) DEVICE — GLOVE PROTEXIS W/NEU-THERA 8.0  2D73TE80

## (undated) DEVICE — SU PDS II 3-0 SH 27" Z316H

## (undated) DEVICE — LINEN TOWEL PACK X5 5464

## (undated) DEVICE — CLIP APPLIER 09 3/8" SM LIGACLIP MCS20

## (undated) DEVICE — LINEN TOWEL PACK X6 WHITE 5487

## (undated) DEVICE — BLADE KNIFE BEAVER MINI BEAVER6400

## (undated) DEVICE — SYR 03ML LL W/O NDL 309657

## (undated) DEVICE — SU MONOCRYL 4-0 PS-2 18" UND Y496G

## (undated) DEVICE — DECANTER BAG 2002S

## (undated) DEVICE — SUCTION CANISTER MEDIVAC LINER 3000ML W/LID 65651-530

## (undated) DEVICE — NDL 18GA 1.5" 305196

## (undated) DEVICE — SOL WATER IRRIG 1000ML BOTTLE 2F7114

## (undated) DEVICE — COVER ULTRASOUND PROBE 6X48" PC1290

## (undated) DEVICE — DRAIN JACKSON PRATT 15FR ROUND SU130-1323

## (undated) DEVICE — PACK VASCULAR SCV15VAFSB

## (undated) DEVICE — EEG MONITORING SUPPLIES

## (undated) DEVICE — NDL 19GA 1.5"

## (undated) DEVICE — SU PROLENE 6-0 C-1DA 30" 8706H

## (undated) DEVICE — SOL NACL 0.9% IRRIG 1000ML BOTTLE 07138-09

## (undated) DEVICE — SU PROLENE 5-0 RB-2DA 30" 8710H

## (undated) DEVICE — SURGICEL HEMOSTAT 3X4" NUKNIT 1943

## (undated) DEVICE — SU ETHILON 3-0 PS-1 18" 1663G

## (undated) DEVICE — SU SILK 2-0 TIE 18' A185H

## (undated) DEVICE — DRAIN JACKSON PRATT RESERVOIR 100ML SU130-1305

## (undated) DEVICE — SOL NACL 0.9% INJ 250ML BAG 2B1322Q

## (undated) RX ORDER — HEPARIN SODIUM 1000 [USP'U]/ML
INJECTION, SOLUTION INTRAVENOUS; SUBCUTANEOUS
Status: DISPENSED
Start: 2019-02-26

## (undated) RX ORDER — GLYCOPYRROLATE 0.2 MG/ML
INJECTION, SOLUTION INTRAMUSCULAR; INTRAVENOUS
Status: DISPENSED
Start: 2019-02-26

## (undated) RX ORDER — LIDOCAINE HYDROCHLORIDE 10 MG/ML
INJECTION, SOLUTION INFILTRATION; PERINEURAL
Status: DISPENSED
Start: 2019-02-26

## (undated) RX ORDER — DEXAMETHASONE SODIUM PHOSPHATE 4 MG/ML
INJECTION, SOLUTION INTRA-ARTICULAR; INTRALESIONAL; INTRAMUSCULAR; INTRAVENOUS; SOFT TISSUE
Status: DISPENSED
Start: 2019-02-26

## (undated) RX ORDER — FENTANYL CITRATE 50 UG/ML
INJECTION, SOLUTION INTRAMUSCULAR; INTRAVENOUS
Status: DISPENSED
Start: 2019-02-26

## (undated) RX ORDER — CEFAZOLIN SODIUM 2 G/100ML
INJECTION, SOLUTION INTRAVENOUS
Status: DISPENSED
Start: 2019-02-26

## (undated) RX ORDER — ASPIRIN 600 MG/1
SUPPOSITORY RECTAL
Status: DISPENSED
Start: 2019-02-26

## (undated) RX ORDER — FENTANYL CITRATE 50 UG/ML
INJECTION, SOLUTION INTRAMUSCULAR; INTRAVENOUS
Status: DISPENSED
Start: 2017-05-19

## (undated) RX ORDER — ONDANSETRON 2 MG/ML
INJECTION INTRAMUSCULAR; INTRAVENOUS
Status: DISPENSED
Start: 2019-02-26

## (undated) RX ORDER — NEOSTIGMINE METHYLSULFATE 1 MG/ML
VIAL (ML) INJECTION
Status: DISPENSED
Start: 2019-02-26

## (undated) RX ORDER — HYDROMORPHONE HYDROCHLORIDE 1 MG/ML
INJECTION, SOLUTION INTRAMUSCULAR; INTRAVENOUS; SUBCUTANEOUS
Status: DISPENSED
Start: 2019-02-26